# Patient Record
Sex: MALE | Race: WHITE | NOT HISPANIC OR LATINO | Employment: FULL TIME | URBAN - METROPOLITAN AREA
[De-identification: names, ages, dates, MRNs, and addresses within clinical notes are randomized per-mention and may not be internally consistent; named-entity substitution may affect disease eponyms.]

---

## 2017-03-17 ENCOUNTER — LAB VISIT (OUTPATIENT)
Dept: LAB | Facility: OTHER | Age: 47
End: 2017-03-17
Attending: FAMILY MEDICINE
Payer: COMMERCIAL

## 2017-03-17 ENCOUNTER — OFFICE VISIT (OUTPATIENT)
Dept: INTERNAL MEDICINE | Facility: CLINIC | Age: 47
End: 2017-03-17
Attending: FAMILY MEDICINE
Payer: COMMERCIAL

## 2017-03-17 VITALS
HEART RATE: 91 BPM | HEIGHT: 70 IN | BODY MASS INDEX: 27.75 KG/M2 | WEIGHT: 193.81 LBS | DIASTOLIC BLOOD PRESSURE: 80 MMHG | SYSTOLIC BLOOD PRESSURE: 110 MMHG

## 2017-03-17 DIAGNOSIS — Z00.00 PREVENTATIVE HEALTH CARE: Primary | ICD-10-CM

## 2017-03-17 DIAGNOSIS — E78.01 HYPERLIPIDEMIA TYPE II: ICD-10-CM

## 2017-03-17 DIAGNOSIS — E11.9 TYPE 2 DIABETES MELLITUS WITHOUT COMPLICATION, WITHOUT LONG-TERM CURRENT USE OF INSULIN: ICD-10-CM

## 2017-03-17 DIAGNOSIS — Z00.00 PREVENTATIVE HEALTH CARE: ICD-10-CM

## 2017-03-17 LAB
ALBUMIN SERPL BCP-MCNC: 4.3 G/DL
ALP SERPL-CCNC: 70 U/L
ALT SERPL W/O P-5'-P-CCNC: 28 U/L
ANION GAP SERPL CALC-SCNC: 8 MMOL/L
AST SERPL-CCNC: 20 U/L
BASOPHILS # BLD AUTO: 0.03 K/UL
BASOPHILS NFR BLD: 0.5 %
BILIRUB SERPL-MCNC: 0.2 MG/DL
BUN SERPL-MCNC: 12 MG/DL
CALCIUM SERPL-MCNC: 9.7 MG/DL
CHLORIDE SERPL-SCNC: 107 MMOL/L
CHOLEST/HDLC SERPL: 2.9 {RATIO}
CO2 SERPL-SCNC: 27 MMOL/L
CREAT SERPL-MCNC: 0.9 MG/DL
DIFFERENTIAL METHOD: ABNORMAL
EOSINOPHIL # BLD AUTO: 0.2 K/UL
EOSINOPHIL NFR BLD: 3.1 %
ERYTHROCYTE [DISTWIDTH] IN BLOOD BY AUTOMATED COUNT: 14.1 %
EST. GFR  (AFRICAN AMERICAN): >60 ML/MIN/1.73 M^2
EST. GFR  (NON AFRICAN AMERICAN): >60 ML/MIN/1.73 M^2
GLUCOSE SERPL-MCNC: 95 MG/DL
HCT VFR BLD AUTO: 47.4 %
HDL/CHOLESTEROL RATIO: 34 %
HDLC SERPL-MCNC: 159 MG/DL
HDLC SERPL-MCNC: 54 MG/DL
HGB BLD-MCNC: 15.7 G/DL
LDLC SERPL CALC-MCNC: 91 MG/DL
LYMPHOCYTES # BLD AUTO: 1.9 K/UL
LYMPHOCYTES NFR BLD: 31.8 %
MCH RBC QN AUTO: 31.8 PG
MCHC RBC AUTO-ENTMCNC: 33.1 %
MCV RBC AUTO: 96 FL
MONOCYTES # BLD AUTO: 0.5 K/UL
MONOCYTES NFR BLD: 8.3 %
NEUTROPHILS # BLD AUTO: 3.3 K/UL
NEUTROPHILS NFR BLD: 56 %
NONHDLC SERPL-MCNC: 105 MG/DL
PLATELET # BLD AUTO: 323 K/UL
PMV BLD AUTO: 9.3 FL
POTASSIUM SERPL-SCNC: 4.8 MMOL/L
PROT SERPL-MCNC: 7.2 G/DL
RBC # BLD AUTO: 4.93 M/UL
SODIUM SERPL-SCNC: 142 MMOL/L
TRIGL SERPL-MCNC: 70 MG/DL
TSH SERPL DL<=0.005 MIU/L-ACNC: 0.69 UIU/ML
WBC # BLD AUTO: 5.81 K/UL

## 2017-03-17 PROCEDURE — 85025 COMPLETE CBC W/AUTO DIFF WBC: CPT

## 2017-03-17 PROCEDURE — 84443 ASSAY THYROID STIM HORMONE: CPT

## 2017-03-17 PROCEDURE — 36415 COLL VENOUS BLD VENIPUNCTURE: CPT

## 2017-03-17 PROCEDURE — 99999 PR PBB SHADOW E&M-EST. PATIENT-LVL III: CPT | Mod: PBBFAC,,, | Performed by: FAMILY MEDICINE

## 2017-03-17 PROCEDURE — 80061 LIPID PANEL: CPT

## 2017-03-17 PROCEDURE — 83036 HEMOGLOBIN GLYCOSYLATED A1C: CPT

## 2017-03-17 PROCEDURE — 99396 PREV VISIT EST AGE 40-64: CPT | Mod: S$GLB,,, | Performed by: FAMILY MEDICINE

## 2017-03-17 PROCEDURE — 80053 COMPREHEN METABOLIC PANEL: CPT

## 2017-03-17 RX ORDER — ROSUVASTATIN CALCIUM 5 MG/1
TABLET, COATED ORAL
COMMUNITY
Start: 2016-12-31 | End: 2017-03-17 | Stop reason: SDUPTHER

## 2017-03-17 RX ORDER — BLOOD SUGAR DIAGNOSTIC
STRIP MISCELLANEOUS
COMMUNITY
Start: 2017-02-07 | End: 2017-03-17 | Stop reason: SDUPTHER

## 2017-03-17 RX ORDER — BLOOD SUGAR DIAGNOSTIC
STRIP MISCELLANEOUS
Qty: 300 EACH | Refills: 11 | Status: SHIPPED | OUTPATIENT
Start: 2017-03-17 | End: 2018-02-26 | Stop reason: SDUPTHER

## 2017-03-17 RX ORDER — ROSUVASTATIN CALCIUM 5 MG/1
5 TABLET, COATED ORAL DAILY
Qty: 90 TABLET | Refills: 3 | Status: SHIPPED | OUTPATIENT
Start: 2017-03-17 | End: 2017-10-12 | Stop reason: SDUPTHER

## 2017-03-17 RX ORDER — METFORMIN HYDROCHLORIDE 500 MG/1
500 TABLET ORAL 2 TIMES DAILY WITH MEALS
Qty: 90 TABLET | Refills: 3 | Status: SHIPPED | OUTPATIENT
Start: 2017-03-17 | End: 2017-04-18 | Stop reason: SDUPTHER

## 2017-03-17 RX ORDER — METFORMIN HYDROCHLORIDE 500 MG/1
TABLET ORAL
COMMUNITY
Start: 2017-01-25 | End: 2017-03-17 | Stop reason: SDUPTHER

## 2017-03-18 LAB
ESTIMATED AVG GLUCOSE: 126 MG/DL
HBA1C MFR BLD HPLC: 6 %

## 2017-03-20 ENCOUNTER — TELEPHONE (OUTPATIENT)
Dept: INTERNAL MEDICINE | Facility: CLINIC | Age: 47
End: 2017-03-20

## 2017-03-20 PROBLEM — E78.01 HYPERLIPIDEMIA TYPE II: Status: ACTIVE | Noted: 2017-03-20

## 2017-03-20 PROBLEM — E11.9 TYPE 2 DIABETES MELLITUS WITHOUT COMPLICATION, WITHOUT LONG-TERM CURRENT USE OF INSULIN: Status: ACTIVE | Noted: 2017-03-20

## 2017-03-20 NOTE — TELEPHONE ENCOUNTER
----- Message from Kam Phipps MD sent at 3/20/2017  4:08 PM CDT -----  Cholesterol is very good.  A1c is 6.  No changes in medications.  Followup as scheduled.

## 2017-03-20 NOTE — PROGRESS NOTES
"CHIEF COMPLAINT: Annual exam     HISTORY OF PRESENT ILLNESS: The patient is a 46 year-old white male. He has been diagnosed with diabetes and hyperglycemia since the last time I saw him.  He is currently on metformin and Crestor.     The patient has a history of diabetes.  Recent blood sugars have been less than 120.  There have been no episodes of hypoglycemia.    The patient has a history of stable hyperlipidemia on current medications.  The patient denies chest pain or shortness of breath today.  The patient denies muscle aches or myalgias suggestive of myositis.    REVIEW OF SYSTEMS:   GENERAL: No fever, chills, weight loss.   SKIN: No rashes, itching or changes in color or texture of skin.   HEAD: No headaches or recent head trauma.   EYES: Visual acuity fine. No photophobia, ocular pain or diplopia.   EARS: Denies ear pain, discharge or vertigo.   NOSE: No loss of smell, no epistaxis or postnasal drip.   MOUTH & THROAT: No hoarseness or change in voice. No excessive gum bleeding.   NODES: Denies swollen glands.   CHEST: Denies JASMINE, cyanosis, wheezing, cough and sputum production.   CARDIOVASCULAR: Denies chest pain, PND, orthopnea or reduced exercise tolerance.   ABDOMEN: Appetite fine. No weight loss. Denies diarrhea, abdominal pain, hematemesis or blood in stool.   URINARY: No flank pain, dysuria or hematuria.   PERIPHERAL VASCULAR: No claudication or cyanosis.   MUSCULOSKELETAL: No joint stiffness or swelling. Denies back pain.   NEUROLOGIC: No history of seizures, paralysis, alteration of gait or coordination.     SOCIAL HISTORY: The patient does not smoke. The patient consumes alcohol socially. The patient is single. He is an     PHYSICAL EXAMINATION:   Blood pressure 110/80, pulse 91, height 5' 10" (1.778 m), weight 87.9 kg (193 lb 12.6 oz).    APPEARANCE: Well nourished, well developed, in no acute distress.   HEAD: Normocephalic, atraumatic.   EYES: PERRL. EOMI. Conjunctivae without " injection and anicteric   EARS: TM's intact. Light reflex normal. No retraction or perforation.   NOSE: Mucosa pink. Airway clear.   MOUTH & THROAT: No tonsillar enlargement. No pharyngeal erythema or exudate. No stridor.   NECK: Supple.   NODES: No cervical, axillary or inguinal lymph node enlargement.   CHEST: Lungs clear to auscultation. No retractions are noted. No rales or rhonchi are present.   CARDIOVASCULAR: Normal S1, S2. No rubs, murmurs or gallops.   ABDOMEN: Bowel sounds normal. Not distended. Soft. No tenderness or masses. No ascites is noted.   MUSCULOSKELETAL: There is no clubbing, cyanosis, or edema of the extremities x4. There is full range of motion of the lumbar spine. There is full range of motion of the extremities x4. There is no deformity noted.   NEUROLOGIC:   Normal speech development.   Hearing normal.   Normal gait.   Motor and sensory exams grossly normal.   DTR's normal.   PSYCHIATRIC: Patient is alert and oriented x3. Thought processes are all normal. There is no homicidality. There is no suicidality. There is no evidence of psychosis.     LABORATORY/RADIOLOGY: Chart reviewed.      ASSESSMENT:   Normal annual exam   Type 2 diabetes   Hyperlipidemia   Family history DM  Family history of heart disease    PLAN:   he is doing well.  His A1c is 6 today.  Cholesterol panel looks good.  Liver functions are normal.  Return to clinic in 6 months with blood work prior

## 2017-04-18 DIAGNOSIS — E11.9 TYPE 2 DIABETES MELLITUS WITHOUT COMPLICATION, WITHOUT LONG-TERM CURRENT USE OF INSULIN: ICD-10-CM

## 2017-04-18 DIAGNOSIS — Z00.00 PREVENTATIVE HEALTH CARE: ICD-10-CM

## 2017-04-18 DIAGNOSIS — E78.01 HYPERLIPIDEMIA TYPE II: ICD-10-CM

## 2017-04-18 RX ORDER — METFORMIN HYDROCHLORIDE 500 MG/1
500 TABLET ORAL 2 TIMES DAILY WITH MEALS
Qty: 180 TABLET | Refills: 1 | Status: SHIPPED | OUTPATIENT
Start: 2017-04-18 | End: 2017-10-12 | Stop reason: SDUPTHER

## 2017-04-18 NOTE — TELEPHONE ENCOUNTER
----- Message from Geeta Jones sent at 4/18/2017  9:09 AM CDT -----  Contact: pt   X_  1st Request  _  2nd Request  _  3rd Request    Who:AAMIR SNIDER [[id number]]    Why: Patient states he would like to get a refill 90 day supply on his RX (metformin (GLUCOPHAGE) 500 MG tablet) called into [facility] [address] [place] [address] AT [facility] [address]    What Number to Call Back: [phone]    When to Expect a call back: (Before the end of the day)   -- if call after 3:00 call back will be tomorrow.

## 2017-05-22 ENCOUNTER — OFFICE VISIT (OUTPATIENT)
Dept: INTERNAL MEDICINE | Facility: CLINIC | Age: 47
End: 2017-05-22
Attending: FAMILY MEDICINE
Payer: COMMERCIAL

## 2017-05-22 ENCOUNTER — LAB VISIT (OUTPATIENT)
Dept: LAB | Facility: OTHER | Age: 47
End: 2017-05-22
Attending: FAMILY MEDICINE
Payer: COMMERCIAL

## 2017-05-22 VITALS
HEIGHT: 70 IN | HEART RATE: 93 BPM | WEIGHT: 198.44 LBS | DIASTOLIC BLOOD PRESSURE: 78 MMHG | SYSTOLIC BLOOD PRESSURE: 120 MMHG | BODY MASS INDEX: 28.41 KG/M2

## 2017-05-22 DIAGNOSIS — E11.9 TYPE 2 DIABETES MELLITUS WITHOUT COMPLICATION, WITHOUT LONG-TERM CURRENT USE OF INSULIN: Primary | ICD-10-CM

## 2017-05-22 DIAGNOSIS — N52.01 ERECTILE DYSFUNCTION DUE TO ARTERIAL INSUFFICIENCY: ICD-10-CM

## 2017-05-22 DIAGNOSIS — E11.9 TYPE 2 DIABETES MELLITUS WITHOUT COMPLICATION, WITHOUT LONG-TERM CURRENT USE OF INSULIN: ICD-10-CM

## 2017-05-22 DIAGNOSIS — E78.01 HYPERLIPIDEMIA TYPE II: ICD-10-CM

## 2017-05-22 PROCEDURE — 99999 PR PBB SHADOW E&M-EST. PATIENT-LVL III: CPT | Mod: PBBFAC,,, | Performed by: FAMILY MEDICINE

## 2017-05-22 PROCEDURE — 99214 OFFICE O/P EST MOD 30 MIN: CPT | Mod: S$GLB,,, | Performed by: FAMILY MEDICINE

## 2017-05-22 PROCEDURE — 3044F HG A1C LEVEL LT 7.0%: CPT | Mod: S$GLB,,, | Performed by: FAMILY MEDICINE

## 2017-05-22 PROCEDURE — 83036 HEMOGLOBIN GLYCOSYLATED A1C: CPT

## 2017-05-22 PROCEDURE — 3060F POS MICROALBUMINURIA REV: CPT | Mod: 8P,S$GLB,, | Performed by: FAMILY MEDICINE

## 2017-05-22 PROCEDURE — 36415 COLL VENOUS BLD VENIPUNCTURE: CPT

## 2017-05-22 PROCEDURE — 1160F RVW MEDS BY RX/DR IN RCRD: CPT | Mod: S$GLB,,, | Performed by: FAMILY MEDICINE

## 2017-05-22 RX ORDER — TADALAFIL 20 MG/1
TABLET ORAL
Qty: 5 TABLET | Refills: 11 | Status: SHIPPED | OUTPATIENT
Start: 2017-05-22 | End: 2017-09-27 | Stop reason: SDUPTHER

## 2017-05-22 NOTE — PROGRESS NOTES
"CHIEF COMPLAINT: Annual exam     HISTORY OF PRESENT ILLNESS: The patient is a 46 year-old white male.   He is currently on metformin and Crestor.     The patient has a history of diabetes.  Recent blood sugars have been less than 120.  There have been several episodes of hypoglycemia.  They seem to occur mostly during the day.  He is going to adjust his diet.    The patient has a history of stable hyperlipidemia on current medications.  The patient denies chest pain or shortness of breath today.  The patient denies muscle aches or myalgias suggestive of myositis.    REVIEW OF SYSTEMS:   GENERAL: No fever, chills, weight loss.   SKIN: No rashes, itching or changes in color or texture of skin.   HEAD: No headaches or recent head trauma.   EYES: Visual acuity fine. No photophobia, ocular pain or diplopia.   EARS: Denies ear pain, discharge or vertigo.   NOSE: No loss of smell, no epistaxis or postnasal drip.   MOUTH & THROAT: No hoarseness or change in voice. No excessive gum bleeding.   NODES: Denies swollen glands.   CHEST: Denies JASMINE, cyanosis, wheezing, cough and sputum production.   CARDIOVASCULAR: Denies chest pain, PND, orthopnea or reduced exercise tolerance.   ABDOMEN: Appetite fine. No weight loss. Denies diarrhea, abdominal pain, hematemesis or blood in stool.   URINARY: No flank pain, dysuria or hematuria.   PERIPHERAL VASCULAR: No claudication or cyanosis.   MUSCULOSKELETAL: No joint stiffness or swelling. Denies back pain.   NEUROLOGIC: No history of seizures, paralysis, alteration of gait or coordination.     SOCIAL HISTORY: The patient does not smoke. The patient consumes alcohol socially. The patient is single. He is an     PHYSICAL EXAMINATION:   Blood pressure 120/78, pulse 93, height 5' 10" (1.778 m), weight 90 kg (198 lb 6.6 oz).    APPEARANCE: Well nourished, well developed, in no acute distress.   HEAD: Normocephalic, atraumatic.   EYES: PERRL. EOMI. Conjunctivae without injection " and anicteric   EARS: TM's intact. Light reflex normal. No retraction or perforation.   NOSE: Mucosa pink. Airway clear.   MOUTH & THROAT: No tonsillar enlargement. No pharyngeal erythema or exudate. No stridor.   NECK: Supple.   NODES: No cervical, axillary or inguinal lymph node enlargement.   CHEST: Lungs clear to auscultation. No retractions are noted. No rales or rhonchi are present.   CARDIOVASCULAR: Normal S1, S2. No rubs, murmurs or gallops.   ABDOMEN: Bowel sounds normal. Not distended. Soft. No tenderness or masses. No ascites is noted.   MUSCULOSKELETAL: There is no clubbing, cyanosis, or edema of the extremities x4. There is full range of motion of the lumbar spine. There is full range of motion of the extremities x4. There is no deformity noted.   NEUROLOGIC:   Normal speech development.   Hearing normal.   Normal gait.   Motor and sensory exams grossly normal.   DTR's normal.   PSYCHIATRIC: Patient is alert and oriented x3. Thought processes are all normal. There is no homicidality. There is no suicidality. There is no evidence of psychosis.     LABORATORY/RADIOLOGY: Chart reviewed.      ASSESSMENT:   Normal annual exam   Type 2 diabetes, condition is well controlled on current medication regimen  Hyperlipidemia , condition is well controlled on current medication regimen  Family history of heart disease    PLAN:   he is doing well.  We will update his A1c today.  Cholesterol panel looks good.  Liver functions are normal.  If his A1c is 5.5 or lower we will change the metformin to a.m. dose only and check an A1c in 3 months.  If A1c is greater than 5.5 we will have him back in 6 months with blood work prior

## 2017-05-23 ENCOUNTER — TELEPHONE (OUTPATIENT)
Dept: INTERNAL MEDICINE | Facility: CLINIC | Age: 47
End: 2017-05-23

## 2017-05-23 DIAGNOSIS — E11.9 TYPE 2 DIABETES MELLITUS WITHOUT COMPLICATION: ICD-10-CM

## 2017-05-23 LAB
ESTIMATED AVG GLUCOSE: 128 MG/DL
HBA1C MFR BLD HPLC: 6.1 %

## 2017-05-23 NOTE — TELEPHONE ENCOUNTER
----- Message from Kam Phipps MD sent at 5/23/2017  1:05 PM CDT -----  A1c is about the same at 6.1.  Continue current dose of metformin.  No changes in medications.  Followup as scheduled in 6 months with lab work prior.

## 2017-05-24 ENCOUNTER — TELEPHONE (OUTPATIENT)
Dept: INTERNAL MEDICINE | Facility: CLINIC | Age: 47
End: 2017-05-24

## 2017-05-24 NOTE — TELEPHONE ENCOUNTER
----- Message from Eddie Holder sent at 5/24/2017  9:50 AM CDT -----  Contact: pt  x_ 1st Request   _ 2nd Request   _ 3rd Request     Who: AAMIR SNIDER [[id number]]    Why: pt is requesting a call back in reference to paperwork the pharmacy sent to clinic for Rx tadalafil (CIALIS) 20 MG Tab    What Number to Call Back: [phone]     When to Expect a call back: (Before the end of the day)   -- if call after 3:00 call back will be tomorrow.

## 2017-08-11 ENCOUNTER — PATIENT MESSAGE (OUTPATIENT)
Dept: INTERNAL MEDICINE | Facility: CLINIC | Age: 47
End: 2017-08-11

## 2017-09-12 ENCOUNTER — OFFICE VISIT (OUTPATIENT)
Dept: PODIATRY | Facility: CLINIC | Age: 47
End: 2017-09-12
Payer: COMMERCIAL

## 2017-09-12 VITALS
HEIGHT: 70 IN | SYSTOLIC BLOOD PRESSURE: 123 MMHG | HEART RATE: 85 BPM | BODY MASS INDEX: 29.2 KG/M2 | WEIGHT: 204 LBS | DIASTOLIC BLOOD PRESSURE: 83 MMHG

## 2017-09-12 DIAGNOSIS — L84 CORN OR CALLUS: ICD-10-CM

## 2017-09-12 DIAGNOSIS — E11.9 TYPE 2 DIABETES MELLITUS WITHOUT COMPLICATION, WITHOUT LONG-TERM CURRENT USE OF INSULIN: Primary | ICD-10-CM

## 2017-09-12 PROCEDURE — 99203 OFFICE O/P NEW LOW 30 MIN: CPT | Mod: S$GLB,,, | Performed by: PODIATRIST

## 2017-09-12 PROCEDURE — 99999 PR PBB SHADOW E&M-EST. PATIENT-LVL III: CPT | Mod: PBBFAC,,, | Performed by: PODIATRIST

## 2017-09-12 PROCEDURE — 3044F HG A1C LEVEL LT 7.0%: CPT | Mod: S$GLB,,, | Performed by: PODIATRIST

## 2017-09-12 PROCEDURE — 3008F BODY MASS INDEX DOCD: CPT | Mod: S$GLB,,, | Performed by: PODIATRIST

## 2017-09-12 NOTE — PROGRESS NOTES
Subjective:      Patient ID: Osmel Nair is a 47 y.o. male.    Chief Complaint: Diabetes Mellitus (bilateral pcp 5/22/17 Dr PHIPPS) and Diabetic Foot Exam    Osmel is a 47 y.o. male who presents to the clinic upon referral from Dr. Phipps  for evaluation and treatment of diabetic feet. Osmel has no past medical history on file. Patient relates no major problem with feet. Only complaints today consist of yearly DM foot examination  .    PCP: Kam Phipps MD    Date Last Seen by PCP:   Chief Complaint   Patient presents with    Diabetes Mellitus     bilateral pcp 5/22/17 Dr PHIPPS    Diabetic Foot Exam         Current shoe gear: Casual shoes    Hemoglobin A1C   Date Value Ref Range Status   05/22/2017 6.1 4.5 - 6.2 % Final     Comment:     According to ADA guidelines, hemoglobin A1C <7.0% represents  optimal control in non-pregnant diabetic patients.  Different  metrics may apply to specific populations.   Standards of Medical Care in Diabetes - 2016.  For the purpose of screening for the presence of diabetes:  <5.7%     Consistent with the absence of diabetes  5.7-6.4%  Consistent with increasing risk for diabetes   (prediabetes)  >or=6.5%  Consistent with diabetes  Currently no consensus exists for use of hemoglobin A1C  for diagnosis of diabetes for children.     03/17/2017 6.0 4.5 - 6.2 % Final     Comment:     According to ADA guidelines, hemoglobin A1C <7.0% represents  optimal control in non-pregnant diabetic patients.  Different  metrics may apply to specific populations.   Standards of Medical Care in Diabetes - 2016.  For the purpose of screening for the presence of diabetes:  <5.7%     Consistent with the absence of diabetes  5.7-6.4%  Consistent with increasing risk for diabetes   (prediabetes)  >or=6.5%  Consistent with diabetes  Currently no consensus exists for use of hemoglobin A1C  for diagnosis of diabetes for children.     06/11/2014 6.0 4.5 - 6.2 % Final  "                Patient Active Problem List   Diagnosis    Cervical radiculopathy    Myalgia and myositis    Type 2 diabetes mellitus without complication, without long-term current use of insulin    Hyperlipidemia type II       Current Outpatient Prescriptions on File Prior to Visit   Medication Sig Dispense Refill    CONTOUR NEXT STRIPS Strp Test tid 300 each 11    rosuvastatin (CRESTOR) 5 MG tablet Take 1 tablet (5 mg total) by mouth once daily. 90 tablet 3    tadalafil (CIALIS) 20 MG Tab Use one tablet every 36 hours 5 tablet 11    metformin (GLUCOPHAGE) 500 MG tablet Take 1 tablet (500 mg total) by mouth 2 (two) times daily with meals. 180 tablet 1     No current facility-administered medications on file prior to visit.        Review of patient's allergies indicates:  No Known Allergies    No past surgical history on file.    Family History   Problem Relation Age of Onset    Diabetes Mother     Diabetes Father     Diabetes Sister        Social History     Social History    Marital status: Single     Spouse name: N/A    Number of children: N/A    Years of education: N/A     Occupational History    Not on file.     Social History Main Topics    Smoking status: Current Every Day Smoker    Smokeless tobacco: Never Used    Alcohol use Yes    Drug use: Unknown    Sexual activity: Not on file     Other Topics Concern    Not on file     Social History Narrative    No narrative on file           Review of Systems   Constitution: Negative for chills, decreased appetite and fever.   Cardiovascular: Negative for leg swelling.   Musculoskeletal: Negative for arthritis, joint pain, joint swelling and myalgias.   Gastrointestinal: Negative for nausea and vomiting.   Neurological: Negative for loss of balance, numbness and paresthesias.           Objective:       Vitals:    09/12/17 0857   BP: 123/83   Pulse: 85   Weight: 92.5 kg (204 lb)   Height: 5' 10" (1.778 m)   PainSc: 0-No pain        Physical Exam "   Constitutional: He is oriented to person, place, and time. He appears well-developed and well-nourished.   Cardiovascular: Intact distal pulses.    Pulses:       Dorsalis pedis pulses are 2+ on the right side, and 2+ on the left side.        Posterior tibial pulses are 2+ on the right side, and 2+ on the left side.   dorsalis pedis and posterior tibial pulses are palpable bilaterally. Capillary refill time is within normal limits. + pedal hair growth          Musculoskeletal: Normal range of motion. He exhibits no edema or tenderness.   Adequate joint range of motion without pain, limitation, nor crepitation Bilateral feet and ankle joints. Muscle strength is 5/5 in all groups bilaterally.         Feet:   Right Foot:   Protective Sensation: 5 sites tested. 5 sites sensed.   Left Foot:   Protective Sensation: 5 sites tested. 5 sites sensed.   Neurological: He is alert and oriented to person, place, and time. He has normal strength. No sensory deficit.   Crawford-Haley 5.07 monofilament is intact bilateral feet.      Skin: Skin is warm, dry and intact. No lesion and no rash noted. No erythema.   Nails 1-5 bilaterally  are normal in length, thickness, coloration and are non dystrophic.     Hyperkeratotic tissue noted to medial HIPJ b/l      Psychiatric: He has a normal mood and affect. His behavior is normal.   Vitals reviewed.            Assessment:       Encounter Diagnoses   Name Primary?    Type 2 diabetes mellitus without complication, without long-term current use of insulin Yes    Corn or callus          Plan:       Osmel was seen today for diabetes mellitus and diabetic foot exam.    Diagnoses and all orders for this visit:    Type 2 diabetes mellitus without complication, without long-term current use of insulin    Corn or callus      I counseled the patient on his conditions, their implications and medical management.    - Shoe inspection. Diabetic Foot Education. Patient reminded of the importance of  good nutrition and blood sugar control to help prevent podiatric complications of diabetes. Patient instructed on proper foot hygeine. We discussed wearing proper shoe gear, daily foot inspections, never walking without protective shoe gear, caution putting sharp instruments to feet     - Discussed DM foot care:  Wear comfortable, proper fitting shoes. Wash feet daily. Dry well. After drying, apply moisturizer to feet (no lotion to webspaces). Inspect feet daily for skin breaks, blisters, swelling, or redness. Wear cotton socks (preferably white)  Change socks every day. Do NOT walk barefoot. Do NOT use heating pads or warm/hot water soaks     - Discussed importance of daily moisturizer to the feet such as Gold bonds diabetic foot cream    - Patient is low risk for developing lower extremity issues secondary to diabetes    - Use pumice stone to region after bathing 2-3x/week to decrease callus build up     - RTC in  1 year for a diabetic foot exam  or sooner if problems

## 2017-09-12 NOTE — LETTER
September 12, 2017      Kam Phipps MD  [address]  [address]  [facility] [address]           [facility]  [address]  [facility] [address]  Phone: [phone]          Patient: Osmel Nair   MR Number: [id number]   YOB: 1970   Date of Visit: 9/12/2017       Dear Dr. Kam Phipps:    Thank you for referring Osmel Nair to me for evaluation. Attached you will find relevant portions of my assessment and plan of care.    If you have questions, please do not hesitate to call me. I look forward to following Osmel Nair along with you.    Sincerely,    Lucy Alcantara, LORENZA    Enclosure  CC:  No Recipients    If you would like to receive this communication electronically, please contact [email or web address] or [phone] to request more information on [employer] access.    For providers and/or their staff who would like to refer a patient to [facility], please contact us through our one-stop-shop provider referral line, [facility], at [phone].    If you feel you have received this communication in error or would no longer like to receive these types of communications, please e-mail [email or web address]

## 2017-09-27 ENCOUNTER — OFFICE VISIT (OUTPATIENT)
Dept: INTERNAL MEDICINE | Facility: CLINIC | Age: 47
End: 2017-09-27
Attending: FAMILY MEDICINE
Payer: COMMERCIAL

## 2017-09-27 VITALS — HEIGHT: 70 IN | WEIGHT: 202.81 LBS | HEART RATE: 100 BPM | BODY MASS INDEX: 29.03 KG/M2

## 2017-09-27 DIAGNOSIS — E11.9 DIABETES MELLITUS WITHOUT COMPLICATION: Primary | ICD-10-CM

## 2017-09-27 DIAGNOSIS — E11.9 TYPE 2 DIABETES MELLITUS WITHOUT COMPLICATION, WITHOUT LONG-TERM CURRENT USE OF INSULIN: ICD-10-CM

## 2017-09-27 DIAGNOSIS — N52.01 ERECTILE DYSFUNCTION DUE TO ARTERIAL INSUFFICIENCY: ICD-10-CM

## 2017-09-27 DIAGNOSIS — H93.13 TINNITUS AURIUM, BILATERAL: ICD-10-CM

## 2017-09-27 DIAGNOSIS — M54.12 CERVICAL RADICULOPATHY: ICD-10-CM

## 2017-09-27 PROCEDURE — 99214 OFFICE O/P EST MOD 30 MIN: CPT | Mod: S$GLB,,, | Performed by: FAMILY MEDICINE

## 2017-09-27 PROCEDURE — 99999 PR PBB SHADOW E&M-EST. PATIENT-LVL IV: CPT | Mod: PBBFAC,,, | Performed by: FAMILY MEDICINE

## 2017-09-27 PROCEDURE — 3008F BODY MASS INDEX DOCD: CPT | Mod: S$GLB,,, | Performed by: FAMILY MEDICINE

## 2017-09-27 RX ORDER — SILDENAFIL 25 MG/1
25 TABLET, FILM COATED ORAL
Qty: 30 TABLET | Refills: 3 | Status: SHIPPED | OUTPATIENT
Start: 2017-09-27 | End: 2017-11-07 | Stop reason: SDUPTHER

## 2017-09-27 RX ORDER — SILDENAFIL 25 MG/1
25 TABLET, FILM COATED ORAL DAILY PRN
Qty: 30 TABLET | Refills: 3 | Status: SHIPPED | OUTPATIENT
Start: 2017-09-27 | End: 2017-10-06 | Stop reason: SDUPTHER

## 2017-09-27 NOTE — PROGRESS NOTES
CHIEF COMPLAINT: One month of bilateral tinnitus and 2 months of left arm pain     HISTORY OF PRESENT ILLNESS: The patient is a 47 year-old white male.   He is currently on Crestor.     For the past month he's had daily episodes of tinnitus.  They are bilateral.  They are quite frustrating.  He has been traveling [place] about [place] frequently for work.    For the past 2 months he has had classic left sided radicular pain.  It is approximately the C6 distribution.  There are no myelopathic findings.  Over-the-counter medicines are not helping.      The patient has a history of diabetes.  Recent blood sugars have been less than 120.  There have been no episodes of hypoglycemia.      The patient has a history of stable hyperlipidemia on current medications.  The patient denies chest pain or shortness of breath today.  The patient denies muscle aches or myalgias suggestive of myositis.    REVIEW OF SYSTEMS:   GENERAL: No fever, chills, weight loss.   SKIN: No rashes, itching or changes in color or texture of skin.   HEAD: No headaches or recent head trauma.   EYES: Visual acuity fine. No photophobia, ocular pain or diplopia.   EARS: Denies ear pain, discharge or vertigo.   NOSE: No loss of smell, no epistaxis or postnasal drip.   MOUTH & THROAT: No hoarseness or change in voice. No excessive gum bleeding.   NODES: Denies swollen glands.   CHEST: Denies JASMINE, cyanosis, wheezing, cough and sputum production.   CARDIOVASCULAR: Denies chest pain, PND, orthopnea or reduced exercise tolerance.   ABDOMEN: Appetite fine. No weight loss. Denies diarrhea, abdominal pain, hematemesis or blood in stool.   URINARY: No flank pain, dysuria or hematuria.   PERIPHERAL VASCULAR: No claudication or cyanosis.   MUSCULOSKELETAL: No joint stiffness or swelling. Denies back pain.   NEUROLOGIC: No history of seizures, paralysis, alteration of gait or coordination.     SOCIAL HISTORY: The patient does not smoke. The patient  "consumes alcohol socially. The patient is single. He is an     PHYSICAL EXAMINATION:   Pulse 100, height 5' 10" (1.778 m), weight 92 kg (202 lb 13.2 oz).    APPEARANCE: Well nourished, well developed, in no acute distress.   HEAD: Normocephalic, atraumatic.   EYES: PERRL. EOMI. Conjunctivae without injection and anicteric   EARS: TM's intact. Light reflex normal. No retraction or perforation.   NOSE: Mucosa pink. Airway clear.   MOUTH & THROAT: No tonsillar enlargement. No pharyngeal erythema or exudate. No stridor.   NECK: Supple.   NODES: No cervical, axillary or inguinal lymph node enlargement.   CHEST: Lungs clear to auscultation. No retractions are noted. No rales or rhonchi are present.   CARDIOVASCULAR: Normal S1, S2. No rubs, murmurs or gallops.   ABDOMEN: Bowel sounds normal. Not distended. Soft. No tenderness or masses. No ascites is noted.   MUSCULOSKELETAL: There is no clubbing, cyanosis, or edema of the extremities x4. There is full range of motion of the lumbar spine. There is full range of motion of the extremities x4. There is no deformity noted.   NEUROLOGIC:   Normal speech development.   Hearing normal.   Normal gait.   Motor and sensory exams grossly normal.   DTR's normal.   PSYCHIATRIC: Patient is alert and oriented x3. Thought processes are all normal. There is no homicidality. There is no suicidality. There is no evidence of psychosis.     LABORATORY/RADIOLOGY: Chart reviewed.      ASSESSMENT:   Bilateral tinnitus  Left cervical radiculopathy   Type 2 diabetes, condition is well controlled on current medication regimen  Hyperlipidemia , condition is well controlled on current medication regimen  Family history of heart disease    PLAN:   Given the duration of the tinnitus that think he would benefit from seeing ENT    Given the duration of the radiculopathy he would probably benefit from epidural steroids      We will update his A1c and urinary microalbumin in about 2 " months

## 2017-10-06 ENCOUNTER — OFFICE VISIT (OUTPATIENT)
Dept: PAIN MEDICINE | Facility: CLINIC | Age: 47
End: 2017-10-06
Attending: ANESTHESIOLOGY
Payer: COMMERCIAL

## 2017-10-06 VITALS — HEIGHT: 70 IN | BODY MASS INDEX: 29.03 KG/M2 | WEIGHT: 202.81 LBS

## 2017-10-06 DIAGNOSIS — M54.12 LEFT CERVICAL RADICULOPATHY: Primary | ICD-10-CM

## 2017-10-06 PROCEDURE — 99214 OFFICE O/P EST MOD 30 MIN: CPT | Mod: S$GLB,,, | Performed by: ANESTHESIOLOGY

## 2017-10-06 RX ORDER — FLUTICASONE PROPIONATE 50 MCG
SPRAY, SUSPENSION (ML) NASAL
COMMUNITY
Start: 2017-10-02 | End: 2018-08-03

## 2017-10-06 RX ORDER — GABAPENTIN 300 MG/1
300 CAPSULE ORAL 2 TIMES DAILY
Qty: 60 CAPSULE | Refills: 11 | Status: SHIPPED | OUTPATIENT
Start: 2017-10-06 | End: 2017-11-08

## 2017-10-06 NOTE — PROGRESS NOTES
Chronic Pain - New Consult    Referring Physician: Kam Phipps*      Chief Complaint   Patient presents with    Shoulder Pain     Left x 5 weeks, radiates down arm into hand        SUBJECTIVE:    Osmel Nair is a 46 y/o male with hx of T2DM who presents to the clinic for the evaluation of left shoulder pain. The pain started 5 weeks ago and symptoms have been persistent. No trauma or inciting event. The pain is located in the left upper trapezius area and radiates down the arm into the thumb and index finger. The pain initially involved the neck although this has resolved.  The arm and hand pain is described as numb and tingling. The shoulder pain is described as sharp and shooting and is rated as 2/10. The pain is rated with a score of  2/10 on the BEST day and a score of 8/10 on the WORST day.  Symptoms interfere with daily activity, sleeping and work. The pain is exacerbated by sleeping, lots of physical exertion, and manual labor.  The pain is mitigated by rest and medication. He reports spending 0 hours per day reclining. The patient reports 6-8 hours of uninterrupted sleep per night.    Patient denies urinary incontinence, bowel incontinence, significant weight loss, significant motor weakness and loss of sensations.    Physical Therapy/Home Exercise: no      Pain Disability Index Review:  Last 3 PDI Scores 10/6/2017 7/23/2015   Pain Disability Index (PDI) 25 6       Pain Medications:    - Aleve as needed     report:  Not applicable    Pain Procedures: None    Imaging:       Cervical X-ray (7/2015):    Results: AP, lateral, bilateral oblique, flexion and extension views.  The alignment is normal, vertebral body height and disk spaces are well-maintained.  No significant osteophyte formation.  Flexion and extension views demonstrate no translational   abnormalities. Right oblique view demonstrates mild foramina narrowing on the right at C3-C4 and C4-C5 and left oblique view demonstrate  a mild foramina narrowing at C3-C4, C4-C5 and C5-C6 failure No fracture or osseous lesion seen. Prevertebral soft   tissues appear normal.    Past Medical History:   Diagnosis Date    Diabetes mellitus, type 2 07/2016    Lyme disease 2016     Past Surgical History:   Procedure Laterality Date    KNEE ARTHROSCOPY Right 1989     Social History     Social History    Marital status: Single     Spouse name: N/A    Number of children: N/A    Years of education: N/A     Occupational History    Not on file.     Social History Main Topics    Smoking status: Current Every Day Smoker    Smokeless tobacco: Never Used    Alcohol use Yes    Drug use: Unknown    Sexual activity: Not on file     Other Topics Concern    Not on file     Social History Narrative    No narrative on file     Family History   Problem Relation Age of Onset    Diabetes Mother     Diabetes Father     Diabetes Sister        Review of patient's allergies indicates:  No Known Allergies    Current Outpatient Prescriptions   Medication Sig    CONTOUR NEXT STRIPS Strp Test tid    rosuvastatin (CRESTOR) 5 MG tablet Take 1 tablet (5 mg total) by mouth once daily.    sildenafil (VIAGRA) 25 MG tablet Take 1 tablet (25 mg total) by mouth as needed for Erectile Dysfunction.    fluticasone (FLONASE) 50 mcg/actuation nasal spray     gabapentin (NEURONTIN) 300 MG capsule Take 1 capsule (300 mg total) by mouth 2 (two) times daily.    metformin (GLUCOPHAGE) 500 MG tablet Take 1 tablet (500 mg total) by mouth 2 (two) times daily with meals.     No current facility-administered medications for this visit.        REVIEW OF SYSTEMS:    GENERAL:  No weight loss, malaise or fevers.  HEENT:  Negative for frequent or significant headaches.  NECK:  Negative for lumps and significant neck swelling.  RESPIRATORY:  Negative for cough, wheezing or shortness of breath.  CARDIOVASCULAR:  Negative for chest pain, leg swelling or palpitations.  GI:  Negative for  "abdominal discomfort, blood in stools or black stools or change in bowel habits.  MUSCULOSKELETAL:  See HPI.  SKIN:  Negative for lesions, rash, and itching.  PSYCH:  Negative for sleep disturbance, mood disorder and recent psychosocial stressors.  HEMATOLOGY/LYMPHOLOGY:  Negative for prolonged bleeding, bruising easily or swollen nodes.  NEURO:   No history of paralysis, seizures or tremors.  All other reviewed and negative other than HPI.    OBJECTIVE:    Ht 5' 10" (1.778 m)   Wt 92 kg (202 lb 13.2 oz)   BMI 29.10 kg/m²     PHYSICAL EXAMINATION:    General appearance: Well appearing, in no acute distress, alert and oriented x3.  Psych:  Mood and affect appropriate.  Skin: Skin color, texture, turgor normal, no rashes or lesions, in both upper and lower body.  Head/face:  Normocephalic, atraumatic. No palpable lymph nodes.  Neck: No pain to palpation over the cervical paraspinous muscles. No pain with neck flexion, extension, or lateral flexion.  Full ROM.  Cor: Radial pulses 2+.  Pulm: Breathing unlabored.  GI:  Soft and non-tender.  Back: Straight leg raising in the sitting and supine positions is negative to radicular pain. No pain to palpation over the spine or costovertebral angles. Normal range of motion without pain reproduction.  Extremities: Peripheral joint ROM is full and pain free without obvious instability or laxity in all four extremities. No deformities, edema, or skin discoloration.  Musculoskeletal: No atrophy or tone abnormalities are noted.  Neuro: Bilateral upper and lower extremity coordination and muscle stretch reflexes are physiologic and symmetric.  No loss of sensation is noted.      Strength   Deltoids Triceps Biceps Wrist Extension Wrist Flexion Hand    Upper: R 5/5 5/5 5/5 5/5 5/5 5/5     L 5/5 5/5 5/5 5/5 5/5 5/5     Gait: Normal.    ASSESSMENT: 47 y.o. male with left upper extremity pain, consistent with C6 radiculopathy.     1. Left cervical radiculopathy          PLAN:     - " I have stressed the importance of physical activity and a home exercise plan to help with pain and improve health.  - Discussed a trial of PT.  - Order MRI of the Cervical Spine to help evaluate possible source of pain.  - Start Neurontin 300mg gradually to two times a day to help with radicular pain.  - In the future, I will consider cervical MARIA LUZ.  - Will contact patient with cervical MRI results.    The above plan and management options were discussed at length with patient. Patient is in agreement with the above and verbalized understanding. It will be communicated with the referring physician via electronic record, fax, or mail.    Malachi Mata III  10/06/2017

## 2017-10-06 NOTE — LETTER
October 6, 2017      Kam Phipps MD  [address]  [address]  [facility] [address]           [facility] [place] [facility]  [address]  [facility] [address]  Phone: [phone]  Fax: [phone]          Patient: Osmel Nair   MR Number: [id number]   YOB: 1970   Date of Visit: 10/6/2017       Dear Dr. Kam Phipps:    Thank you for referring Osmel Nair to me for evaluation. Attached you will find relevant portions of my assessment and plan of care.    If you have questions, please do not hesitate to call me. I look forward to following Osmel Nair along with you.    Sincerely,    Malachi Mata III, MD    Enclosure  CC:  No Recipients    If you would like to receive this communication electronically, please contact [email or web address] or [phone] to request more information on [employer] access.    For providers and/or their staff who would like to refer a patient to [facility], please contact us through our one-stop-shop provider referral line, [facility], at [phone].    If you feel you have received this communication in error or would no longer like to receive these types of communications, please e-mail [email or web address]

## 2017-10-10 ENCOUNTER — HOSPITAL ENCOUNTER (OUTPATIENT)
Dept: RADIOLOGY | Facility: HOSPITAL | Age: 47
Discharge: HOME | End: 2017-10-10
Attending: ANESTHESIOLOGY
Payer: COMMERCIAL

## 2017-10-10 DIAGNOSIS — M54.12 LEFT CERVICAL RADICULOPATHY: ICD-10-CM

## 2017-10-10 PROCEDURE — 72141 MRI NECK SPINE W/O DYE: CPT | Mod: 26,,, | Performed by: RADIOLOGY

## 2017-10-10 PROCEDURE — 72141 MRI NECK SPINE W/O DYE: CPT | Mod: TC

## 2017-10-11 ENCOUNTER — TELEPHONE (OUTPATIENT)
Dept: PAIN MEDICINE | Facility: CLINIC | Age: 47
End: 2017-10-11

## 2017-10-11 NOTE — TELEPHONE ENCOUNTER
Spoke to pt to discuss Dr. Pyle procedure. Per pt request, procedure was scheduled 10/23. All instructions explained to pt and pt verbalized an understanding. Pt had also requested my number be sent to him via my chart. I attempted to do so, but his account was inactive. I called pt back and informed him of this, to which he stated he would go on line to reactivate.

## 2017-10-12 DIAGNOSIS — E78.01 HYPERLIPIDEMIA TYPE II: ICD-10-CM

## 2017-10-12 DIAGNOSIS — E11.9 TYPE 2 DIABETES MELLITUS WITHOUT COMPLICATION, WITHOUT LONG-TERM CURRENT USE OF INSULIN: Primary | ICD-10-CM

## 2017-10-12 DIAGNOSIS — Z00.00 PREVENTATIVE HEALTH CARE: ICD-10-CM

## 2017-10-12 RX ORDER — METFORMIN HYDROCHLORIDE 500 MG/1
500 TABLET ORAL 2 TIMES DAILY WITH MEALS
Qty: 180 TABLET | Refills: 1 | Status: SHIPPED | OUTPATIENT
Start: 2017-10-12 | End: 2018-02-02 | Stop reason: SDUPTHER

## 2017-10-13 ENCOUNTER — TELEPHONE (OUTPATIENT)
Dept: PAIN MEDICINE | Facility: CLINIC | Age: 47
End: 2017-10-13

## 2017-10-13 ENCOUNTER — TELEPHONE (OUTPATIENT)
Dept: ORTHOPEDICS | Facility: CLINIC | Age: 47
End: 2017-10-13

## 2017-10-13 RX ORDER — ROSUVASTATIN CALCIUM 5 MG/1
5 TABLET, COATED ORAL DAILY
Qty: 90 TABLET | Refills: 3 | Status: SHIPPED | OUTPATIENT
Start: 2017-10-13 | End: 2018-08-06 | Stop reason: SDUPTHER

## 2017-10-13 NOTE — TELEPHONE ENCOUNTER
Spoke to patient and confirmed that we are moving his appt from 10/23 to 10/30. Patient verbalized understanding

## 2017-10-30 ENCOUNTER — HOSPITAL ENCOUNTER (OUTPATIENT)
Facility: HOSPITAL | Age: 47
Discharge: HOME | End: 2017-10-30
Attending: ANESTHESIOLOGY | Admitting: ANESTHESIOLOGY
Payer: COMMERCIAL

## 2017-10-30 VITALS
HEART RATE: 71 BPM | TEMPERATURE: 99 F | OXYGEN SATURATION: 96 % | WEIGHT: 200 LBS | SYSTOLIC BLOOD PRESSURE: 125 MMHG | RESPIRATION RATE: 18 BRPM | BODY MASS INDEX: 28.63 KG/M2 | DIASTOLIC BLOOD PRESSURE: 77 MMHG | HEIGHT: 70 IN

## 2017-10-30 DIAGNOSIS — M54.12 CERVICAL RADICULAR PAIN: ICD-10-CM

## 2017-10-30 DIAGNOSIS — M54.12 CERVICAL RADICULOPATHY: ICD-10-CM

## 2017-10-30 DIAGNOSIS — M47.812 OSTEOARTHRITIS OF CERVICAL SPINE, UNSPECIFIED SPINAL OSTEOARTHRITIS COMPLICATION STATUS: ICD-10-CM

## 2017-10-30 DIAGNOSIS — M54.12 LEFT CERVICAL RADICULOPATHY: Primary | ICD-10-CM

## 2017-10-30 LAB — POCT GLUCOSE: 151 MG/DL (ref 70–110)

## 2017-10-30 PROCEDURE — 99152 MOD SED SAME PHYS/QHP 5/>YRS: CPT

## 2017-10-30 PROCEDURE — 82962 GLUCOSE BLOOD TEST: CPT

## 2017-10-30 PROCEDURE — 99152 MOD SED SAME PHYS/QHP 5/>YRS: CPT | Mod: ,,, | Performed by: ANESTHESIOLOGY

## 2017-10-30 PROCEDURE — 63600175 PHARM REV CODE 636 W HCPCS

## 2017-10-30 PROCEDURE — 25000003 PHARM REV CODE 250

## 2017-10-30 PROCEDURE — 62321 NJX INTERLAMINAR CRV/THRC: CPT | Mod: ,,, | Performed by: ANESTHESIOLOGY

## 2017-10-30 RX ORDER — SODIUM CHLORIDE 9 MG/ML
500 INJECTION, SOLUTION INTRAVENOUS CONTINUOUS
Status: DISCONTINUED | OUTPATIENT
Start: 2017-10-30 | End: 2017-10-30 | Stop reason: HOSPADM

## 2017-10-30 NOTE — PLAN OF CARE
Problem: Patient Care Overview  Goal: Plan of Care Review  Outcome: Ongoing (interventions implemented as appropriate)  Report received from QUIANA Gray. Patient s/p pain injection. bandaid intact, no bleeding noted. Vss. No complaints from patient. Call light in reach. Will monitor.

## 2017-10-30 NOTE — DISCHARGE INSTRUCTIONS

## 2017-10-30 NOTE — NURSING
Iv d/c'd with cath tip intact.  Verbalized understanding of d/c instructions.  trf off unit for d/c home via wheelchair.  Tolerated fluids and food.

## 2017-10-30 NOTE — OP NOTE
"Patient Name: Osmel Nair  MRN: [id number]    INFORMED CONSENT: The procedure, risks, benefits and options were discussed with patient. There are no contraindications to the procedure. The patient expressed understanding and agreed to proceed. The personnel performing the procedure was discussed. I verify that I personally obtained the patient's consent prior to the start of the procedure and the signed consent can be found on the patient's chart.    PROCEDURE: C7-T1 CERVICAL EPIDURAL STEROID INJECTION     Procedure Date: 10/30/2017  Surgeon(s) and Role:     * Malachi Mata III, MD - Primary  Anesthesia: RN IV Sedation  Procedure(s) (LRB):  INJECTION-STEROID-EPIDURAL-CERVICAL (Left)    Pre Procedure diagnosis: Left cervical radiculopathy [M54.12]  Post-Procedure diagnosis: Left cervical radiculopathy [M54.12]    Sedation: Yes - Fentanyl 50 mcg and Midazolam 1 mg IV    DESCRIPTION OF PROCEDURE: The patient was brought to the procedure room. IV access was obtained prior to the procedure. The patient was positioned prone on the fluoroscopy table. Continuous hemodynamic monitoring was initiated including blood pressure, EKG, and pulse oximetry. The area of the cervical spine was prepped with chlorhexidine three times and draped in a sterile fashion. Skin anesthesia was achieved using 3 mL of Lidocaine 1% over the respective injection site. The C7-T1 interspace was visualized under fluoroscopic imaging. A 20 gauge 3 1/2" Tuohy needle was slowly inserted and advanced using loss of resistance to air with AP, oblique and lateral fluoroscopic imaging for needle guidance. Negative aspiration for blood or CSF was confirmed. Epidural contrast spread was confirmed using 2mL of Omnipaque 300 contrast. Spread of the contrast in the cervical epidural space was noted. 5 mL of Lidocaine 0.5% and 80 mg Depo-Medrol was injected. The needle was flushed and removed. Bleeding was nil. A sterile dressing was applied. No " specimens collected. The patient was taken back to the recovery room for further observation.

## 2017-10-30 NOTE — INTERVAL H&P NOTE
The patient has been examined and the H&P has been reviewed:    I concur with the findings and no changes have occurred since H&P was written.    Anesthesia/Surgery risks, benefits and alternative options discussed and understood by patient/family.          Active Hospital Problems    Diagnosis  POA    Cervical radicular pain [M54.12]  Yes      Resolved Hospital Problems    Diagnosis Date Resolved POA   No resolved problems to display.

## 2017-10-30 NOTE — DISCHARGE SUMMARY
Discharge Note  Short Stay      SUMMARY     Admit Date: 10/30/2017    Attending Physician: Malachi Mata III      Discharge Physician: Malachi Mata III      Discharge Date: 10/30/2017     Final Diagnosis:   Left cervical radiculopathy    Disposition: Home or self care    Patient Instructions:   Discharge Medication List as of 10/30/2017  1:52 PM      CONTINUE these medications which have NOT CHANGED    Details   CONTOUR NEXT STRIPS Strp Test tid, Normal      fluticasone (FLONASE) 50 mcg/actuation nasal spray Starting Mon 10/2/2017, Historical Med      gabapentin (NEURONTIN) 300 MG capsule Take 1 capsule (300 mg total) by mouth 2 (two) times daily., Starting Fri 10/6/2017, Until Sat 10/6/2018, Normal      metformin (GLUCOPHAGE) 500 MG tablet Take 1 tablet (500 mg total) by mouth 2 (two) times daily with meals., Starting Thu 10/12/2017, Until Wed 1/10/2018, Normal      rosuvastatin (CRESTOR) 5 MG tablet Take 1 tablet (5 mg total) by mouth once daily., Starting Fri 10/13/2017, Normal      sildenafil (VIAGRA) 25 MG tablet Take 1 tablet (25 mg total) by mouth as needed for Erectile Dysfunction., Starting Wed 9/27/2017, Until Thu 9/27/2018, Normal                 Discharge Diagnosis: Same as Pre and Post Procedure  Condition on Discharge: Stable.  Diet on Discharge: Same as before.  Activity: as per instruction sheet.  Discharge to: Home with a responsible adult.  Follow up: as per Discharge instructions.

## 2017-11-07 ENCOUNTER — PATIENT MESSAGE (OUTPATIENT)
Dept: INTERNAL MEDICINE | Facility: CLINIC | Age: 47
End: 2017-11-07

## 2017-11-07 DIAGNOSIS — N52.01 ERECTILE DYSFUNCTION DUE TO ARTERIAL INSUFFICIENCY: ICD-10-CM

## 2017-11-07 RX ORDER — SILDENAFIL 25 MG/1
25 TABLET, FILM COATED ORAL
Qty: 5 TABLET | Refills: 3 | Status: SHIPPED | OUTPATIENT
Start: 2017-11-07 | End: 2017-12-11 | Stop reason: SDUPTHER

## 2017-11-07 NOTE — TELEPHONE ENCOUNTER
Please advise if the following can be updated per pt request:  gabapentin 300 MG capsule 10/6/2017 Osmel Nair Pain has subsided since steroid injections.

## 2017-11-09 ENCOUNTER — LAB VISIT (OUTPATIENT)
Dept: LAB | Facility: OTHER | Age: 47
End: 2017-11-09
Attending: FAMILY MEDICINE
Payer: COMMERCIAL

## 2017-11-09 DIAGNOSIS — E11.9 TYPE 2 DIABETES MELLITUS WITHOUT COMPLICATION: ICD-10-CM

## 2017-11-09 LAB
CREAT UR-MCNC: 13.8 MG/DL
MICROALBUMIN UR DL<=1MG/L-MCNC: <2.5 UG/ML
MICROALBUMIN/CREATININE RATIO: ABNORMAL UG/MG

## 2017-11-09 PROCEDURE — 82570 ASSAY OF URINE CREATININE: CPT

## 2017-11-16 ENCOUNTER — TELEPHONE (OUTPATIENT)
Dept: INTERNAL MEDICINE | Facility: CLINIC | Age: 47
End: 2017-11-16

## 2017-11-16 NOTE — TELEPHONE ENCOUNTER
Pt has been informed of lab/urine results.  States verbal understanding. Patient has no further questions or concerns.

## 2017-11-16 NOTE — TELEPHONE ENCOUNTER
----- Message from Kam Phipps MD sent at 11/10/2017  9:13 AM CST -----  Urine and A1c are normal.  No changes in medications.  Followup as scheduled.

## 2017-11-22 ENCOUNTER — TELEPHONE (OUTPATIENT)
Dept: PAIN MEDICINE | Facility: CLINIC | Age: 47
End: 2017-11-22

## 2017-11-22 NOTE — TELEPHONE ENCOUNTER
I called to schedule Mr. Mims follow up appt, but was unable to reach pt. I left a message with my direct line.

## 2017-12-11 DIAGNOSIS — N52.01 ERECTILE DYSFUNCTION DUE TO ARTERIAL INSUFFICIENCY: ICD-10-CM

## 2017-12-11 RX ORDER — SILDENAFIL 25 MG/1
25 TABLET, FILM COATED ORAL
Qty: 5 TABLET | Refills: 3 | Status: SHIPPED | OUTPATIENT
Start: 2017-12-11 | End: 2018-02-12 | Stop reason: SDUPTHER

## 2018-02-02 DIAGNOSIS — Z00.00 PREVENTATIVE HEALTH CARE: ICD-10-CM

## 2018-02-02 DIAGNOSIS — E78.01 HYPERLIPIDEMIA TYPE II: ICD-10-CM

## 2018-02-02 DIAGNOSIS — E11.9 TYPE 2 DIABETES MELLITUS WITHOUT COMPLICATION, WITHOUT LONG-TERM CURRENT USE OF INSULIN: ICD-10-CM

## 2018-02-02 RX ORDER — METFORMIN HYDROCHLORIDE 500 MG/1
500 TABLET ORAL 2 TIMES DAILY WITH MEALS
Qty: 180 TABLET | Refills: 1 | Status: SHIPPED | OUTPATIENT
Start: 2018-02-02 | End: 2018-04-03 | Stop reason: SDUPTHER

## 2018-02-12 DIAGNOSIS — N52.01 ERECTILE DYSFUNCTION DUE TO ARTERIAL INSUFFICIENCY: ICD-10-CM

## 2018-02-12 RX ORDER — SILDENAFIL 25 MG/1
25 TABLET, FILM COATED ORAL
Qty: 5 TABLET | Refills: 3 | Status: SHIPPED | OUTPATIENT
Start: 2018-02-12 | End: 2018-08-03

## 2018-02-26 DIAGNOSIS — E78.01 HYPERLIPIDEMIA TYPE II: ICD-10-CM

## 2018-02-26 DIAGNOSIS — Z00.00 PREVENTATIVE HEALTH CARE: ICD-10-CM

## 2018-02-26 DIAGNOSIS — E11.9 TYPE 2 DIABETES MELLITUS WITHOUT COMPLICATION, WITHOUT LONG-TERM CURRENT USE OF INSULIN: ICD-10-CM

## 2018-02-26 RX ORDER — BLOOD SUGAR DIAGNOSTIC
STRIP MISCELLANEOUS
Qty: 300 EACH | Refills: 11 | Status: SHIPPED | OUTPATIENT
Start: 2018-02-26 | End: 2018-04-03 | Stop reason: SDUPTHER

## 2018-03-27 ENCOUNTER — PATIENT MESSAGE (OUTPATIENT)
Dept: INTERNAL MEDICINE | Facility: CLINIC | Age: 48
End: 2018-03-27

## 2018-03-27 DIAGNOSIS — Z00.00 ANNUAL PHYSICAL EXAM: Primary | ICD-10-CM

## 2018-04-03 ENCOUNTER — LAB VISIT (OUTPATIENT)
Dept: LAB | Facility: OTHER | Age: 48
End: 2018-04-03
Attending: FAMILY MEDICINE
Payer: COMMERCIAL

## 2018-04-03 DIAGNOSIS — Z00.00 PREVENTATIVE HEALTH CARE: ICD-10-CM

## 2018-04-03 DIAGNOSIS — E78.01 HYPERLIPIDEMIA TYPE II: ICD-10-CM

## 2018-04-03 DIAGNOSIS — Z00.00 ANNUAL PHYSICAL EXAM: ICD-10-CM

## 2018-04-03 DIAGNOSIS — E11.9 TYPE 2 DIABETES MELLITUS WITHOUT COMPLICATION, WITHOUT LONG-TERM CURRENT USE OF INSULIN: ICD-10-CM

## 2018-04-03 LAB
ALBUMIN SERPL BCP-MCNC: 4.2 G/DL
ALP SERPL-CCNC: 72 U/L
ALT SERPL W/O P-5'-P-CCNC: 41 U/L
ANION GAP SERPL CALC-SCNC: 9 MMOL/L
AST SERPL-CCNC: 22 U/L
BASOPHILS # BLD AUTO: 0.04 K/UL
BASOPHILS NFR BLD: 0.7 %
BILIRUB SERPL-MCNC: 0.5 MG/DL
BUN SERPL-MCNC: 15 MG/DL
CALCIUM SERPL-MCNC: 9.7 MG/DL
CHLORIDE SERPL-SCNC: 105 MMOL/L
CHOLEST SERPL-MCNC: 177 MG/DL
CHOLEST/HDLC SERPL: 4.7 {RATIO}
CO2 SERPL-SCNC: 26 MMOL/L
CREAT SERPL-MCNC: 1 MG/DL
DIFFERENTIAL METHOD: ABNORMAL
EOSINOPHIL # BLD AUTO: 0.1 K/UL
EOSINOPHIL NFR BLD: 2.5 %
ERYTHROCYTE [DISTWIDTH] IN BLOOD BY AUTOMATED COUNT: 12.8 %
EST. GFR  (AFRICAN AMERICAN): >60 ML/MIN/1.73 M^2
EST. GFR  (NON AFRICAN AMERICAN): >60 ML/MIN/1.73 M^2
ESTIMATED AVG GLUCOSE: 123 MG/DL
GLUCOSE SERPL-MCNC: 138 MG/DL
HBA1C MFR BLD HPLC: 5.9 %
HCT VFR BLD AUTO: 46.8 %
HDLC SERPL-MCNC: 38 MG/DL
HDLC SERPL: 21.5 %
HGB BLD-MCNC: 15.6 G/DL
LDLC SERPL CALC-MCNC: 104.4 MG/DL
LYMPHOCYTES # BLD AUTO: 2.4 K/UL
LYMPHOCYTES NFR BLD: 43 %
MCH RBC QN AUTO: 32.4 PG
MCHC RBC AUTO-ENTMCNC: 33.3 G/DL
MCV RBC AUTO: 97 FL
MONOCYTES # BLD AUTO: 0.5 K/UL
MONOCYTES NFR BLD: 8.5 %
NEUTROPHILS # BLD AUTO: 2.6 K/UL
NEUTROPHILS NFR BLD: 45.1 %
NONHDLC SERPL-MCNC: 139 MG/DL
PLATELET # BLD AUTO: 281 K/UL
PMV BLD AUTO: 9.4 FL
POTASSIUM SERPL-SCNC: 4.2 MMOL/L
PROT SERPL-MCNC: 6.8 G/DL
RBC # BLD AUTO: 4.81 M/UL
SODIUM SERPL-SCNC: 140 MMOL/L
TRIGL SERPL-MCNC: 173 MG/DL
TSH SERPL DL<=0.005 MIU/L-ACNC: 2 UIU/ML
WBC # BLD AUTO: 5.65 K/UL

## 2018-04-03 PROCEDURE — 83036 HEMOGLOBIN GLYCOSYLATED A1C: CPT

## 2018-04-03 PROCEDURE — 80061 LIPID PANEL: CPT

## 2018-04-03 PROCEDURE — 80053 COMPREHEN METABOLIC PANEL: CPT

## 2018-04-03 PROCEDURE — 36415 COLL VENOUS BLD VENIPUNCTURE: CPT

## 2018-04-03 PROCEDURE — 84443 ASSAY THYROID STIM HORMONE: CPT

## 2018-04-03 PROCEDURE — 85025 COMPLETE CBC W/AUTO DIFF WBC: CPT

## 2018-04-03 RX ORDER — BLOOD SUGAR DIAGNOSTIC
STRIP MISCELLANEOUS
Qty: 300 EACH | Refills: 11 | Status: SHIPPED | OUTPATIENT
Start: 2018-04-03 | End: 2019-04-05 | Stop reason: SDUPTHER

## 2018-04-03 RX ORDER — METFORMIN HYDROCHLORIDE 500 MG/1
500 TABLET ORAL 2 TIMES DAILY WITH MEALS
Qty: 180 TABLET | Refills: 1 | Status: SHIPPED | OUTPATIENT
Start: 2018-04-03 | End: 2018-04-16 | Stop reason: SDUPTHER

## 2018-04-12 DIAGNOSIS — E11.9 TYPE 2 DIABETES MELLITUS WITHOUT COMPLICATION, WITHOUT LONG-TERM CURRENT USE OF INSULIN: ICD-10-CM

## 2018-04-12 DIAGNOSIS — E78.01 HYPERLIPIDEMIA TYPE II: ICD-10-CM

## 2018-04-12 DIAGNOSIS — Z00.00 PREVENTATIVE HEALTH CARE: ICD-10-CM

## 2018-04-12 RX ORDER — BLOOD SUGAR DIAGNOSTIC
STRIP MISCELLANEOUS
Qty: 300 STRIP | Refills: 0 | Status: SHIPPED | OUTPATIENT
Start: 2018-04-12 | End: 2019-07-26

## 2018-04-15 ENCOUNTER — PATIENT MESSAGE (OUTPATIENT)
Dept: INTERNAL MEDICINE | Facility: CLINIC | Age: 48
End: 2018-04-15

## 2018-04-16 DIAGNOSIS — E78.01 HYPERLIPIDEMIA TYPE II: ICD-10-CM

## 2018-04-16 DIAGNOSIS — E11.9 TYPE 2 DIABETES MELLITUS WITHOUT COMPLICATION, WITHOUT LONG-TERM CURRENT USE OF INSULIN: ICD-10-CM

## 2018-04-16 DIAGNOSIS — Z00.00 PREVENTATIVE HEALTH CARE: ICD-10-CM

## 2018-04-17 ENCOUNTER — PATIENT MESSAGE (OUTPATIENT)
Dept: INTERNAL MEDICINE | Facility: CLINIC | Age: 48
End: 2018-04-17

## 2018-04-17 RX ORDER — METFORMIN HYDROCHLORIDE 500 MG/1
500 TABLET ORAL 2 TIMES DAILY WITH MEALS
Qty: 180 TABLET | Refills: 1 | Status: SHIPPED | OUTPATIENT
Start: 2018-04-17 | End: 2018-08-06 | Stop reason: SDUPTHER

## 2018-04-17 NOTE — TELEPHONE ENCOUNTER
Blood work looked very good.  No changes in medication.  Return to clinic in 6 months with blood work prior.

## 2018-07-31 DIAGNOSIS — E78.01 HYPERLIPIDEMIA TYPE II: ICD-10-CM

## 2018-07-31 DIAGNOSIS — E11.9 TYPE 2 DIABETES MELLITUS WITHOUT COMPLICATION, WITHOUT LONG-TERM CURRENT USE OF INSULIN: ICD-10-CM

## 2018-07-31 DIAGNOSIS — Z00.00 PREVENTATIVE HEALTH CARE: ICD-10-CM

## 2018-07-31 RX ORDER — ROSUVASTATIN CALCIUM 5 MG/1
5 TABLET, COATED ORAL DAILY
Qty: 90 TABLET | Refills: 3 | Status: CANCELLED | OUTPATIENT
Start: 2018-07-31

## 2018-08-06 ENCOUNTER — OFFICE VISIT (OUTPATIENT)
Dept: INTERNAL MEDICINE | Facility: CLINIC | Age: 48
End: 2018-08-06
Attending: FAMILY MEDICINE
Payer: COMMERCIAL

## 2018-08-06 VITALS
DIASTOLIC BLOOD PRESSURE: 82 MMHG | HEIGHT: 70 IN | OXYGEN SATURATION: 96 % | WEIGHT: 212.31 LBS | HEART RATE: 75 BPM | SYSTOLIC BLOOD PRESSURE: 120 MMHG | BODY MASS INDEX: 30.39 KG/M2

## 2018-08-06 DIAGNOSIS — E11.9 TYPE 2 DIABETES MELLITUS WITHOUT COMPLICATION, WITHOUT LONG-TERM CURRENT USE OF INSULIN: ICD-10-CM

## 2018-08-06 DIAGNOSIS — Z00.00 PREVENTATIVE HEALTH CARE: Primary | ICD-10-CM

## 2018-08-06 DIAGNOSIS — E78.01 HYPERLIPIDEMIA TYPE II: ICD-10-CM

## 2018-08-06 PROCEDURE — 99396 PREV VISIT EST AGE 40-64: CPT | Mod: S$GLB,,, | Performed by: FAMILY MEDICINE

## 2018-08-06 PROCEDURE — 99999 PR PBB SHADOW E&M-EST. PATIENT-LVL III: CPT | Mod: PBBFAC,,, | Performed by: FAMILY MEDICINE

## 2018-08-06 PROCEDURE — 3044F HG A1C LEVEL LT 7.0%: CPT | Mod: CPTII,S$GLB,, | Performed by: FAMILY MEDICINE

## 2018-08-06 RX ORDER — METFORMIN HYDROCHLORIDE 500 MG/1
500 TABLET ORAL 2 TIMES DAILY WITH MEALS
Qty: 180 TABLET | Refills: 1 | Status: SHIPPED | OUTPATIENT
Start: 2018-08-06 | End: 2018-11-04

## 2018-08-06 RX ORDER — ROSUVASTATIN CALCIUM 5 MG/1
5 TABLET, COATED ORAL DAILY
Qty: 90 TABLET | Refills: 3 | Status: SHIPPED | OUTPATIENT
Start: 2018-08-06 | End: 2019-07-26 | Stop reason: SDUPTHER

## 2018-08-06 NOTE — PROGRESS NOTES
"CHIEF COMPLAINT: Annual exam     HISTORY OF PRESENT ILLNESS: The patient is a 47 year-old white male.  She  He is currently on metformin and Crestor.     The patient has a history of diabetes.  Recent blood sugars have been less than 120.  There have been no episodes of hypoglycemia.    The patient has a history of stable hyperlipidemia on current medications.  The patient denies chest pain or shortness of breath today.  The patient denies muscle aches or myalgias suggestive of myositis.    REVIEW OF SYSTEMS:   GENERAL: No fever, chills, weight loss.   SKIN: No rashes, itching or changes in color or texture of skin.   HEAD: No headaches or recent head trauma.   EYES: Visual acuity fine. No photophobia, ocular pain or diplopia.   EARS: Denies ear pain, discharge or vertigo.   NOSE: No loss of smell, no epistaxis or postnasal drip.   MOUTH & THROAT: No hoarseness or change in voice. No excessive gum bleeding.   NODES: Denies swollen glands.   CHEST: Denies JASMINE, cyanosis, wheezing, cough and sputum production.   CARDIOVASCULAR: Denies chest pain, PND, orthopnea or reduced exercise tolerance.   ABDOMEN: Appetite fine. No weight loss. Denies diarrhea, abdominal pain, hematemesis or blood in stool.   URINARY: No flank pain, dysuria or hematuria.   PERIPHERAL VASCULAR: No claudication or cyanosis.   MUSCULOSKELETAL: No joint stiffness or swelling. Denies back pain.   NEUROLOGIC: No history of seizures, paralysis, alteration of gait or coordination.     SOCIAL HISTORY: The patient does not smoke. The patient consumes alcohol socially. The patient is single. He is an     PHYSICAL EXAMINATION:   Blood pressure 120/82, pulse 75, height 5' 10" (1.778 m), weight 96.3 kg (212 lb 4.9 oz), SpO2 96 %.    APPEARANCE: Well nourished, well developed, in no acute distress.   HEAD: Normocephalic, atraumatic.   EYES: PERRL. EOMI. Conjunctivae without injection and anicteric   EARS: TM's intact. Light reflex normal. No " retraction or perforation.   NOSE: Mucosa pink. Airway clear.   MOUTH & THROAT: No tonsillar enlargement. No pharyngeal erythema or exudate. No stridor.   NECK: Supple.   NODES: No cervical, axillary or inguinal lymph node enlargement.   CHEST: Lungs clear to auscultation. No retractions are noted. No rales or rhonchi are present.   CARDIOVASCULAR: Normal S1, S2. No rubs, murmurs or gallops.   ABDOMEN: Bowel sounds normal. Not distended. Soft. No tenderness or masses. No ascites is noted.   MUSCULOSKELETAL: There is no clubbing, cyanosis, or edema of the extremities x4. There is full range of motion of the lumbar spine. There is full range of motion of the extremities x4. There is no deformity noted.   NEUROLOGIC:   Normal speech development.   Hearing normal.   Normal gait.   Motor and sensory exams grossly normal.   DTR's normal.   PSYCHIATRIC: Patient is alert and oriented x3. Thought processes are all normal. There is no homicidality. There is no suicidality. There is no evidence of psychosis.     LABORATORY/RADIOLOGY: Chart reviewed.      ASSESSMENT:   Normal annual exam   Type 2 diabetes   Hyperlipidemia   Family history DM  Family history of heart disease    PLAN:   he is doing well.  His A1c is 6 today.  Cholesterol panel looks good.  Liver functions are normal.  Return to clinic in 6 months with blood work prior      Answers for HPI/ROS submitted by the patient on 8/2/2018   Diabetes problem  Diabetes type: type 2  MedicAlert ID: Yes  Disease duration: 2 years  blurred vision: No  chest pain: No  fatigue: No  foot paresthesias: No  foot ulcerations: No  polydipsia: No  polyphagia: No  polyuria: No  visual change: No  weakness: No  weight loss: No  Symptom course: stable  confusion: No  dizziness: No  headaches: No  hunger: No  mood changes: No  nervous/anxious: No  pallor: No  seizures: No  sleepiness: No  speech difficulty: No  sweats: No  tremors: No  blackouts: No  hospitalization: No  nocturnal  hypoglycemia: No  required assistance: No  required glucagon: No  CVA: No  heart disease: No  impotence: Yes  nephropathy: No  peripheral neuropathy: No  PVD: No  retinopathy: No  autonomic neuropathy: No  CAD risks: family history, obesity, stress, tobacco exposure, diabetes mellitus, male sex  Current treatments: oral agent (monotherapy)  Treatment compliance: all of the time  Home blood tests: 3-4 x per day  Monitoring compliance: excellent  Blood glucose trend: decreasing steadily  Weight trend: stable  Current diet: generally healthy  Meal planning: avoidance of concentrated sweets  Exercise: daily  Dietitian visit: No  Eye exam current: Yes  Sees podiatrist: Yes

## 2018-09-13 ENCOUNTER — OFFICE VISIT (OUTPATIENT)
Dept: PODIATRY | Facility: CLINIC | Age: 48
End: 2018-09-13
Payer: COMMERCIAL

## 2018-09-13 VITALS
HEIGHT: 70 IN | RESPIRATION RATE: 18 BRPM | BODY MASS INDEX: 29.92 KG/M2 | HEART RATE: 79 BPM | SYSTOLIC BLOOD PRESSURE: 117 MMHG | DIASTOLIC BLOOD PRESSURE: 79 MMHG | WEIGHT: 209 LBS

## 2018-09-13 DIAGNOSIS — E11.9 TYPE 2 DIABETES MELLITUS WITHOUT COMPLICATION, WITHOUT LONG-TERM CURRENT USE OF INSULIN: Primary | ICD-10-CM

## 2018-09-13 PROCEDURE — 3044F HG A1C LEVEL LT 7.0%: CPT | Mod: CPTII,S$GLB,, | Performed by: PODIATRIST

## 2018-09-13 PROCEDURE — 99213 OFFICE O/P EST LOW 20 MIN: CPT | Mod: S$GLB,,, | Performed by: PODIATRIST

## 2018-09-13 PROCEDURE — 3008F BODY MASS INDEX DOCD: CPT | Mod: CPTII,S$GLB,, | Performed by: PODIATRIST

## 2018-09-13 PROCEDURE — 99999 PR PBB SHADOW E&M-EST. PATIENT-LVL III: CPT | Mod: PBBFAC,,, | Performed by: PODIATRIST

## 2018-09-17 NOTE — PROGRESS NOTES
Subjective:      Patient ID: Osmel Nair is a 48 y.o. male.    Chief Complaint: PCP (Kam Phipps MD 8/06/18) and Diabetic Foot Exam    Osmel is a 48 y.o. male who presents to the clinic upon referral from Dr. Eli parsons. provider found  for evaluation and treatment of diabetic feet. Osmel has a past medical history of Diabetes mellitus, type 2 (07/2016) and Lyme disease (2016). Patient relates no major problem with feet. Only complaints today consist of yearly DM foot examination].    PCP: Kam Phipps MD    Date Last Seen by PCP:   Chief Complaint   Patient presents with    PCP     Kam Phipps MD 8/06/18    Diabetic Foot Exam         Current shoe gear: Casual shoes    Hemoglobin A1C   Date Value Ref Range Status   04/03/2018 5.9 (H) 4.0 - 5.6 % Final     Comment:     According to ADA guidelines, hemoglobin A1c <7.0% represents  optimal control in non-pregnant diabetic patients. Different  metrics may apply to specific patient populations.   Standards of Medical Care in Diabetes-2016.  For the purpose of screening for the presence of diabetes:  <5.7%     Consistent with the absence of diabetes  5.7-6.4%  Consistent with increasing risk for diabetes   (prediabetes)  >or=6.5%  Consistent with diabetes  Currently, no consensus exists for use of hemoglobin A1c  for diagnosis of diabetes for children.  This Hemoglobin A1c assay has significant interference with fetal   hemoglobin   (HbF). The results are invalid for patients with abnormal amounts of   HbF,   including those with known Hereditary Persistence   of Fetal Hemoglobin. Heterozygous hemoglobin variants (HbAS, HbAC,   HbAD, HbAE, HbA2) do not significantly interfere with this assay;   however, presence of multiple variants in a sample may impact the %   interference.     11/09/2017 6.2 (H) 4.0 - 5.6 % Final     Comment:     According to ADA guidelines, hemoglobin A1c <7.0% represents  optimal control in non-pregnant  diabetic patients. Different  metrics may apply to specific patient populations.   Standards of Medical Care in Diabetes-2016.  For the purpose of screening for the presence of diabetes:  <5.7%     Consistent with the absence of diabetes  5.7-6.4%  Consistent with increasing risk for diabetes   (prediabetes)  >or=6.5%  Consistent with diabetes  Currently, no consensus exists for use of hemoglobin A1c  for diagnosis of diabetes for children.  This Hemoglobin A1c assay has significant interference with fetal   hemoglobin   (HbF). The results are invalid for patients with abnormal amounts of   HbF,   including those with known Hereditary Persistence   of Fetal Hemoglobin. Heterozygous hemoglobin variants (HbAS, HbAC,   HbAD, HbAE, HbA2) do not significantly interfere with this assay;   however, presence of multiple variants in a sample may impact the %   interference.     05/22/2017 6.1 4.5 - 6.2 % Final     Comment:     According to ADA guidelines, hemoglobin A1C <7.0% represents  optimal control in non-pregnant diabetic patients.  Different  metrics may apply to specific populations.   Standards of Medical Care in Diabetes - 2016.  For the purpose of screening for the presence of diabetes:  <5.7%     Consistent with the absence of diabetes  5.7-6.4%  Consistent with increasing risk for diabetes   (prediabetes)  >or=6.5%  Consistent with diabetes  Currently no consensus exists for use of hemoglobin A1C  for diagnosis of diabetes for children.             Review of Systems   Constitution: Negative for chills, decreased appetite and fever.   Cardiovascular: Negative for leg swelling.   Skin: Positive for dry skin.   Musculoskeletal: Negative for arthritis, joint pain, joint swelling and myalgias.   Gastrointestinal: Negative for nausea and vomiting.   Neurological: Negative for loss of balance, numbness and paresthesias.           Objective:       Vitals:    09/13/18 1554   BP: 117/79   Pulse: 79   Resp: 18   Weight:  "94.8 kg (209 lb)   Height: 5' 10" (1.778 m)   PainSc: 0-No pain        Physical Exam   Constitutional: He is oriented to person, place, and time. He appears well-developed and well-nourished.   Cardiovascular: Intact distal pulses.   Pulses:       Dorsalis pedis pulses are 2+ on the right side, and 2+ on the left side.        Posterior tibial pulses are 2+ on the right side, and 2+ on the left side.   dorsalis pedis and posterior tibial pulses are palpable bilaterally. Capillary refill time is within normal limits. + pedal hair growth          Musculoskeletal: Normal range of motion. He exhibits no edema or tenderness.   Adequate joint range of motion without pain, limitation, nor crepitation Bilateral feet and ankle joints. Muscle strength is 5/5 in all groups bilaterally.         Feet:   Right Foot:   Protective Sensation: 5 sites tested. 5 sites sensed.   Left Foot:   Protective Sensation: 5 sites tested. 5 sites sensed.   Neurological: He is alert and oriented to person, place, and time. He has normal strength. No sensory deficit.   York-Haley 5.07 monofilament is intact bilateral feet.      Skin: Skin is warm, dry and intact. No lesion and no rash noted. No erythema.   Nails 1-5 bilaterally  are normal in length, thickness, coloration and are non dystrophic.      Psychiatric: He has a normal mood and affect. His behavior is normal.   Vitals reviewed.            Assessment:       Encounter Diagnosis   Name Primary?    Type 2 diabetes mellitus without complication, without long-term current use of insulin Yes         Plan:       sOmel was seen today for pcp and diabetic foot exam.    Diagnoses and all orders for this visit:    Type 2 diabetes mellitus without complication, without long-term current use of insulin      I counseled the patient on his conditions, their implications and medical management.    - Shoe inspection. Diabetic Foot Education. Patient reminded of the importance of good nutrition and " blood sugar control to help prevent podiatric complications of diabetes. Patient instructed on proper foot hygeine. We discussed wearing proper shoe gear, daily foot inspections, never walking without protective shoe gear, caution putting sharp instruments to feet     - Discussed DM foot care:  Wear comfortable, proper fitting shoes. Wash feet daily. Dry well. After drying, apply moisturizer to feet (no lotion to webspaces). Inspect feet daily for skin breaks, blisters, swelling, or redness. Wear cotton socks (preferably white)  Change socks every day. Do NOT walk barefoot. Do NOT use heating pads or warm/hot water soaks     - Discussed importance of daily moisturizer to the feet such as Gold bonds diabetic foot cream    - Patient is low risk for developing lower extremity issues secondary to diabetes    - RTC in  1 year for a diabetic foot exam  or sooner if problems      .

## 2018-10-08 DIAGNOSIS — E78.01 HYPERLIPIDEMIA TYPE II: ICD-10-CM

## 2018-10-08 DIAGNOSIS — E11.9 TYPE 2 DIABETES MELLITUS WITHOUT COMPLICATION, WITHOUT LONG-TERM CURRENT USE OF INSULIN: ICD-10-CM

## 2018-10-08 DIAGNOSIS — Z00.00 PREVENTATIVE HEALTH CARE: ICD-10-CM

## 2018-10-08 RX ORDER — METFORMIN HYDROCHLORIDE 500 MG/1
TABLET ORAL
Qty: 180 TABLET | Refills: 0 | Status: SHIPPED | OUTPATIENT
Start: 2018-10-08 | End: 2019-01-02 | Stop reason: SDUPTHER

## 2018-10-22 ENCOUNTER — PATIENT MESSAGE (OUTPATIENT)
Dept: ADMINISTRATIVE | Facility: OTHER | Age: 48
End: 2018-10-22

## 2018-10-22 ENCOUNTER — OFFICE VISIT (OUTPATIENT)
Dept: PAIN MEDICINE | Facility: CLINIC | Age: 48
End: 2018-10-22
Attending: ANESTHESIOLOGY
Payer: COMMERCIAL

## 2018-10-22 VITALS
TEMPERATURE: 98 F | HEIGHT: 70 IN | BODY MASS INDEX: 29.78 KG/M2 | RESPIRATION RATE: 16 BRPM | SYSTOLIC BLOOD PRESSURE: 130 MMHG | WEIGHT: 208 LBS | DIASTOLIC BLOOD PRESSURE: 82 MMHG | HEART RATE: 86 BPM | OXYGEN SATURATION: 96 %

## 2018-10-22 DIAGNOSIS — E11.9 TYPE 2 DIABETES MELLITUS: ICD-10-CM

## 2018-10-22 DIAGNOSIS — M54.12 CERVICAL RADICULITIS: Primary | ICD-10-CM

## 2018-10-22 PROCEDURE — 99214 OFFICE O/P EST MOD 30 MIN: CPT | Mod: S$GLB,,, | Performed by: ANESTHESIOLOGY

## 2018-10-22 PROCEDURE — 3008F BODY MASS INDEX DOCD: CPT | Mod: CPTII,S$GLB,, | Performed by: ANESTHESIOLOGY

## 2018-10-22 RX ORDER — METHYLPREDNISOLONE 4 MG/1
TABLET ORAL
Qty: 1 PACKAGE | Refills: 0 | Status: SHIPPED | OUTPATIENT
Start: 2018-10-22 | End: 2018-11-12

## 2018-10-22 NOTE — PROGRESS NOTES
Chronic Pain - Established    INTERVAL HISTORY (10/22/2018):    Osmel Nair presents to the clinic today for a follow-up appointment for neck pain. The patient reports 100% pain relief after cervical MARIA LUZ performed on 10/30/2017 which lasted 11 months. He reports the pain in his neck is gradually starting to return. The pain started one week ago. No injury or inciting event.The pain is located in the left cervical paraspinal area and radiates into the left shoulder. Current pain intensity is 2/10.  He describes it as sharp sensation. The pain is exacerbated by cycling and head turning. Aleve provides mild-moderate relief.    Patient denies urinary incontinence, bowel incontinence, significant weight loss, significant motor weakness and loss of sensations.     SUBJECTIVE:    Osmel Nair is a 48 y/o male with hx of T2DM who presents to the clinic for the evaluation of left shoulder pain. The pain started 5 weeks ago and symptoms have been persistent. No trauma or inciting event. The pain is located in the left upper trapezius area and radiates down the arm into the thumb and index finger. The pain initially involved the neck although this has resolved.  The arm and hand pain is described as numb and tingling. The shoulder pain is described as sharp and shooting and is rated as 2/10. The pain is rated with a score of  2/10 on the BEST day and a score of 8/10 on the WORST day.  Symptoms interfere with daily activity, sleeping and work. The pain is exacerbated by sleeping, lots of physical exertion, and manual labor.  The pain is mitigated by rest and medication. He reports spending 0 hours per day reclining. The patient reports 6-8 hours of uninterrupted sleep per night.    Patient denies urinary incontinence, bowel incontinence, significant weight loss, significant motor weakness and loss of sensations.    Physical Therapy/Home Exercise: no      Pain Disability Index Review:  Last 3 PDI Scores 10/22/2018  10/6/2017 7/23/2015   Pain Disability Index (PDI) 17 25 6       Pain Medications:    - Aleve as needed     report:  Not applicable    Pain Procedures:   Cervical MARIA LUZ (10/30/2017)    Imaging:     Cervical MRI (10/2017):    Findings:    There is straightening of the normal cervical lordosis. The vertebral body heights and alignment are within normal limits. Intervertebral disk spaces are well preserved. Bone marrow signal is normal.    Visualized posterior fossa structures and cervical cord are unremarkable.    Limited evaluation of the neck soft tissues is unremarkable.    C2-3: No spinal canal stenosis or neuroforaminal narrowing.    C3-4: Posterior disc osteophyte complex formation and uncovertebral arthrosis resulting in mild bilateral neuroforaminal narrowing and no spinal canal stenosis.    C4-5: Posterior disc osteophyte complex formation and uncovertebral arthrosis resulting in mild bilateral neuroforaminal narrowing and no spinal canal stenosis.    C5-6: Posterior disc osteophyte complex formation with uncovertebral arthrosis resulting in moderate left and mild right neuroforaminal narrowing. No spinal canal stenosis    C6-7: Posterior disc osteophyte complex formation without spinal canal stenosis or neuroforaminal narrowing.      Cervical X-ray (7/2015):    Results: AP, lateral, bilateral oblique, flexion and extension views.  The alignment is normal, vertebral body height and disk spaces are well-maintained.  No significant osteophyte formation.  Flexion and extension views demonstrate no translational   abnormalities. Right oblique view demonstrates mild foramina narrowing on the right at C3-C4 and C4-C5 and left oblique view demonstrate a mild foramina narrowing at C3-C4, C4-C5 and C5-C6 failure No fracture or osseous lesion seen. Prevertebral soft   tissues appear normal.    Past Medical History:   Diagnosis Date    Diabetes mellitus, type 2 07/2016    Lyme disease 2016     Past Surgical History:    Procedure Laterality Date    KNEE ARTHROSCOPY Right 1989     Social History     Socioeconomic History    Marital status: Single     Spouse name: Not on file    Number of children: Not on file    Years of education: Not on file    Highest education level: Not on file   Social Needs    Financial resource strain: Not on file    Food insecurity - worry: Not on file    Food insecurity - inability: Not on file    Transportation needs - medical: Not on file    Transportation needs - non-medical: Not on file   Occupational History    Not on file   Tobacco Use    Smoking status: Current Every Day Smoker    Smokeless tobacco: Never Used   Substance and Sexual Activity    Alcohol use: Yes    Drug use: Not on file    Sexual activity: Not on file   Other Topics Concern    Not on file   Social History Narrative    Not on file     Family History   Problem Relation Age of Onset    Diabetes Mother     Diabetes Father     Diabetes Sister        Review of patient's allergies indicates:  No Known Allergies    Current Outpatient Medications   Medication Sig    CONTOUR NEXT STRIPS Strp Test tid    CONTOUR NEXT STRIPS Strp USE THREE TIMES DAILY    metFORMIN (GLUCOPHAGE) 500 MG tablet Take 1 tablet (500 mg total) by mouth 2 (two) times daily with meals.    metFORMIN (GLUCOPHAGE) 500 MG tablet TAKE 1 TABLET(500 MG) BY MOUTH TWICE DAILY WITH MEALS    rosuvastatin (CRESTOR) 5 MG tablet Take 1 tablet (5 mg total) by mouth once daily.    methylPREDNISolone (MEDROL DOSEPACK) 4 mg tablet use as directed     No current facility-administered medications for this visit.        REVIEW OF SYSTEMS:    GENERAL:  No weight loss, malaise or fevers.  HEENT:  Negative for frequent or significant headaches.  NECK:  Negative for lumps and significant neck swelling.  RESPIRATORY:  Negative for cough, wheezing or shortness of breath.  CARDIOVASCULAR:  Negative for chest pain, leg swelling or palpitations.  GI:  Negative for  "abdominal discomfort, blood in stools or black stools or change in bowel habits.  MUSCULOSKELETAL:  See HPI.  SKIN:  Negative for lesions, rash, and itching.  PSYCH:  Negative for sleep disturbance, mood disorder and recent psychosocial stressors.  HEMATOLOGY/LYMPHOLOGY:  Negative for prolonged bleeding, bruising easily or swollen nodes.  NEURO:   No history of paralysis, seizures or tremors.  All other reviewed and negative other than HPI.    OBJECTIVE:    /82   Pulse 86   Temp 98.4 °F (36.9 °C)   Resp 16   Ht 5' 10" (1.778 m)   Wt 94.3 kg (208 lb)   SpO2 96%   BMI 29.84 kg/m²     PHYSICAL EXAMINATION:    General appearance: Well appearing, in no acute distress, alert and oriented x3.  Psych:  Mood and affect appropriate.  Skin: Skin color, texture, turgor normal, no rashes or lesions, in both upper and lower body.  Head/face:  Normocephalic, atraumatic. No palpable lymph nodes.  Neck: Mild tenderness to palpation over the cervical paraspinous muscles on the LEFT. Mild pain with LEFT lateral rotation.  Full ROM.  Cor: Radial pulses 2+.  Pulm: Breathing unlabored.  GI:  Soft and non-tender.  Back: No pain to palpation over the spine or costovertebral angles. Normal range of motion without pain reproduction.  Extremities: Peripheral joint ROM is full and pain free without obvious instability or laxity in all four extremities. No deformities, edema, or skin discoloration.  Musculoskeletal: No atrophy or tone abnormalities are noted.  Neuro: Bilateral upper  extremity coordination and muscle stretch reflexes are physiologic and symmetric.  No loss of sensation is noted.      Strength   Deltoids Triceps Biceps Wrist Extension Wrist Flexion Hand    Upper: R 5/5 5/5 5/5 5/5 5/5 5/5     L 5/5 5/5 5/5 5/5 5/5 5/5     Gait: Normal.    ASSESSMENT:     1. Cervical radiculitis        PLAN:     - I have stressed the importance of physical activity and a home exercise plan to help with pain and improve health.  - " Start Medrol dose pack.  - In the future, I will consider repeat cervical MARIA LUZ if no improvement with above.  - Return to clinic with any new or worsening symptoms, or if symptoms persist.      The above plan and management options were discussed at length with patient. Patient is in agreement with the above and verbalized understanding. It will be communicated with the referring physician via electronic record, fax, or mail.    Malachi Mata III  10/22/2018

## 2018-10-25 DIAGNOSIS — E11.9 TYPE 2 DIABETES MELLITUS: ICD-10-CM

## 2018-11-13 PROBLEM — E11.9 TYPE 2 DIABETES MELLITUS WITHOUT COMPLICATION: Status: ACTIVE | Noted: 2017-03-20

## 2018-11-13 PROBLEM — E78.01 FAMILIAL HYPERCHOLESTEROLEMIA: Status: ACTIVE | Noted: 2017-03-20

## 2018-11-14 ENCOUNTER — OFFICE VISIT (OUTPATIENT)
Dept: INTERNAL MEDICINE | Facility: CLINIC | Age: 48
End: 2018-11-14
Attending: FAMILY MEDICINE
Payer: COMMERCIAL

## 2018-11-14 ENCOUNTER — LAB VISIT (OUTPATIENT)
Dept: LAB | Facility: HOSPITAL | Age: 48
End: 2018-11-14
Attending: FAMILY MEDICINE
Payer: COMMERCIAL

## 2018-11-14 VITALS
HEIGHT: 70 IN | HEART RATE: 86 BPM | WEIGHT: 208.31 LBS | DIASTOLIC BLOOD PRESSURE: 78 MMHG | OXYGEN SATURATION: 97 % | BODY MASS INDEX: 29.82 KG/M2 | SYSTOLIC BLOOD PRESSURE: 124 MMHG

## 2018-11-14 DIAGNOSIS — E11.9 TYPE 2 DIABETES MELLITUS WITHOUT COMPLICATION, WITHOUT LONG-TERM CURRENT USE OF INSULIN: Primary | ICD-10-CM

## 2018-11-14 DIAGNOSIS — E78.01 HYPERLIPIDEMIA TYPE II: ICD-10-CM

## 2018-11-14 DIAGNOSIS — E11.9 TYPE 2 DIABETES MELLITUS WITHOUT COMPLICATION, WITHOUT LONG-TERM CURRENT USE OF INSULIN: ICD-10-CM

## 2018-11-14 LAB
ALBUMIN/CREAT UR: 3.9 UG/MG
CREAT UR-MCNC: 76 MG/DL
MICROALBUMIN UR DL<=1MG/L-MCNC: 3 UG/ML

## 2018-11-14 PROCEDURE — 3008F BODY MASS INDEX DOCD: CPT | Mod: CPTII,S$GLB,, | Performed by: FAMILY MEDICINE

## 2018-11-14 PROCEDURE — 99999 PR PBB SHADOW E&M-EST. PATIENT-LVL III: CPT | Mod: PBBFAC,,, | Performed by: FAMILY MEDICINE

## 2018-11-14 PROCEDURE — 99214 OFFICE O/P EST MOD 30 MIN: CPT | Mod: 25,S$GLB,, | Performed by: FAMILY MEDICINE

## 2018-11-14 PROCEDURE — 82043 UR ALBUMIN QUANTITATIVE: CPT

## 2018-11-14 PROCEDURE — 90471 IMMUNIZATION ADMIN: CPT | Mod: S$GLB,,, | Performed by: FAMILY MEDICINE

## 2018-11-14 PROCEDURE — 90686 IIV4 VACC NO PRSV 0.5 ML IM: CPT | Mod: S$GLB,,, | Performed by: FAMILY MEDICINE

## 2018-11-14 PROCEDURE — 3044F HG A1C LEVEL LT 7.0%: CPT | Mod: CPTII,S$GLB,, | Performed by: FAMILY MEDICINE

## 2018-11-14 NOTE — PROGRESS NOTES
"Patient was given vaccine information sheet for the Flu Vaccine. The area of injection was palpated using the acromion process as a landmark. This area was cleaned with alcohol. Using a 25g 1" safety needle, 0.5mL of the vaccine was placed into the left muscle. The injection site was dressed with a bandage. Patient experienced no complications and was discharged in stable condition. Fluzone vaccine Lot: [id number] Exp: 06/30/2019.  Suma Bermudez LPN      "

## 2018-11-14 NOTE — PROGRESS NOTES
"CHIEF COMPLAINT: DM check up     HISTORY OF PRESENT ILLNESS: The patient is a 48 year-old white male.  She  He is currently on metformin and Crestor.     The patient has a history of diabetes.  Recent blood sugars have been less than 120.  There have been no episodes of hypoglycemia.    The patient has a history of stable hyperlipidemia on current medications.  The patient denies chest pain or shortness of breath today.  The patient denies muscle aches or myalgias suggestive of myositis.    REVIEW OF SYSTEMS:   GENERAL: No fever, chills, weight loss.   SKIN: No rashes, itching or changes in color or texture of skin.   HEAD: No headaches or recent head trauma.   EYES: Visual acuity fine. No photophobia, ocular pain or diplopia.   EARS: Denies ear pain, discharge or vertigo.   NOSE: No loss of smell, no epistaxis or postnasal drip.   MOUTH & THROAT: No hoarseness or change in voice. No excessive gum bleeding.   NODES: Denies swollen glands.   CHEST: Denies JASMINE, cyanosis, wheezing, cough and sputum production.   CARDIOVASCULAR: Denies chest pain, PND, orthopnea or reduced exercise tolerance.   ABDOMEN: Appetite fine. No weight loss. Denies diarrhea, abdominal pain, hematemesis or blood in stool.   URINARY: No flank pain, dysuria or hematuria.   PERIPHERAL VASCULAR: No claudication or cyanosis.   MUSCULOSKELETAL: No joint stiffness or swelling. Denies back pain.   NEUROLOGIC: No history of seizures, paralysis, alteration of gait or coordination.     SOCIAL HISTORY: The patient does not smoke. The patient consumes alcohol socially. The patient is single. He is an     PHYSICAL EXAMINATION:   Blood pressure 124/78, pulse 86, height 5' 10" (1.778 m), weight 94.5 kg (208 lb 5.4 oz), SpO2 97 %.    APPEARANCE: Well nourished, well developed, in no acute distress.   HEAD: Normocephalic, atraumatic.   EYES: PERRL. EOMI. Conjunctivae without injection and anicteric   EARS: TM's intact. Light reflex normal. No " retraction or perforation.   NOSE: Mucosa pink. Airway clear.   MOUTH & THROAT: No tonsillar enlargement. No pharyngeal erythema or exudate. No stridor.   NECK: Supple.   NODES: No cervical, axillary or inguinal lymph node enlargement.   CHEST: Lungs clear to auscultation. No retractions are noted. No rales or rhonchi are present.   CARDIOVASCULAR: Normal S1, S2. No rubs, murmurs or gallops.   ABDOMEN: Bowel sounds normal. Not distended. Soft. No tenderness or masses. No ascites is noted.   MUSCULOSKELETAL: There is no clubbing, cyanosis, or edema of the extremities x4. There is full range of motion of the lumbar spine. There is full range of motion of the extremities x4. There is no deformity noted.   NEUROLOGIC:   Normal speech development.   Hearing normal.   Normal gait.   Motor and sensory exams grossly normal.   DTR's normal.   PSYCHIATRIC: Patient is alert and oriented x3. Thought processes are all normal. There is no homicidality. There is no suicidality. There is no evidence of psychosis.     LABORATORY/RADIOLOGY: Chart reviewed.      ASSESSMENT:   Type 2 diabetes   Hyperlipidemia   Family history DM  Family history of heart disease    PLAN:   he is doing well.  His A1c is 6 today.  Cholesterol panel looks good.  Liver functions are normal.  Return to clinic in 6 months with blood work prior

## 2018-11-15 ENCOUNTER — TELEPHONE (OUTPATIENT)
Dept: INTERNAL MEDICINE | Facility: CLINIC | Age: 48
End: 2018-11-15

## 2018-11-15 NOTE — TELEPHONE ENCOUNTER
Call placed to pt regarding lab results. The patient verbalized understanding and had no further questions or concerns.  Suma Bermudez LPN

## 2018-11-15 NOTE — TELEPHONE ENCOUNTER
----- Message from Kam Phipps MD sent at 11/14/2018  4:43 PM CST -----  Urine shows no damage to the kidneys from the diabetes.  No changes in medications.  Followup as scheduled.

## 2019-01-02 DIAGNOSIS — Z00.00 PREVENTATIVE HEALTH CARE: ICD-10-CM

## 2019-01-02 DIAGNOSIS — E11.9 TYPE 2 DIABETES MELLITUS WITHOUT COMPLICATION, WITHOUT LONG-TERM CURRENT USE OF INSULIN: ICD-10-CM

## 2019-01-02 DIAGNOSIS — E78.01 HYPERLIPIDEMIA TYPE II: ICD-10-CM

## 2019-01-02 RX ORDER — METFORMIN HYDROCHLORIDE 500 MG/1
TABLET ORAL
Qty: 180 TABLET | Refills: 0 | Status: SHIPPED | OUTPATIENT
Start: 2019-01-02 | End: 2019-04-05 | Stop reason: SDUPTHER

## 2019-01-21 RX ORDER — AZITHROMYCIN 250 MG/1
TABLET, FILM COATED ORAL
Qty: 6 TABLET | Refills: 0 | Status: SHIPPED | OUTPATIENT
Start: 2019-01-21 | End: 2019-01-26

## 2019-01-21 NOTE — TELEPHONE ENCOUNTER
----- Message from Mariya Caceres sent at 1/21/2019 11:43 AM CST -----  Contact: Self  Name of Who is Calling: AAMIR SNIDER [[id number]]  What is the request in detail: [facility] [address] [place] [address] AT [address] Please contact to further discuss and advise      Can the clinic reply by [facility]:     What [facility]: [phone]  [place]

## 2019-03-13 ENCOUNTER — PATIENT MESSAGE (OUTPATIENT)
Dept: INTERNAL MEDICINE | Facility: CLINIC | Age: 49
End: 2019-03-13

## 2019-03-14 ENCOUNTER — OFFICE VISIT (OUTPATIENT)
Dept: INTERNAL MEDICINE | Facility: CLINIC | Age: 49
End: 2019-03-14
Attending: FAMILY MEDICINE
Payer: COMMERCIAL

## 2019-03-14 ENCOUNTER — LAB VISIT (OUTPATIENT)
Dept: LAB | Facility: OTHER | Age: 49
End: 2019-03-14
Attending: FAMILY MEDICINE
Payer: COMMERCIAL

## 2019-03-14 ENCOUNTER — TELEPHONE (OUTPATIENT)
Dept: INTERNAL MEDICINE | Facility: CLINIC | Age: 49
End: 2019-03-14

## 2019-03-14 VITALS
HEART RATE: 78 BPM | DIASTOLIC BLOOD PRESSURE: 80 MMHG | BODY MASS INDEX: 29.61 KG/M2 | WEIGHT: 206.81 LBS | OXYGEN SATURATION: 98 % | SYSTOLIC BLOOD PRESSURE: 108 MMHG | HEIGHT: 70 IN

## 2019-03-14 DIAGNOSIS — Z00.00 PREVENTATIVE HEALTH CARE: ICD-10-CM

## 2019-03-14 DIAGNOSIS — Z00.00 PREVENTATIVE HEALTH CARE: Primary | ICD-10-CM

## 2019-03-14 DIAGNOSIS — E11.9 TYPE 2 DIABETES MELLITUS WITHOUT COMPLICATION, WITHOUT LONG-TERM CURRENT USE OF INSULIN: ICD-10-CM

## 2019-03-14 DIAGNOSIS — E78.01 HYPERLIPIDEMIA TYPE II: ICD-10-CM

## 2019-03-14 LAB
ALBUMIN SERPL BCP-MCNC: 4.3 G/DL
ALP SERPL-CCNC: 71 U/L
ALT SERPL W/O P-5'-P-CCNC: 38 U/L
ANION GAP SERPL CALC-SCNC: 10 MMOL/L
AST SERPL-CCNC: 23 U/L
BASOPHILS # BLD AUTO: 0.03 K/UL
BASOPHILS NFR BLD: 0.5 %
BILIRUB SERPL-MCNC: 0.5 MG/DL
BUN SERPL-MCNC: 19 MG/DL
CALCIUM SERPL-MCNC: 9.6 MG/DL
CHLORIDE SERPL-SCNC: 105 MMOL/L
CHOLEST SERPL-MCNC: 150 MG/DL
CHOLEST/HDLC SERPL: 4.1 {RATIO}
CO2 SERPL-SCNC: 24 MMOL/L
CREAT SERPL-MCNC: 0.9 MG/DL
DIFFERENTIAL METHOD: ABNORMAL
EOSINOPHIL # BLD AUTO: 0.1 K/UL
EOSINOPHIL NFR BLD: 2.5 %
ERYTHROCYTE [DISTWIDTH] IN BLOOD BY AUTOMATED COUNT: 13.3 %
EST. GFR  (AFRICAN AMERICAN): >60 ML/MIN/1.73 M^2
EST. GFR  (NON AFRICAN AMERICAN): >60 ML/MIN/1.73 M^2
ESTIMATED AVG GLUCOSE: 140 MG/DL
GLUCOSE SERPL-MCNC: 116 MG/DL
HBA1C MFR BLD HPLC: 6.5 %
HCT VFR BLD AUTO: 47.6 %
HDLC SERPL-MCNC: 37 MG/DL
HDLC SERPL: 24.7 %
HGB BLD-MCNC: 15.9 G/DL
LDLC SERPL CALC-MCNC: 93.2 MG/DL
LYMPHOCYTES # BLD AUTO: 1.9 K/UL
LYMPHOCYTES NFR BLD: 33.5 %
MCH RBC QN AUTO: 32.1 PG
MCHC RBC AUTO-ENTMCNC: 33.4 G/DL
MCV RBC AUTO: 96 FL
MONOCYTES # BLD AUTO: 0.5 K/UL
MONOCYTES NFR BLD: 9.5 %
NEUTROPHILS # BLD AUTO: 3.1 K/UL
NEUTROPHILS NFR BLD: 53.8 %
NONHDLC SERPL-MCNC: 113 MG/DL
PLATELET # BLD AUTO: 296 K/UL
PMV BLD AUTO: 9.4 FL
POTASSIUM SERPL-SCNC: 4.2 MMOL/L
PROT SERPL-MCNC: 7.2 G/DL
RBC # BLD AUTO: 4.95 M/UL
SODIUM SERPL-SCNC: 139 MMOL/L
TRIGL SERPL-MCNC: 99 MG/DL
TSH SERPL DL<=0.005 MIU/L-ACNC: 1.12 UIU/ML
WBC # BLD AUTO: 5.68 K/UL

## 2019-03-14 PROCEDURE — 36415 COLL VENOUS BLD VENIPUNCTURE: CPT

## 2019-03-14 PROCEDURE — 99396 PR PREVENTIVE VISIT,EST,40-64: ICD-10-PCS | Mod: S$GLB,,, | Performed by: FAMILY MEDICINE

## 2019-03-14 PROCEDURE — 99999 PR PBB SHADOW E&M-EST. PATIENT-LVL III: ICD-10-PCS | Mod: PBBFAC,,, | Performed by: FAMILY MEDICINE

## 2019-03-14 PROCEDURE — 3044F PR MOST RECENT HEMOGLOBIN A1C LEVEL <7.0%: ICD-10-PCS | Mod: CPTII,S$GLB,, | Performed by: FAMILY MEDICINE

## 2019-03-14 PROCEDURE — 99999 PR PBB SHADOW E&M-EST. PATIENT-LVL III: CPT | Mod: PBBFAC,,, | Performed by: FAMILY MEDICINE

## 2019-03-14 PROCEDURE — 3044F HG A1C LEVEL LT 7.0%: CPT | Mod: CPTII,S$GLB,, | Performed by: FAMILY MEDICINE

## 2019-03-14 PROCEDURE — 99396 PREV VISIT EST AGE 40-64: CPT | Mod: S$GLB,,, | Performed by: FAMILY MEDICINE

## 2019-03-14 PROCEDURE — 84443 ASSAY THYROID STIM HORMONE: CPT

## 2019-03-14 PROCEDURE — 80061 LIPID PANEL: CPT

## 2019-03-14 PROCEDURE — 85025 COMPLETE CBC W/AUTO DIFF WBC: CPT

## 2019-03-14 PROCEDURE — 80053 COMPREHEN METABOLIC PANEL: CPT

## 2019-03-14 PROCEDURE — 83036 HEMOGLOBIN GLYCOSYLATED A1C: CPT

## 2019-03-14 NOTE — TELEPHONE ENCOUNTER
----- Message from Kam Phipps MD sent at 3/14/2019  4:39 PM CDT -----  Laboratory is normal.  No changes in medications.  Followup as scheduled.

## 2019-03-14 NOTE — PROGRESS NOTES
"CHIEF COMPLAINT: DM check up     HISTORY OF PRESENT ILLNESS: The patient is a 48 year-old white male.  She  He is currently on metformin and Crestor.     The patient has a history of diabetes.  Recent blood sugars have been less than 120.  There have been no episodes of hypoglycemia.    The patient has a history of stable hyperlipidemia on current medications.  The patient denies chest pain or shortness of breath today.  The patient denies muscle aches or myalgias suggestive of myositis.    REVIEW OF SYSTEMS:   GENERAL: No fever, chills, weight loss.   SKIN: No rashes, itching or changes in color or texture of skin.   HEAD: No headaches or recent head trauma.   EYES: Visual acuity fine. No photophobia, ocular pain or diplopia.   EARS: Denies ear pain, discharge or vertigo.   NOSE: No loss of smell, no epistaxis or postnasal drip.   MOUTH & THROAT: No hoarseness or change in voice. No excessive gum bleeding.   NODES: Denies swollen glands.   CHEST: Denies JASMINE, cyanosis, wheezing, cough and sputum production.   CARDIOVASCULAR: Denies chest pain, PND, orthopnea or reduced exercise tolerance.   ABDOMEN: Appetite fine. No weight loss. Denies diarrhea, abdominal pain, hematemesis or blood in stool.   URINARY: No flank pain, dysuria or hematuria.   PERIPHERAL VASCULAR: No claudication or cyanosis.   MUSCULOSKELETAL: No joint stiffness or swelling. Denies back pain.   NEUROLOGIC: No history of seizures, paralysis, alteration of gait or coordination.     SOCIAL HISTORY: The patient does not smoke. The patient consumes alcohol socially. The patient is single. He is an     PHYSICAL EXAMINATION:   Blood pressure 108/80, pulse 78, height 5' 10" (1.778 m), weight 93.8 kg (206 lb 12.7 oz), SpO2 98 %.    APPEARANCE: Well nourished, well developed, in no acute distress.   HEAD: Normocephalic, atraumatic.   EYES: PERRL. EOMI. Conjunctivae without injection and anicteric   EARS: TM's intact. Light reflex normal. No " retraction or perforation.   NOSE: Mucosa pink. Airway clear.   MOUTH & THROAT: No tonsillar enlargement. No pharyngeal erythema or exudate. No stridor.   NECK: Supple.   NODES: No cervical, axillary or inguinal lymph node enlargement.   CHEST: Lungs clear to auscultation. No retractions are noted. No rales or rhonchi are present.   CARDIOVASCULAR: Normal S1, S2. No rubs, murmurs or gallops.   ABDOMEN: Bowel sounds normal. Not distended. Soft. No tenderness or masses. No ascites is noted.   MUSCULOSKELETAL: There is no clubbing, cyanosis, or edema of the extremities x4. There is full range of motion of the lumbar spine. There is full range of motion of the extremities x4. There is no deformity noted.   NEUROLOGIC:   Normal speech development.   Hearing normal.   Normal gait.   Motor and sensory exams grossly normal.   DTR's normal.   PSYCHIATRIC: Patient is alert and oriented x3. Thought processes are all normal. There is no homicidality. There is no suicidality. There is no evidence of psychosis.     LABORATORY/RADIOLOGY: Chart reviewed.      ASSESSMENT:   Annual  Type 2 diabetes   Hyperlipidemia   Family history DM  Family history of heart disease    PLAN:   he is doing well.  His A1c is 6 today.  Cholesterol panel looks good.  Liver functions are normal.  Return to clinic in 6 months with blood work prior

## 2019-04-01 DIAGNOSIS — E11.9 TYPE 2 DIABETES MELLITUS: ICD-10-CM

## 2019-04-05 DIAGNOSIS — E11.9 TYPE 2 DIABETES MELLITUS WITHOUT COMPLICATION, WITHOUT LONG-TERM CURRENT USE OF INSULIN: ICD-10-CM

## 2019-04-05 DIAGNOSIS — E78.01 HYPERLIPIDEMIA TYPE II: ICD-10-CM

## 2019-04-05 DIAGNOSIS — Z00.00 PREVENTATIVE HEALTH CARE: ICD-10-CM

## 2019-04-05 RX ORDER — METFORMIN HYDROCHLORIDE 500 MG/1
TABLET ORAL
Qty: 180 TABLET | Refills: 0 | Status: SHIPPED | OUTPATIENT
Start: 2019-04-05 | End: 2019-07-01 | Stop reason: SDUPTHER

## 2019-04-05 RX ORDER — BLOOD SUGAR DIAGNOSTIC
STRIP MISCELLANEOUS
Qty: 300 STRIP | Refills: 0 | Status: SHIPPED | OUTPATIENT
Start: 2019-04-05 | End: 2019-07-26 | Stop reason: SDUPTHER

## 2019-06-27 ENCOUNTER — PATIENT MESSAGE (OUTPATIENT)
Dept: INTERNAL MEDICINE | Facility: CLINIC | Age: 49
End: 2019-06-27

## 2019-07-01 DIAGNOSIS — E78.01 HYPERLIPIDEMIA TYPE II: ICD-10-CM

## 2019-07-01 DIAGNOSIS — Z00.00 PREVENTATIVE HEALTH CARE: ICD-10-CM

## 2019-07-01 DIAGNOSIS — E11.9 TYPE 2 DIABETES MELLITUS WITHOUT COMPLICATION, WITHOUT LONG-TERM CURRENT USE OF INSULIN: ICD-10-CM

## 2019-07-01 RX ORDER — METFORMIN HYDROCHLORIDE 500 MG/1
TABLET ORAL
Qty: 180 TABLET | Refills: 3 | Status: SHIPPED | OUTPATIENT
Start: 2019-07-01 | End: 2020-07-27

## 2019-07-26 DIAGNOSIS — E78.01 HYPERLIPIDEMIA TYPE II: ICD-10-CM

## 2019-07-26 DIAGNOSIS — E11.9 TYPE 2 DIABETES MELLITUS WITHOUT COMPLICATION, WITHOUT LONG-TERM CURRENT USE OF INSULIN: ICD-10-CM

## 2019-07-26 DIAGNOSIS — Z00.00 PREVENTATIVE HEALTH CARE: ICD-10-CM

## 2019-07-26 RX ORDER — BLOOD SUGAR DIAGNOSTIC
STRIP MISCELLANEOUS
Qty: 300 STRIP | Refills: 0 | Status: SHIPPED | OUTPATIENT
Start: 2019-07-26 | End: 2019-10-24 | Stop reason: SDUPTHER

## 2019-07-26 RX ORDER — ROSUVASTATIN CALCIUM 5 MG/1
TABLET, COATED ORAL
Qty: 90 TABLET | Refills: 0 | Status: SHIPPED | OUTPATIENT
Start: 2019-07-26 | End: 2019-10-24 | Stop reason: SDUPTHER

## 2019-08-04 ENCOUNTER — PATIENT MESSAGE (OUTPATIENT)
Dept: PAIN MEDICINE | Facility: CLINIC | Age: 49
End: 2019-08-04

## 2019-08-05 ENCOUNTER — PATIENT MESSAGE (OUTPATIENT)
Dept: PAIN MEDICINE | Facility: CLINIC | Age: 49
End: 2019-08-05

## 2019-08-06 ENCOUNTER — PATIENT MESSAGE (OUTPATIENT)
Dept: PAIN MEDICINE | Facility: CLINIC | Age: 49
End: 2019-08-06

## 2019-08-08 ENCOUNTER — OFFICE VISIT (OUTPATIENT)
Dept: PAIN MEDICINE | Facility: CLINIC | Age: 49
End: 2019-08-08
Attending: ANESTHESIOLOGY
Payer: COMMERCIAL

## 2019-08-08 VITALS
SYSTOLIC BLOOD PRESSURE: 141 MMHG | HEART RATE: 90 BPM | DIASTOLIC BLOOD PRESSURE: 98 MMHG | HEIGHT: 70 IN | BODY MASS INDEX: 30.21 KG/M2 | TEMPERATURE: 99 F | WEIGHT: 211 LBS | RESPIRATION RATE: 18 BRPM

## 2019-08-08 DIAGNOSIS — M54.12 LEFT CERVICAL RADICULOPATHY: ICD-10-CM

## 2019-08-08 DIAGNOSIS — G89.4 CHRONIC PAIN DISORDER: Primary | ICD-10-CM

## 2019-08-08 DIAGNOSIS — M54.12 CERVICAL RADICULITIS: ICD-10-CM

## 2019-08-08 PROCEDURE — 99999 PR PBB SHADOW E&M-EST. PATIENT-LVL IV: CPT | Mod: PBBFAC,,, | Performed by: ANESTHESIOLOGY

## 2019-08-08 PROCEDURE — 99999 PR PBB SHADOW E&M-EST. PATIENT-LVL IV: ICD-10-PCS | Mod: PBBFAC,,, | Performed by: ANESTHESIOLOGY

## 2019-08-08 PROCEDURE — 3008F PR BODY MASS INDEX (BMI) DOCUMENTED: ICD-10-PCS | Mod: CPTII,S$GLB,, | Performed by: ANESTHESIOLOGY

## 2019-08-08 PROCEDURE — 3008F BODY MASS INDEX DOCD: CPT | Mod: CPTII,S$GLB,, | Performed by: ANESTHESIOLOGY

## 2019-08-08 PROCEDURE — 99214 PR OFFICE/OUTPT VISIT, EST, LEVL IV, 30-39 MIN: ICD-10-PCS | Mod: S$GLB,,, | Performed by: ANESTHESIOLOGY

## 2019-08-08 PROCEDURE — 99214 OFFICE O/P EST MOD 30 MIN: CPT | Mod: S$GLB,,, | Performed by: ANESTHESIOLOGY

## 2019-08-08 RX ORDER — METHYLPREDNISOLONE 4 MG/1
TABLET ORAL
Qty: 1 PACKAGE | Refills: 0 | Status: SHIPPED | OUTPATIENT
Start: 2019-08-08 | End: 2019-08-14

## 2019-08-08 RX ORDER — ETODOLAC 400 MG/1
400 TABLET, FILM COATED ORAL 2 TIMES DAILY PRN
Qty: 30 TABLET | Refills: 3 | Status: SHIPPED | OUTPATIENT
Start: 2019-08-08 | End: 2019-09-27

## 2019-08-08 NOTE — PATIENT INSTRUCTIONS
Recommend starting etodolac 400 mg up to twice daily as needed for pain.  Stay at most comfortable dose.  Take medication with meals.  If you experience any upset stomach, nausea, or vomiting, stop taking this medication. Do not take this with any other medications in the NSAID family, such as ibuprofen, aspirin, and Naproxen.  Start this after the dose pack.

## 2019-08-08 NOTE — PROGRESS NOTES
Subjective:     Patient ID: Osmel Nair is a 48 y.o. male.    Chief Complaint: Pain    Consulted by: Self    Disclaimer: This note was generated using voice recognition software.  There may be typographical errors that were missed during proofreading.       Previous HPI interval history from visit with Dr. Lee, who is no longer with [facility]           SUBJECTIVE:     Osmel Nair is a 48 y.o. male with hx of T2DM who presents to the clinic for the evaluation of left shoulder pain. The pain started 5 weeks ago and symptoms have been persistent. No trauma or inciting event. The pain is located in the left upper trapezius area and radiates down the arm into the thumb and index finger. The pain initially involved the neck although this has resolved.  The arm and hand pain is described as numb and tingling. The shoulder pain is described as sharp and shooting and is rated as 2/10. The pain is rated with a score of  2/10 on the BEST day and a score of 8/10 on the WORST day.  Symptoms interfere with daily activity, sleeping and work. The pain is exacerbated by sleeping, lots of physical exertion, and manual labor.  The pain is mitigated by rest and medication. He reports spending 0 hours per day reclining. The patient reports 6-8 hours of uninterrupted sleep per night.     Patient denies urinary incontinence, bowel incontinence, significant weight loss, significant motor weakness and loss of sensations.    INTERVAL HISTORY (10/22/2018):     Osmel Nair presents to the clinic today for a follow-up appointment for neck pain. The patient reports 100% pain relief after cervical MARIA LUZ performed on 10/30/2017 which lasted 11 months. He reports the pain in his neck is gradually starting to return. The pain started one week ago. No injury or inciting event.The pain is located in the left cervical paraspinal area and radiates into the left shoulder. Current pain intensity is 2/10.  He describes it as sharp  sensation. The pain is exacerbated by cycling and head turning. Aleve provides mild-moderate relief.     Patient denies urinary incontinence, bowel incontinence, significant weight loss, significant motor weakness and loss of sensations.    Interval History (8/8/2019):  He returns today for follow up.  He reports that the Medrol Dosepak given at his most recent visit in 10/2018 provided very good relief for ~7.5 months, then similar pain as before returned. Pain starts in his left neck with consistent radiation to the left trap and shoulder region, occasionally down to the elbow, and also with occasional paresthesias in the left index and thumb. Denies weakness. Denies bowel or bladder incontinence. Cervical MARIA LUZ x1, oral steroids and NSAIDs, and yoga has been helpful for the pain.    Aggravating factors: standing for long periods, sitting at a desk for long periods    Mitigating factors: medications, raising arm above head improves pain    Previously seeing: Dr Mata    Physical Therapy: attended 3-4 visits in 2017 with partial relief, equivalent to yoga    Non-pharmacologic Treatment:     · Ice/Heat: Heat partially effective  · TENS: hasn't tried  · Massage: hasnt tried  · Chiropractic care: hasnt tried  · Acupuncture: hasnt tried  · Other: Yoga is helpful, attends at least weekly         Pain Medications:         · Currently taking: Naproxen 220mg occasionally     · Has tried in the past:    · Opioids: hasnt tried  · NSAIDS: Naproxen minimal relief  · Tylenol: minimal relief  · Muscle relaxants: hasnt tried  · TCAs: hasnt tried  · SNRIs: hasnt tried  · Anticonvulsants: Gabapentin 300 BID, not helpful, experienced paresthesias in both hands stopped  · topical creams: icy hot provided temporary relief  · Other: none    Blood thinners: no    Interventional Therapies:   · 10/30/2017:  C7/T1 MARIA LUZ:  100% pain relief for 11 months    Relevant Surgeries: none    Affecting sleep? yes    Affecting daily activities?  yes    Depressive symptoms? denies          · SI/HI? No    Work status: works as  here in [place]    Prescription Monitoring Program database:  Reviewed and consistent with medication use as prescribed. (No record or any controlled substance RXs)    Last 3 PDI Scores 8/8/2019 10/22/2018 10/6/2017   Pain Disability Index (PDI) 43 17 25       Opioid Risk Score     None          GENERAL:  No weight loss, malaise or fevers.  HEENT:   No recent changes in vision or hearing  NECK:  Negative for lumps, no difficulty with swallowing.  RESPIRATORY:  Negative for cough, wheezing or shortness of breath, patient denies any recent URI.  CARDIOVASCULAR:  Negative for chest pain, leg swelling or palpitations.  GI:  Negative for abdominal discomfort, blood in stools or black stools or change in bowel habits.  MUSCULOSKELETAL:  See HPI.  SKIN:  Negative for lesions, rash, and itching.  PSYCH:  Negative for mood disorder or recent psychosocial stressors.    HEMATOLOGY/LYMPHOLOGY:  Negative for prolonged bleeding, bruising easily or swollen nodes.    ENDO: +diabetes, denies thyroid dysfunction  NEURO:   + history of stage I lyme disease treated with antibiotics. No history of headaches, syncope, paralysis, seizures or tremors.  All other reviewed and negative other than HPI.          Past Medical History:   Diagnosis Date    Diabetes mellitus, type 2 07/2016    Lyme disease 2016       Past Surgical History:   Procedure Laterality Date    KNEE ARTHROSCOPY Right 1989       Review of patient's allergies indicates:  No Known Allergies    Current Outpatient Medications   Medication Sig Dispense Refill    CONTOUR NEXT TEST STRIPS Strp TEST THREE TIMES DAILY 300 strip 0    metFORMIN (GLUCOPHAGE) 500 MG tablet TAKE 1 TABLET(500 MG) BY MOUTH TWICE DAILY WITH MEALS 180 tablet 3    rosuvastatin (CRESTOR) 5 MG tablet TAKE 1 TABLET(5 MG) BY MOUTH EVERY DAY 90 tablet 0    etodolac (LODINE) 400 MG tablet Take 1 tablet (400 mg  "total) by mouth 2 (two) times daily as needed (pain). 30 tablet 3    methylPREDNISolone (MEDROL DOSEPACK) 4 mg tablet use as directed 1 Package 0     No current facility-administered medications for this visit.        Family History   Problem Relation Age of Onset    Diabetes Mother     Diabetes Father     Diabetes Sister        Social History     Socioeconomic History    Marital status: Single     Spouse name: Not on file    Number of children: Not on file    Years of education: Not on file    Highest education level: Not on file   Occupational History    Not on file   Social Needs    Financial resource strain: Not on file    Food insecurity:     Worry: Not on file     Inability: Not on file    Transportation needs:     Medical: Not on file     Non-medical: Not on file   Tobacco Use    Smoking status: Current Every Day Smoker    Smokeless tobacco: Never Used   Substance and Sexual Activity    Alcohol use: Yes    Drug use: Not on file    Sexual activity: Not on file   Lifestyle    Physical activity:     Days per week: Not on file     Minutes per session: Not on file    Stress: Not on file   Relationships    Social connections:     Talks on phone: Not on file     Gets together: Not on file     Attends Nondenominational service: Not on file     Active member of club or organization: Not on file     Attends meetings of clubs or organizations: Not on file     Relationship status: Not on file   Other Topics Concern    Not on file   Social History Narrative    Not on file       Objective:     Vitals:    08/08/19 0716   BP: (!) 141/98   Pulse: 90   Resp: 18   Temp: 98.8 °F (37.1 °C)   Weight: 95.7 kg (210 lb 15.7 oz)   Height: 5' 10" (1.778 m)   PainSc:   5   PainLoc: Neck       GEN:  Well developed, well nourished.  No acute distress.  No pain behavior.  HEENT:  No trauma.  Mucous membranes moist.  Nares patent bilaterally.  PSYCH: Normal affect. Thought content appropriate.  CHEST:  Breathing symmetric.  No " audible wheezing.  ABD: Soft, non-distended.  SKIN:  Warm, pink, dry.  No rash on exposed areas.    EXT:  No cyanosis, clubbing, or edema.  No color change or changes in nail or hair growth.  neg Tinel's over right carpal tunnel. neg Tinel's over right cubital tunnel.  neg Tinel's over left carpal tunnel. neg Tinel's over left cubital tunnel.  5/5 motor strength throughout upper extremities.   Sensory: no sensory deficit in the upper extremities.  NEURO/MUSCULOSKELETAL:  Fully alert, oriented, and appropriate. Speech normal alejandro. No cranial nerve deficits.   Gait: normal gait, no AD.     Reflexes: 2+ and symmetric throughout.  Absent Holly's bilaterally.  C-Spine:  good ROM all planes but with pain on flexion, extension, and rotation to both sides. Left sided only neck pain with axial/facet loading bilaterally. Spurling's on left produces radiation of pain into shoulder and lateral proximal right arm.  No TTP over cervical facet joints, cervical paraspinal muscles    Left shoulder with no tenderness about the shoulder joint. No pain with abduction to 170 degrees. Negative Neer's/Hawkin's/empty can test.        Imaging:        The imaging studies listed below were independently reviewed by me, and I agree with the findings as documented below.     MRI C-spine 10/6/2017:  Findings:    There is straightening of the normal cervical lordosis. The vertebral body heights and alignment are within normal limits. Intervertebral disk spaces are well preserved. Bone marrow signal is normal.    Visualized posterior fossa structures and cervical cord are unremarkable.    Limited evaluation of the neck soft tissues is unremarkable.    C2-3: No spinal canal stenosis or neuroforaminal narrowing.    C3-4: Posterior disc osteophyte complex formation and uncovertebral arthrosis resulting in mild bilateral neuroforaminal narrowing and no spinal canal stenosis.    C4-5: Posterior disc osteophyte complex formation and uncovertebral  arthrosis resulting in mild bilateral neuroforaminal narrowing and no spinal canal stenosis.    C5-6: Posterior disc osteophyte complex formation with uncovertebral arthrosis resulting in moderate left and mild right neuroforaminal narrowing. No spinal canal stenosis    C6-7: Posterior disc osteophyte complex formation without spinal canal stenosis or neuroforaminal narrowing.      Impression       Mild cervical spondylosis with varying degrees of mild to moderate neuroforaminal narrowing, as described above.       Assessment:     Encounter Diagnoses   Name Primary?    Chronic pain disorder Yes    Cervical radiculitis     Left cervical radiculopathy        Plan:     Osmel was seen today for neck pain.    Diagnoses and all orders for this visit:    Chronic pain disorder  -     methylPREDNISolone (MEDROL DOSEPACK) 4 mg tablet; use as directed  -     etodolac (LODINE) 400 MG tablet; Take 1 tablet (400 mg total) by mouth 2 (two) times daily as needed (pain).    Cervical radiculitis  -     methylPREDNISolone (MEDROL DOSEPACK) 4 mg tablet; use as directed  -     etodolac (LODINE) 400 MG tablet; Take 1 tablet (400 mg total) by mouth 2 (two) times daily as needed (pain).    Left cervical radiculopathy  -     methylPREDNISolone (MEDROL DOSEPACK) 4 mg tablet; use as directed  -     etodolac (LODINE) 400 MG tablet; Take 1 tablet (400 mg total) by mouth 2 (two) times daily as needed (pain).         His neck pain is consistent with the above.    We discussed the assessment and recommendations.  All available images were reviewed. We discussed the disease process, prognosis, treatment plan, and risks and benefits. The patient is aware of the risks and benefits of the medications being prescribed, common side effects, and proper usage. The following is the plan we agreed on:     1. Patient has gotten excellent relief of pain with medrol dose pack, and he would like to try this again. RX provided.  2. We will also trial  prescription strength antiinflammatory once he completes medrol dose pack, Lodine 400mg BID PRN. RX for 2 weeks provided, can be continued if effective. Discontinue aleve.  3. If suboptimal improvement with above, we will schedule him for repeat C7/T1 MARIA LUZ, as this has also provided good relief of pain in the past. Patient will call us if he requires this procedure.   4. Continue yoga, which is excellent for muscle strengthening, ROM, and pain control.    5. RTC PRN or for cervical MARIA LUZ as above.     JOCY Metcalf MD  [facility]    Thank you for allowing me to participate in the care of this patient.   Please do not hesitate to call me at [phone] with any questions or concerns.    Uzma Ulloa MD  08/08/2019     The above plan and management options were discussed at length with patient. Patient is in agreement with the above and verbalized understanding. It will be communicated with the referring physician via electronic record, fax, or mail.

## 2019-08-08 NOTE — H&P (VIEW-ONLY)
Subjective:     Patient ID: Osmel Niar is a 48 y.o. male.    Chief Complaint: Pain    Consulted by: Self    Disclaimer: This note was generated using voice recognition software.  There may be typographical errors that were missed during proofreading.       Previous HPI interval history from visit with Dr. Lee, who is no longer with [facility]           SUBJECTIVE:     Osmel Nair is a 48 y.o. male with hx of T2DM who presents to the clinic for the evaluation of left shoulder pain. The pain started 5 weeks ago and symptoms have been persistent. No trauma or inciting event. The pain is located in the left upper trapezius area and radiates down the arm into the thumb and index finger. The pain initially involved the neck although this has resolved.  The arm and hand pain is described as numb and tingling. The shoulder pain is described as sharp and shooting and is rated as 2/10. The pain is rated with a score of  2/10 on the BEST day and a score of 8/10 on the WORST day.  Symptoms interfere with daily activity, sleeping and work. The pain is exacerbated by sleeping, lots of physical exertion, and manual labor.  The pain is mitigated by rest and medication. He reports spending 0 hours per day reclining. The patient reports 6-8 hours of uninterrupted sleep per night.     Patient denies urinary incontinence, bowel incontinence, significant weight loss, significant motor weakness and loss of sensations.    INTERVAL HISTORY (10/22/2018):     Osmel Nair presents to the clinic today for a follow-up appointment for neck pain. The patient reports 100% pain relief after cervical MARIA LUZ performed on 10/30/2017 which lasted 11 months. He reports the pain in his neck is gradually starting to return. The pain started one week ago. No injury or inciting event.The pain is located in the left cervical paraspinal area and radiates into the left shoulder. Current pain intensity is 2/10.  He describes it as sharp  sensation. The pain is exacerbated by cycling and head turning. Aleve provides mild-moderate relief.     Patient denies urinary incontinence, bowel incontinence, significant weight loss, significant motor weakness and loss of sensations.    Interval History (8/8/2019):  He returns today for follow up.  He reports that the Medrol Dosepak given at his most recent visit in 10/2018 provided very good relief for ~7.5 months, then similar pain as before returned. Pain starts in his left neck with consistent radiation to the left trap and shoulder region, occasionally down to the elbow, and also with occasional paresthesias in the left index and thumb. Denies weakness. Denies bowel or bladder incontinence. Cervical MARIA LUZ x1, oral steroids and NSAIDs, and yoga has been helpful for the pain.    Aggravating factors: standing for long periods, sitting at a desk for long periods    Mitigating factors: medications, raising arm above head improves pain    Previously seeing: Dr Mata    Physical Therapy: attended 3-4 visits in 2017 with partial relief, equivalent to yoga    Non-pharmacologic Treatment:     · Ice/Heat: Heat partially effective  · TENS: hasn't tried  · Massage: hasnt tried  · Chiropractic care: hasnt tried  · Acupuncture: hasnt tried  · Other: Yoga is helpful, attends at least weekly         Pain Medications:         · Currently taking: Naproxen 220mg occasionally     · Has tried in the past:    · Opioids: hasnt tried  · NSAIDS: Naproxen minimal relief  · Tylenol: minimal relief  · Muscle relaxants: hasnt tried  · TCAs: hasnt tried  · SNRIs: hasnt tried  · Anticonvulsants: Gabapentin 300 BID, not helpful, experienced paresthesias in both hands stopped  · topical creams: icy hot provided temporary relief  · Other: none    Blood thinners: no    Interventional Therapies:   · 10/30/2017:  C7/T1 MARIA LUZ:  100% pain relief for 11 months    Relevant Surgeries: none    Affecting sleep? yes    Affecting daily activities?  yes    Depressive symptoms? denies          · SI/HI? No    Work status: works as  here in [place]    Prescription Monitoring Program database:  Reviewed and consistent with medication use as prescribed. (No record or any controlled substance RXs)    Last 3 PDI Scores 8/8/2019 10/22/2018 10/6/2017   Pain Disability Index (PDI) 43 17 25       Opioid Risk Score     None          GENERAL:  No weight loss, malaise or fevers.  HEENT:   No recent changes in vision or hearing  NECK:  Negative for lumps, no difficulty with swallowing.  RESPIRATORY:  Negative for cough, wheezing or shortness of breath, patient denies any recent URI.  CARDIOVASCULAR:  Negative for chest pain, leg swelling or palpitations.  GI:  Negative for abdominal discomfort, blood in stools or black stools or change in bowel habits.  MUSCULOSKELETAL:  See HPI.  SKIN:  Negative for lesions, rash, and itching.  PSYCH:  Negative for mood disorder or recent psychosocial stressors.    HEMATOLOGY/LYMPHOLOGY:  Negative for prolonged bleeding, bruising easily or swollen nodes.    ENDO: +diabetes, denies thyroid dysfunction  NEURO:   + history of stage I lyme disease treated with antibiotics. No history of headaches, syncope, paralysis, seizures or tremors.  All other reviewed and negative other than HPI.          Past Medical History:   Diagnosis Date    Diabetes mellitus, type 2 07/2016    Lyme disease 2016       Past Surgical History:   Procedure Laterality Date    KNEE ARTHROSCOPY Right 1989       Review of patient's allergies indicates:  No Known Allergies    Current Outpatient Medications   Medication Sig Dispense Refill    CONTOUR NEXT TEST STRIPS Strp TEST THREE TIMES DAILY 300 strip 0    metFORMIN (GLUCOPHAGE) 500 MG tablet TAKE 1 TABLET(500 MG) BY MOUTH TWICE DAILY WITH MEALS 180 tablet 3    rosuvastatin (CRESTOR) 5 MG tablet TAKE 1 TABLET(5 MG) BY MOUTH EVERY DAY 90 tablet 0    etodolac (LODINE) 400 MG tablet Take 1 tablet (400 mg  "total) by mouth 2 (two) times daily as needed (pain). 30 tablet 3    methylPREDNISolone (MEDROL DOSEPACK) 4 mg tablet use as directed 1 Package 0     No current facility-administered medications for this visit.        Family History   Problem Relation Age of Onset    Diabetes Mother     Diabetes Father     Diabetes Sister        Social History     Socioeconomic History    Marital status: Single     Spouse name: Not on file    Number of children: Not on file    Years of education: Not on file    Highest education level: Not on file   Occupational History    Not on file   Social Needs    Financial resource strain: Not on file    Food insecurity:     Worry: Not on file     Inability: Not on file    Transportation needs:     Medical: Not on file     Non-medical: Not on file   Tobacco Use    Smoking status: Current Every Day Smoker    Smokeless tobacco: Never Used   Substance and Sexual Activity    Alcohol use: Yes    Drug use: Not on file    Sexual activity: Not on file   Lifestyle    Physical activity:     Days per week: Not on file     Minutes per session: Not on file    Stress: Not on file   Relationships    Social connections:     Talks on phone: Not on file     Gets together: Not on file     Attends Sikhism service: Not on file     Active member of club or organization: Not on file     Attends meetings of clubs or organizations: Not on file     Relationship status: Not on file   Other Topics Concern    Not on file   Social History Narrative    Not on file       Objective:     Vitals:    08/08/19 0716   BP: (!) 141/98   Pulse: 90   Resp: 18   Temp: 98.8 °F (37.1 °C)   Weight: 95.7 kg (210 lb 15.7 oz)   Height: 5' 10" (1.778 m)   PainSc:   5   PainLoc: Neck       GEN:  Well developed, well nourished.  No acute distress.  No pain behavior.  HEENT:  No trauma.  Mucous membranes moist.  Nares patent bilaterally.  PSYCH: Normal affect. Thought content appropriate.  CHEST:  Breathing symmetric.  No " audible wheezing.  ABD: Soft, non-distended.  SKIN:  Warm, pink, dry.  No rash on exposed areas.    EXT:  No cyanosis, clubbing, or edema.  No color change or changes in nail or hair growth.  neg Tinel's over right carpal tunnel. neg Tinel's over right cubital tunnel.  neg Tinel's over left carpal tunnel. neg Tinel's over left cubital tunnel.  5/5 motor strength throughout upper extremities.   Sensory: no sensory deficit in the upper extremities.  NEURO/MUSCULOSKELETAL:  Fully alert, oriented, and appropriate. Speech normal alejandro. No cranial nerve deficits.   Gait: normal gait, no AD.     Reflexes: 2+ and symmetric throughout.  Absent Holly's bilaterally.  C-Spine:  good ROM all planes but with pain on flexion, extension, and rotation to both sides. Left sided only neck pain with axial/facet loading bilaterally. Spurling's on left produces radiation of pain into shoulder and lateral proximal right arm.  No TTP over cervical facet joints, cervical paraspinal muscles    Left shoulder with no tenderness about the shoulder joint. No pain with abduction to 170 degrees. Negative Neer's/Hawkin's/empty can test.        Imaging:        The imaging studies listed below were independently reviewed by me, and I agree with the findings as documented below.     MRI C-spine 10/6/2017:  Findings:    There is straightening of the normal cervical lordosis. The vertebral body heights and alignment are within normal limits. Intervertebral disk spaces are well preserved. Bone marrow signal is normal.    Visualized posterior fossa structures and cervical cord are unremarkable.    Limited evaluation of the neck soft tissues is unremarkable.    C2-3: No spinal canal stenosis or neuroforaminal narrowing.    C3-4: Posterior disc osteophyte complex formation and uncovertebral arthrosis resulting in mild bilateral neuroforaminal narrowing and no spinal canal stenosis.    C4-5: Posterior disc osteophyte complex formation and uncovertebral  arthrosis resulting in mild bilateral neuroforaminal narrowing and no spinal canal stenosis.    C5-6: Posterior disc osteophyte complex formation with uncovertebral arthrosis resulting in moderate left and mild right neuroforaminal narrowing. No spinal canal stenosis    C6-7: Posterior disc osteophyte complex formation without spinal canal stenosis or neuroforaminal narrowing.      Impression       Mild cervical spondylosis with varying degrees of mild to moderate neuroforaminal narrowing, as described above.       Assessment:     Encounter Diagnoses   Name Primary?    Chronic pain disorder Yes    Cervical radiculitis     Left cervical radiculopathy        Plan:     Osmel was seen today for neck pain.    Diagnoses and all orders for this visit:    Chronic pain disorder  -     methylPREDNISolone (MEDROL DOSEPACK) 4 mg tablet; use as directed  -     etodolac (LODINE) 400 MG tablet; Take 1 tablet (400 mg total) by mouth 2 (two) times daily as needed (pain).    Cervical radiculitis  -     methylPREDNISolone (MEDROL DOSEPACK) 4 mg tablet; use as directed  -     etodolac (LODINE) 400 MG tablet; Take 1 tablet (400 mg total) by mouth 2 (two) times daily as needed (pain).    Left cervical radiculopathy  -     methylPREDNISolone (MEDROL DOSEPACK) 4 mg tablet; use as directed  -     etodolac (LODINE) 400 MG tablet; Take 1 tablet (400 mg total) by mouth 2 (two) times daily as needed (pain).         His neck pain is consistent with the above.    We discussed the assessment and recommendations.  All available images were reviewed. We discussed the disease process, prognosis, treatment plan, and risks and benefits. The patient is aware of the risks and benefits of the medications being prescribed, common side effects, and proper usage. The following is the plan we agreed on:     1. Patient has gotten excellent relief of pain with medrol dose pack, and he would like to try this again. RX provided.  2. We will also trial  prescription strength antiinflammatory once he completes medrol dose pack, Lodine 400mg BID PRN. RX for 2 weeks provided, can be continued if effective. Discontinue aleve.  3. If suboptimal improvement with above, we will schedule him for repeat C7/T1 MARIA LUZ, as this has also provided good relief of pain in the past. Patient will call us if he requires this procedure.   4. Continue yoga, which is excellent for muscle strengthening, ROM, and pain control.    5. RTC PRN or for cervical MARIA LUZ as above.     JOCY Metcalf MD  [facility]    Thank you for allowing me to participate in the care of this patient.   Please do not hesitate to call me at [phone] with any questions or concerns.    Uzma Ulloa MD  08/08/2019     The above plan and management options were discussed at length with patient. Patient is in agreement with the above and verbalized understanding. It will be communicated with the referring physician via electronic record, fax, or mail.

## 2019-08-09 ENCOUNTER — PATIENT MESSAGE (OUTPATIENT)
Dept: INTERNAL MEDICINE | Facility: CLINIC | Age: 49
End: 2019-08-09

## 2019-08-13 ENCOUNTER — PATIENT OUTREACH (OUTPATIENT)
Dept: ADMINISTRATIVE | Facility: HOSPITAL | Age: 49
End: 2019-08-13

## 2019-08-13 ENCOUNTER — PATIENT MESSAGE (OUTPATIENT)
Dept: PAIN MEDICINE | Facility: CLINIC | Age: 49
End: 2019-08-13

## 2019-08-13 ENCOUNTER — TELEPHONE (OUTPATIENT)
Dept: PAIN MEDICINE | Facility: CLINIC | Age: 49
End: 2019-08-13

## 2019-08-13 NOTE — TELEPHONE ENCOUNTER
Uzma Ulloa MD routed conversation to You 23 minutes ago (11:03 AM)      Uzma Ulloa MD 23 minutes ago (11:02 AM)         Please schedule for C7/T1 interlaminar injection at his convenience.  Three-week follow-up with myself, Eboni, or Leola         Documentation       You  Uzma Ulloa MD 1 hour ago (9:34 AM)       Does he need an appointment to schedule procedure?    Routing comment       Osmel Nair, Uzma BYRD MD 1 hour ago (9:28 AM)         Good morning,     I've finished the steroid prescription you'd written for me to alleviate the pain associated with the arthritis in my neck. It appears that I may need to schedule an appointment to get the steroid shots, like I did in October 2017, as the pain has not decreased. Any chance that could be scheduled sometime after August 25th?     Thanks, Osmel Nair

## 2019-08-13 NOTE — TELEPHONE ENCOUNTER
Please schedule for C7/T1 interlaminar injection at his convenience.  Three-week follow-up with myself, Eboni, or Leola

## 2019-08-14 ENCOUNTER — OFFICE VISIT (OUTPATIENT)
Dept: INTERNAL MEDICINE | Facility: CLINIC | Age: 49
End: 2019-08-14
Attending: FAMILY MEDICINE
Payer: COMMERCIAL

## 2019-08-14 ENCOUNTER — IMMUNIZATION (OUTPATIENT)
Dept: PHARMACY | Facility: CLINIC | Age: 49
End: 2019-08-14
Payer: COMMERCIAL

## 2019-08-14 VITALS
OXYGEN SATURATION: 97 % | HEIGHT: 70 IN | WEIGHT: 205.25 LBS | DIASTOLIC BLOOD PRESSURE: 80 MMHG | BODY MASS INDEX: 29.38 KG/M2 | SYSTOLIC BLOOD PRESSURE: 124 MMHG | HEART RATE: 101 BPM

## 2019-08-14 DIAGNOSIS — E78.01 HYPERLIPIDEMIA TYPE II: ICD-10-CM

## 2019-08-14 DIAGNOSIS — M54.12 CERVICAL RADICULOPATHY: ICD-10-CM

## 2019-08-14 DIAGNOSIS — E11.9 TYPE 2 DIABETES MELLITUS WITHOUT COMPLICATION, WITHOUT LONG-TERM CURRENT USE OF INSULIN: Primary | ICD-10-CM

## 2019-08-14 PROCEDURE — 3008F PR BODY MASS INDEX (BMI) DOCUMENTED: ICD-10-PCS | Mod: CPTII,S$GLB,, | Performed by: FAMILY MEDICINE

## 2019-08-14 PROCEDURE — 3008F BODY MASS INDEX DOCD: CPT | Mod: CPTII,S$GLB,, | Performed by: FAMILY MEDICINE

## 2019-08-14 PROCEDURE — 99999 PR PBB SHADOW E&M-EST. PATIENT-LVL III: ICD-10-PCS | Mod: PBBFAC,,, | Performed by: FAMILY MEDICINE

## 2019-08-14 PROCEDURE — 3044F PR MOST RECENT HEMOGLOBIN A1C LEVEL <7.0%: ICD-10-PCS | Mod: CPTII,S$GLB,, | Performed by: FAMILY MEDICINE

## 2019-08-14 PROCEDURE — 99999 PR PBB SHADOW E&M-EST. PATIENT-LVL III: CPT | Mod: PBBFAC,,, | Performed by: FAMILY MEDICINE

## 2019-08-14 PROCEDURE — 99214 PR OFFICE/OUTPT VISIT, EST, LEVL IV, 30-39 MIN: ICD-10-PCS | Mod: S$GLB,,, | Performed by: FAMILY MEDICINE

## 2019-08-14 PROCEDURE — 99214 OFFICE O/P EST MOD 30 MIN: CPT | Mod: S$GLB,,, | Performed by: FAMILY MEDICINE

## 2019-08-14 PROCEDURE — 3044F HG A1C LEVEL LT 7.0%: CPT | Mod: CPTII,S$GLB,, | Performed by: FAMILY MEDICINE

## 2019-08-14 NOTE — PROGRESS NOTES
"CHIEF COMPLAINT: DM check up     HISTORY OF PRESENT ILLNESS: The patient is a 48 year-old white male.  She  He is currently on metformin and Crestor only.     The patient has a history of diabetes.  Recent blood sugars have been less than 120.  There have been no episodes of hypoglycemia.    The patient has a history of stable hyperlipidemia on current medications.  The patient denies chest pain or shortness of breath today.  The patient denies muscle aches or myalgias suggestive of myositis.    He is following with the Pain Clinic for a cervical radiculopathy that responds well to MARIA LUZ.    REVIEW OF SYSTEMS:   GENERAL: No fever, chills, weight loss.   SKIN: No rashes, itching or changes in color or texture of skin.   HEAD: No headaches or recent head trauma.   EYES: Visual acuity fine. No photophobia, ocular pain or diplopia.   EARS: Denies ear pain, discharge or vertigo.   NOSE: No loss of smell, no epistaxis or postnasal drip.   MOUTH & THROAT: No hoarseness or change in voice. No excessive gum bleeding.   NODES: Denies swollen glands.   CHEST: Denies JASMINE, cyanosis, wheezing, cough and sputum production.   CARDIOVASCULAR: Denies chest pain, PND, orthopnea or reduced exercise tolerance.   ABDOMEN: Appetite fine. No weight loss. Denies diarrhea, abdominal pain, hematemesis or blood in stool.   URINARY: No flank pain, dysuria or hematuria.   PERIPHERAL VASCULAR: No claudication or cyanosis.   MUSCULOSKELETAL: No joint stiffness or swelling. Denies back pain.   NEUROLOGIC: No history of seizures, paralysis, alteration of gait or coordination.     SOCIAL HISTORY: The patient does not smoke. The patient consumes alcohol socially. The patient is single. He is an     PHYSICAL EXAMINATION:   Blood pressure 124/80, pulse 101, height 5' 10" (1.778 m), weight 93.1 kg (205 lb 4 oz), SpO2 97 %.    APPEARANCE: Well nourished, well developed, in no acute distress.   HEAD: Normocephalic, atraumatic.   EYES: PERRL. " EOMI. Conjunctivae without injection and anicteric   EARS: TM's intact. Light reflex normal. No retraction or perforation.   NOSE: Mucosa pink. Airway clear.   MOUTH & THROAT: No tonsillar enlargement. No pharyngeal erythema or exudate. No stridor.   NECK: Supple.   NODES: No cervical, axillary or inguinal lymph node enlargement.   CHEST: Lungs clear to auscultation. No retractions are noted. No rales or rhonchi are present.   CARDIOVASCULAR: Normal S1, S2. No rubs, murmurs or gallops.   ABDOMEN: Bowel sounds normal. Not distended. Soft. No tenderness or masses. No ascites is noted.   MUSCULOSKELETAL: There is no clubbing, cyanosis, or edema of the extremities x4. There is full range of motion of the lumbar spine. There is full range of motion of the extremities x4. There is no deformity noted.   NEUROLOGIC:   Normal speech development.   Hearing normal.   Normal gait.   Motor and sensory exams grossly normal.   PSYCHIATRIC: Patient is alert and oriented x3. Thought processes are all normal. There is no homicidality. There is no suicidality. There is no evidence of psychosis.     LABORATORY/RADIOLOGY: Chart reviewed.      ASSESSMENT:   Type 2 diabetes   Hyperlipidemia   Family history DM  Family history of heart disease  Cervical radiculopathy    PLAN:   He is due for an A1c in a couple of months.  Will do phone review.  Follow up with Pain Clinic for epidural steroid injection     Return to clinic in 6 months with complete blood work prior       yes

## 2019-08-15 ENCOUNTER — PATIENT OUTREACH (OUTPATIENT)
Dept: ADMINISTRATIVE | Facility: HOSPITAL | Age: 49
End: 2019-08-15

## 2019-08-16 ENCOUNTER — IMMUNIZATION (OUTPATIENT)
Dept: PHARMACY | Facility: CLINIC | Age: 49
End: 2019-08-16
Payer: COMMERCIAL

## 2019-08-23 ENCOUNTER — TELEPHONE (OUTPATIENT)
Dept: PAIN MEDICINE | Facility: CLINIC | Age: 49
End: 2019-08-23

## 2019-08-23 NOTE — TELEPHONE ENCOUNTER
Message below sent to Dr Artemio Gusman S Staff   Caller: Unspecified (Today,  8:39 AM)             Denied       This injections is done when certain criteria has been met.  The pt needs to have imaging that matches the symptoms and physical exam.  The information does not match the pt symptoms.   For this reason this procedure is not Medically Necessary.     P2P can be done by calling [phone]  Ref#[id number]  Within 10 days.     Thanks

## 2019-08-23 NOTE — TELEPHONE ENCOUNTER
----- Message from Crispin Issa, Patient Care Assistant sent at 8/23/2019  3:33 PM CDT -----  Contact: AAMIR SNIDER [[id number]]  Name of Who is Calling:AAMIR SNIDER [[id number]]    What is the request in detail:Patient is requesting a call back in regards to status of surgery. Please contact to further discuss and advise      Can the clinic reply by [facility]: Yes    What Number to Call Back if not in [facility]:   [phone]

## 2019-08-27 ENCOUNTER — TELEPHONE (OUTPATIENT)
Dept: PAIN MEDICINE | Facility: CLINIC | Age: 49
End: 2019-08-27

## 2019-08-27 NOTE — TELEPHONE ENCOUNTER
----- Message from Shelly Underwood sent at 8/27/2019  8:09 AM CDT -----  Good Morning,      Following Up on P2P.  Please advise.    Thanks

## 2019-08-27 NOTE — TELEPHONE ENCOUNTER
"----- Message from Elisa Barney sent at 8/27/2019  8:21 AM CDT -----  Contact: AAMIR SNIDER [[id number]]  Name of Who is Calling:  AAMIR SNIDER [[id number]]    What is the request in detail:   Patient called requesting a call back as this pertains to the status of the [id number] with his insurance provider. Patient states, "the upcoming injections were denied by his insurance."   Please give a call back at your earliest convenience and further advise    Can the clinic reply by [facility]: no      Number to Call Back:  AAMIR SNIDER / # [phone]                                      "

## 2019-08-28 ENCOUNTER — TELEPHONE (OUTPATIENT)
Dept: PAIN MEDICINE | Facility: CLINIC | Age: 49
End: 2019-08-28

## 2019-08-28 NOTE — TELEPHONE ENCOUNTER
Spoke with patient to inform of peer to peer done by Dr Ulloa and procedure approval for 08/30/2019

## 2019-08-28 NOTE — TELEPHONE ENCOUNTER
----- Message from Uzma Ulloa MD sent at 8/28/2019  1:04 PM CDT -----  Authorization number: [id number]    ----- Message -----  From: Ewa Luna LPN  Sent: 8/23/2019  11:51 AM  To: Uzma Ulloa MD        ----- Message -----  From: Shelly Underwood  Sent: 8/23/2019   8:39 AM  To: Artemio MONTALVO Staff    Denied      This injections is done when certain criteria has been met.  The pt needs to have imaging that matches the symptoms and physical exam.  The information does not match the pt symptoms.   For this reason this procedure is not Medically Necessary.    P2P can be done by calling [phone]  Ref#[id number]  Within 10 days.    Thanks

## 2019-08-30 ENCOUNTER — HOSPITAL ENCOUNTER (OUTPATIENT)
Facility: OTHER | Age: 49
Discharge: HOME | End: 2019-08-30
Attending: ANESTHESIOLOGY | Admitting: ANESTHESIOLOGY
Payer: COMMERCIAL

## 2019-08-30 VITALS
WEIGHT: 208 LBS | TEMPERATURE: 98 F | OXYGEN SATURATION: 95 % | HEIGHT: 70 IN | SYSTOLIC BLOOD PRESSURE: 127 MMHG | RESPIRATION RATE: 18 BRPM | DIASTOLIC BLOOD PRESSURE: 67 MMHG | BODY MASS INDEX: 29.78 KG/M2 | HEART RATE: 80 BPM

## 2019-08-30 DIAGNOSIS — M54.12 CERVICAL RADICULOPATHY: Primary | ICD-10-CM

## 2019-08-30 DIAGNOSIS — M50.30 DDD (DEGENERATIVE DISC DISEASE), CERVICAL: ICD-10-CM

## 2019-08-30 LAB — POCT GLUCOSE: 168 MG/DL (ref 70–110)

## 2019-08-30 PROCEDURE — 62321 NJX INTERLAMINAR CRV/THRC: CPT | Performed by: ANESTHESIOLOGY

## 2019-08-30 PROCEDURE — 62321 PR INJ CERV/THORAC, W/GUIDANCE: ICD-10-PCS | Mod: ,,, | Performed by: ANESTHESIOLOGY

## 2019-08-30 PROCEDURE — 25000003 PHARM REV CODE 250: Performed by: ANESTHESIOLOGY

## 2019-08-30 PROCEDURE — 62321 NJX INTERLAMINAR CRV/THRC: CPT | Mod: ,,, | Performed by: ANESTHESIOLOGY

## 2019-08-30 PROCEDURE — 25500020 PHARM REV CODE 255: Performed by: ANESTHESIOLOGY

## 2019-08-30 PROCEDURE — 63600175 PHARM REV CODE 636 W HCPCS: Performed by: ANESTHESIOLOGY

## 2019-08-30 PROCEDURE — 82947 ASSAY GLUCOSE BLOOD QUANT: CPT | Performed by: ANESTHESIOLOGY

## 2019-08-30 RX ORDER — LIDOCAINE HYDROCHLORIDE 10 MG/ML
INJECTION INFILTRATION; PERINEURAL
Status: DISCONTINUED | OUTPATIENT
Start: 2019-08-30 | End: 2019-08-30 | Stop reason: HOSPADM

## 2019-08-30 RX ORDER — METHYLPREDNISOLONE ACETATE 80 MG/ML
INJECTION, SUSPENSION INTRA-ARTICULAR; INTRALESIONAL; INTRAMUSCULAR; SOFT TISSUE
Status: DISCONTINUED | OUTPATIENT
Start: 2019-08-30 | End: 2019-08-30 | Stop reason: HOSPADM

## 2019-08-30 RX ORDER — SODIUM CHLORIDE 9 MG/ML
500 INJECTION, SOLUTION INTRAVENOUS CONTINUOUS
Status: DISCONTINUED | OUTPATIENT
Start: 2019-08-30 | End: 2019-08-30 | Stop reason: HOSPADM

## 2019-08-30 NOTE — INTERVAL H&P NOTE
The patient has been examined and the H&P has been reviewed:    I concur with the findings and no changes have occurred since H&P was written.    No change in the location or quality of the pain since the most recent clinic visit.  No new symptoms.  He wishes to proceed with the procedure today.    PE, unchanged from previous:  CV:  RRR  Resp: unlabored, no wheezing.    NPO since MN.    Anesthesia/Surgery risks, benefits and alternative options discussed and understood by patient/family.          There are no hospital problems to display for this patient.    I have seen the patient with the resident physician.  We have come up with the above plan.  The patient is in agreement with our plan. I agree with the above note which I have edited where appropriate.

## 2019-08-30 NOTE — OP NOTE
Date: 08/30/2019    Procedure: C7/T1 Epidural Steroid Injection    Referring Provider: None     Pre-op diagnosis: Cervical radiculopathy [M54.12]    Post-op diagnosis: Cervical radiculopathy [M54.12]    Physician: Dr. Uzma Ulloa     Assistant: Dr. Strong    Anesthestia: local    All medications, allergies, and relevant histories were reviewed. No recent antibiotics or infections.   A time-out was taken to verify the correct patient, procedure, laterality, and appropriate medications/allergies.    Fluoroscopically-Guided, Contrast-Controlled Cervical Interlaminar Epidural Steroid Injection:     Following denial of allergy and review of potential side effects and complications including but not necessarily limited to infection, allergic reaction, local tissue breakdown, nerve injury, paresis, paralysis, spinal cord injury, and seizure, the patient indicated they understood and agreed to proceed.     The patient was positioned prone and prepped and draped in the usual sterile fashion. The C7-T1 interspace was identified fluoroscopically. The skin was anesthetized via 25-gauge 1.5 needle with approximately 4cc of bicarbonated 1% lidocaine. A 20-gauge Tuohy needle was atraumatically introduced and advanced under fluoroscopic guidance. Using RON to saline technique with 0.9 % saline the epidural space was entered without difficulties. Following negative aspiration for blood and CSF and confirming the absence of paresthesias, injection of approximately 1.5 cc of Omnipaque 300 demonstrated excellent epidural spread without vascular or intrathecal uptake. At this point, 2 cc of 0.9% normal saline with 80 mg of Depo-Medrol was injected without complication. The needle was flushed with 1% lidocaine withdrawn.     The patient was followedpost procedure and discharged under their own power in excellent condition.    Future Management:   If helpful, can repeat as needed.    Follow up with my clinic in 3 weeks or sooner if  needed    I certify that I provided the above services.  I was present for the entire procedure, which was performed by myself with the assistance of the resident physician.  There were no parts of the procedure that were performed not by myself or without my direct supervision.

## 2019-08-30 NOTE — DISCHARGE INSTRUCTIONS

## 2019-09-25 ENCOUNTER — PATIENT OUTREACH (OUTPATIENT)
Dept: ADMINISTRATIVE | Facility: OTHER | Age: 49
End: 2019-09-25

## 2019-09-26 ENCOUNTER — TELEPHONE (OUTPATIENT)
Dept: PAIN MEDICINE | Facility: CLINIC | Age: 49
End: 2019-09-26

## 2019-09-26 NOTE — TELEPHONE ENCOUNTER
My name is Staff, I am contacting you from [facility] pain [facility] regarding your appointment scheduled for 09.27.19, with KIKA, just confirming you will be able to make it.    If you feel you need to reschedule or canceled please give our office a call so we can better assist you.      Staff requesting patient to arrive 15 mins ahead of schedule appointment time.    Staff left a voicemail reminding pt of their appt

## 2019-09-27 ENCOUNTER — OFFICE VISIT (OUTPATIENT)
Dept: PAIN MEDICINE | Facility: CLINIC | Age: 49
End: 2019-09-27
Payer: COMMERCIAL

## 2019-09-27 ENCOUNTER — LAB VISIT (OUTPATIENT)
Dept: LAB | Facility: OTHER | Age: 49
End: 2019-09-27
Attending: FAMILY MEDICINE
Payer: COMMERCIAL

## 2019-09-27 VITALS
TEMPERATURE: 99 F | DIASTOLIC BLOOD PRESSURE: 96 MMHG | SYSTOLIC BLOOD PRESSURE: 145 MMHG | WEIGHT: 206.81 LBS | BODY MASS INDEX: 29.61 KG/M2 | HEIGHT: 70 IN | RESPIRATION RATE: 18 BRPM | HEART RATE: 97 BPM

## 2019-09-27 DIAGNOSIS — M54.12 CERVICAL RADICULOPATHY: Primary | ICD-10-CM

## 2019-09-27 DIAGNOSIS — E11.9 TYPE 2 DIABETES MELLITUS WITHOUT COMPLICATION, WITHOUT LONG-TERM CURRENT USE OF INSULIN: ICD-10-CM

## 2019-09-27 DIAGNOSIS — M54.12 LEFT CERVICAL RADICULOPATHY: ICD-10-CM

## 2019-09-27 LAB
ESTIMATED AVG GLUCOSE: 143 MG/DL (ref 68–131)
HBA1C MFR BLD HPLC: 6.6 % (ref 4–5.6)

## 2019-09-27 PROCEDURE — 3008F BODY MASS INDEX DOCD: CPT | Mod: CPTII,S$GLB,, | Performed by: NURSE PRACTITIONER

## 2019-09-27 PROCEDURE — 99999 PR PBB SHADOW E&M-EST. PATIENT-LVL III: CPT | Mod: PBBFAC,,, | Performed by: NURSE PRACTITIONER

## 2019-09-27 PROCEDURE — 99213 OFFICE O/P EST LOW 20 MIN: CPT | Mod: S$GLB,,, | Performed by: NURSE PRACTITIONER

## 2019-09-27 PROCEDURE — 99999 PR PBB SHADOW E&M-EST. PATIENT-LVL III: ICD-10-PCS | Mod: PBBFAC,,, | Performed by: NURSE PRACTITIONER

## 2019-09-27 PROCEDURE — 99213 PR OFFICE/OUTPT VISIT, EST, LEVL III, 20-29 MIN: ICD-10-PCS | Mod: S$GLB,,, | Performed by: NURSE PRACTITIONER

## 2019-09-27 PROCEDURE — 3008F PR BODY MASS INDEX (BMI) DOCUMENTED: ICD-10-PCS | Mod: CPTII,S$GLB,, | Performed by: NURSE PRACTITIONER

## 2019-09-27 PROCEDURE — 36415 COLL VENOUS BLD VENIPUNCTURE: CPT

## 2019-09-27 PROCEDURE — 83036 HEMOGLOBIN GLYCOSYLATED A1C: CPT

## 2019-09-27 NOTE — PROGRESS NOTES
Subjective:     Patient ID: Osmel Nair is a 49 y.o. male.    Chief Complaint: Pain    Consulted by: Self    Disclaimer: This note was generated using voice recognition software.  There may be typographical errors that were missed during proofreading.       Previous HPI interval history from visit with Dr. Lee, who is no longer with [facility]           SUBJECTIVE:     Osmel Nair is a 49 y.o. male with hx of T2DM who presents to the clinic for the evaluation of left shoulder pain. The pain started 5 weeks ago and symptoms have been persistent. No trauma or inciting event. The pain is located in the left upper trapezius area and radiates down the arm into the thumb and index finger. The pain initially involved the neck although this has resolved.  The arm and hand pain is described as numb and tingling. The shoulder pain is described as sharp and shooting and is rated as 2/10. The pain is rated with a score of  2/10 on the BEST day and a score of 8/10 on the WORST day.  Symptoms interfere with daily activity, sleeping and work. The pain is exacerbated by sleeping, lots of physical exertion, and manual labor.  The pain is mitigated by rest and medication. He reports spending 0 hours per day reclining. The patient reports 6-8 hours of uninterrupted sleep per night.     Patient denies urinary incontinence, bowel incontinence, significant weight loss, significant motor weakness and loss of sensations.    INTERVAL HISTORY (10/22/2018):     Osmel Nair presents to the clinic today for a follow-up appointment for neck pain. The patient reports 100% pain relief after cervical MARIA LUZ performed on 10/30/2017 which lasted 11 months. He reports the pain in his neck is gradually starting to return. The pain started one week ago. No injury or inciting event.The pain is located in the left cervical paraspinal area and radiates into the left shoulder. Current pain intensity is 2/10.  He describes it as sharp  sensation. The pain is exacerbated by cycling and head turning. Aleve provides mild-moderate relief.     Patient denies urinary incontinence, bowel incontinence, significant weight loss, significant motor weakness and loss of sensations.    Interval History (8/8/2019):  He returns today for follow up.  He reports that the Medrol Dosepak given at his most recent visit in 10/2018 provided very good relief for ~7.5 months, then similar pain as before returned. Pain starts in his left neck with consistent radiation to the left trap and shoulder region, occasionally down to the elbow, and also with occasional paresthesias in the left index and thumb. Denies weakness. Denies bowel or bladder incontinence. Cervical MARIA LUZ x1, oral steroids and NSAIDs, and yoga has been helpful for the pain.    Interval History (9/27/2019):  The patient is here for follow up.  He is s/p cervical MARIA LUZ on 8/30/19 with 80% relief.  His pain is now mild.  He has no neck pain.  He has some numbness and pain to left hand intermittently, mainly with turning his head.  He has been working on stretching.  He stopped Lodine because he found limited benefit.  He takes Tylenol as needed which is helpful.  His pain today is 2/10.    Aggravating factors: standing for long periods, sitting at a desk for long periods    Mitigating factors: medications, raising arm above head improves pain    Previously seeing: Dr Mata    Physical Therapy: attended 3-4 visits in 2017 with partial relief, equivalent to yoga    Non-pharmacologic Treatment:     · Ice/Heat: Heat partially effective  · TENS: hasn't tried  · Massage: hasnt tried  · Chiropractic care: hasnt tried  · Acupuncture: hasnt tried  · Other: Yoga is helpful, attends at least weekly         Pain Medications:         · Currently taking: OTC Tylenol PRN    · Has tried in the past:    · Opioids: hasnt tried  · NSAIDS: Naproxen and Lodine with minimal relief  · Tylenol: minimal relief  · Muscle relaxants:  hasnt tried  · TCAs: hasnt tried  · SNRIs: hasnt tried  · Anticonvulsants: Gabapentin 300 BID, not helpful, experienced paresthesias in both hands stopped  · topical creams: icy hot provided temporary relief  · Other: none    Blood thinners: no    Interventional Therapies:   · 10/30/2017:  C7/T1 MARIA LUZ:  100% pain relief for 11 months  · 8/30/2017 C7/T1 MARIA LUZ: 80% relief    Relevant Surgeries: none    Affecting sleep? yes    Affecting daily activities? yes    Depressive symptoms? denies          · SI/HI? No    Work status: works as  here in [place]    Prescription Monitoring Program database:  Reviewed and consistent with medication use as prescribed. (No record or any controlled substance RXs)    Last 3 PDI Scores 9/27/2019 8/8/2019 10/22/2018   Pain Disability Index (PDI) 24 [phone]       Opioid Risk Score     None          GENERAL:  No weight loss, malaise or fevers.  HEENT:   No recent changes in vision or hearing  NECK:  Negative for lumps, no difficulty with swallowing.  RESPIRATORY:  Negative for cough, wheezing or shortness of breath, patient denies any recent URI.  CARDIOVASCULAR:  Negative for chest pain, leg swelling or palpitations.  GI:  Negative for abdominal discomfort, blood in stools or black stools or change in bowel habits.  MUSCULOSKELETAL:  See HPI.  SKIN:  Negative for lesions, rash, and itching.  PSYCH:  Negative for mood disorder or recent psychosocial stressors.    HEMATOLOGY/LYMPHOLOGY:  Negative for prolonged bleeding, bruising easily or swollen nodes.    ENDO: +diabetes, denies thyroid dysfunction  NEURO:   + history of stage I lyme disease treated with antibiotics. No history of headaches, syncope, paralysis, seizures or tremors.  All other reviewed and negative other than HPI.          Past Medical History:   Diagnosis Date    Diabetes mellitus, type 2 07/2016    Lyme disease 2016       Past Surgical History:   Procedure Laterality Date    EPIDURAL STEROID INJECTION N/A  8/30/2019    Procedure: INJECTION, STEROID, EPIDURAL, CERVICAL C7-T1 INTERLAMINAR need consent;  Surgeon: Uzma Ulloa MD;  Location: [facility];  Service: Pain Management;  Laterality: N/A;    KNEE ARTHROSCOPY Right 1989       Review of patient's allergies indicates:  No Known Allergies    Current Outpatient Medications   Medication Sig Dispense Refill    CONTOUR NEXT TEST STRIPS Strp TEST THREE TIMES DAILY 300 strip 0    etodolac (LODINE) 400 MG tablet Take 1 tablet (400 mg total) by mouth 2 (two) times daily as needed (pain). 30 tablet 3    metFORMIN (GLUCOPHAGE) 500 MG tablet TAKE 1 TABLET(500 MG) BY MOUTH TWICE DAILY WITH MEALS 180 tablet 3    rosuvastatin (CRESTOR) 5 MG tablet TAKE 1 TABLET(5 MG) BY MOUTH EVERY DAY 90 tablet 0     No current facility-administered medications for this visit.        Family History   Problem Relation Age of Onset    Diabetes Mother     Diabetes Father     Diabetes Sister        Social History     Socioeconomic History    Marital status: Single     Spouse name: Not on file    Number of children: Not on file    Years of education: Not on file    Highest education level: Not on file   Occupational History    Not on file   Social Needs    Financial resource strain: Not hard at all    Food insecurity:     Worry: Never true     Inability: Never true    Transportation needs:     Medical: No     Non-medical: No   Tobacco Use    Smoking status: Current Every Day Smoker    Smokeless tobacco: Never Used   Substance and Sexual Activity    Alcohol use: Yes     Frequency: 2-4 times a month     Drinks per session: 3 or 4     Binge frequency: Never    Drug use: Not on file    Sexual activity: Not on file   Lifestyle    Physical activity:     Days per week: 4 days     Minutes per session: 60 min    Stress: Not at all   Relationships    Social connections:     Talks on phone: Once a week     Gets together: Never     Attends Pentec[employer]al service: Not on file     Active  "member of club or organization: No     Attends meetings of clubs or organizations: Never     Relationship status: Never    Other Topics Concern    Not on file   Social History Narrative    Not on file       Objective:     Vitals:    09/27/19 0921   BP: (!) 145/96   Pulse: 97   Resp: 18   Temp: 98.5 °F (36.9 °C)   Weight: 93.8 kg (206 lb 12.7 oz)   Height: 5' 10" (1.778 m)   PainSc:   2       GEN:  Well developed, well nourished.  No acute distress.  No pain behavior.  HEENT:  No trauma.  Mucous membranes moist.  Nares patent bilaterally.  PSYCH: Normal affect. Thought content appropriate.  CHEST:  Breathing symmetric.  No audible wheezing.  ABD: Soft, non-distended.  SKIN:  Warm, pink, dry.  No rash on exposed areas.    EXT:  No cyanosis, clubbing, or edema.  No color change or changes in nail or hair growth.  neg Tinel's over right carpal tunnel. neg Tinel's over right cubital tunnel.  neg Tinel's over left carpal tunnel. neg Tinel's over left cubital tunnel.  5/5 motor strength throughout upper extremities.   Sensory: no sensory deficit in the upper extremities.  NEURO/MUSCULOSKELETAL:  Fully alert, oriented, and appropriate. Speech normal alejandro. No cranial nerve deficits.   Gait: normal gait, no AD.     Reflexes: 2+ and symmetric throughout.  Absent Holly's bilaterally.  C-Spine: Full ROM with pain on extension, and rotation to both sides.  Negative facet loading.  Negative Spurling.  No TTP over cervical facet joints, cervical paraspinal muscles        Imaging:        The imaging studies listed below were independently reviewed by me, and I agree with the findings as documented below.     MRI C-spine 10/6/2017:  Findings:    There is straightening of the normal cervical lordosis. The vertebral body heights and alignment are within normal limits. Intervertebral disk spaces are well preserved. Bone marrow signal is normal.    Visualized posterior fossa structures and cervical cord are " unremarkable.    Limited evaluation of the neck soft tissues is unremarkable.    C2-3: No spinal canal stenosis or neuroforaminal narrowing.    C3-4: Posterior disc osteophyte complex formation and uncovertebral arthrosis resulting in mild bilateral neuroforaminal narrowing and no spinal canal stenosis.    C4-5: Posterior disc osteophyte complex formation and uncovertebral arthrosis resulting in mild bilateral neuroforaminal narrowing and no spinal canal stenosis.    C5-6: Posterior disc osteophyte complex formation with uncovertebral arthrosis resulting in moderate left and mild right neuroforaminal narrowing. No spinal canal stenosis    C6-7: Posterior disc osteophyte complex formation without spinal canal stenosis or neuroforaminal narrowing.      Impression       Mild cervical spondylosis with varying degrees of mild to moderate neuroforaminal narrowing, as described above.       Assessment:     Encounter Diagnoses   Name Primary?    Cervical radiculopathy Yes    Left cervical radiculopathy        Plan:     Diagnoses and all orders for this visit:    Cervical radiculopathy    Left cervical radiculopathy         His neck pain is consistent with the above.    We discussed the assessment and recommendations.  All available images were reviewed. We discussed the disease process, prognosis, treatment plan, and risks and benefits. The patient is aware of the risks and benefits of the medications being prescribed, common side effects, and proper usage. The following is the plan we agreed on:     1. He is s/p cervical MARIA LUZ with benefit.  Will monitor progress.  Can repeat as needed.  2. Can continue OTC Tylenol not to exceed 3000 mg daily.  Previous CMP reviewed.  3. Continue yoga, which is excellent for muscle strengthening, ROM, and pain control.    4. RTC PRN.        Leola Schneider, KIMBERLY  09/27/2019     The above plan and management options were discussed at length with patient. Patient is in agreement with the  above and verbalized understanding.

## 2019-09-30 ENCOUNTER — IMMUNIZATION (OUTPATIENT)
Dept: PHARMACY | Facility: CLINIC | Age: 49
End: 2019-09-30
Payer: COMMERCIAL

## 2019-10-02 ENCOUNTER — TELEPHONE (OUTPATIENT)
Dept: INTERNAL MEDICINE | Facility: CLINIC | Age: 49
End: 2019-10-02

## 2019-10-02 NOTE — TELEPHONE ENCOUNTER
----- Message from Kam Phipps MD sent at 9/30/2019 10:55 AM CDT -----  A1c is good as expected.  No changes in medications.  Followup as scheduled.

## 2019-10-09 ENCOUNTER — OFFICE VISIT (OUTPATIENT)
Dept: PODIATRY | Facility: CLINIC | Age: 49
End: 2019-10-09
Payer: COMMERCIAL

## 2019-10-09 VITALS
HEART RATE: 76 BPM | DIASTOLIC BLOOD PRESSURE: 82 MMHG | RESPIRATION RATE: 18 BRPM | BODY MASS INDEX: 29.98 KG/M2 | WEIGHT: 209.44 LBS | HEIGHT: 70 IN | SYSTOLIC BLOOD PRESSURE: 133 MMHG

## 2019-10-09 DIAGNOSIS — E11.9 TYPE 2 DIABETES MELLITUS WITHOUT COMPLICATION, WITHOUT LONG-TERM CURRENT USE OF INSULIN: Primary | ICD-10-CM

## 2019-10-09 PROCEDURE — 99999 PR PBB SHADOW E&M-EST. PATIENT-LVL III: CPT | Mod: PBBFAC,,, | Performed by: PODIATRIST

## 2019-10-09 PROCEDURE — 3008F PR BODY MASS INDEX (BMI) DOCUMENTED: ICD-10-PCS | Mod: CPTII,S$GLB,, | Performed by: PODIATRIST

## 2019-10-09 PROCEDURE — 3044F HG A1C LEVEL LT 7.0%: CPT | Mod: CPTII,S$GLB,, | Performed by: PODIATRIST

## 2019-10-09 PROCEDURE — 99213 PR OFFICE/OUTPT VISIT, EST, LEVL III, 20-29 MIN: ICD-10-PCS | Mod: S$GLB,,, | Performed by: PODIATRIST

## 2019-10-09 PROCEDURE — 3044F PR MOST RECENT HEMOGLOBIN A1C LEVEL <7.0%: ICD-10-PCS | Mod: CPTII,S$GLB,, | Performed by: PODIATRIST

## 2019-10-09 PROCEDURE — 99999 PR PBB SHADOW E&M-EST. PATIENT-LVL III: ICD-10-PCS | Mod: PBBFAC,,, | Performed by: PODIATRIST

## 2019-10-09 PROCEDURE — 3008F BODY MASS INDEX DOCD: CPT | Mod: CPTII,S$GLB,, | Performed by: PODIATRIST

## 2019-10-09 PROCEDURE — 99213 OFFICE O/P EST LOW 20 MIN: CPT | Mod: S$GLB,,, | Performed by: PODIATRIST

## 2019-10-09 NOTE — PROGRESS NOTES
Subjective:      Patient ID: Osmel Nair is a 49 y.o. male.    Chief Complaint: PCP (Kam Phipps MD 8/14/19) and Diabetic Foot Exam    Osmel is a 49 y.o. male who presents to the clinic upon referral from Dr. Eli parsons. provider found  for evaluation and treatment of diabetic feet. Osmel has a past medical history of Diabetes mellitus, type 2 (07/2016) and Lyme disease (2016). Patient relates no major problem with feet. Only complaints today consist of yearly DM foot examination.     PCP: Kam Phipps MD    Date Last Seen by PCP:   Chief Complaint   Patient presents with    PCP     Kam Phipps MD 8/14/19    Diabetic Foot Exam         Current shoe gear: Casual shoes    Hemoglobin A1C   Date Value Ref Range Status   09/27/2019 6.6 (H) 4.0 - 5.6 % Final     Comment:     ADA Screening Guidelines:  5.7-6.4%  Consistent with prediabetes  >or=6.5%  Consistent with diabetes  High levels of fetal hemoglobin interfere with the HbA1C  assay. Heterozygous hemoglobin variants (HbS, HgC, etc)do  not significantly interfere with this assay.   However, presence of multiple variants may affect accuracy.     03/14/2019 6.5 (H) 4.0 - 5.6 % Final     Comment:     ADA Screening Guidelines:  5.7-6.4%  Consistent with prediabetes  >or=6.5%  Consistent with diabetes  High levels of fetal hemoglobin interfere with the HbA1C  assay. Heterozygous hemoglobin variants (HbS, HgC, etc)do  not significantly interfere with this assay.   However, presence of multiple variants may affect accuracy.     04/03/2018 5.9 (H) 4.0 - 5.6 % Final     Comment:     According to ADA guidelines, hemoglobin A1c <7.0% represents  optimal control in non-pregnant diabetic patients. Different  metrics may apply to specific patient populations.   Standards of Medical Care in Diabetes-2016.  For the purpose of screening for the presence of diabetes:  <5.7%     Consistent with the absence of diabetes  5.7-6.4%  Consistent with  increasing risk for diabetes   (prediabetes)  >or=6.5%  Consistent with diabetes  Currently, no consensus exists for use of hemoglobin A1c  for diagnosis of diabetes for children.  This Hemoglobin A1c assay has significant interference with fetal   hemoglobin   (HbF). The results are invalid for patients with abnormal amounts of   HbF,   including those with known Hereditary Persistence   of Fetal Hemoglobin. Heterozygous hemoglobin variants (HbAS, HbAC,   HbAD, HbAE, HbA2) do not significantly interfere with this assay;   however, presence of multiple variants in a sample may impact the %   interference.             Review of Systems   Constitution: Negative for chills, decreased appetite and fever.   Cardiovascular: Negative for leg swelling.   Skin: Positive for dry skin. Negative for flushing and itching.   Musculoskeletal: Negative for arthritis, joint pain, joint swelling and myalgias.   Gastrointestinal: Negative for nausea and vomiting.   Neurological: Negative for loss of balance, numbness and paresthesias.         Patient Active Problem List   Diagnosis    Left cervical radiculopathy    Myalgia and myositis    Type 2 diabetes mellitus without complication, without long-term current use of insulin    Hyperlipidemia type II    Cervical radicular pain    Cervical spondylosis    Cervical radiculopathy    Familial hypercholesterolemia    Muscle pain    Type 2 diabetes mellitus without complication    DDD (degenerative disc disease), cervical       Current Outpatient Medications on File Prior to Visit   Medication Sig Dispense Refill    CONTOUR NEXT TEST STRIPS Strp TEST THREE TIMES DAILY 300 strip 0    metFORMIN (GLUCOPHAGE) 500 MG tablet TAKE 1 TABLET(500 MG) BY MOUTH TWICE DAILY WITH MEALS 180 tablet 3    rosuvastatin (CRESTOR) 5 MG tablet TAKE 1 TABLET(5 MG) BY MOUTH EVERY DAY 90 tablet 0     No current facility-administered medications on file prior to visit.        Review of patient's  "allergies indicates:  No Known Allergies    Past Surgical History:   Procedure Laterality Date    EPIDURAL STEROID INJECTION N/A 8/30/2019    Procedure: INJECTION, STEROID, EPIDURAL, CERVICAL C7-T1 INTERLAMINAR need consent;  Surgeon: Uzma Ulloa MD;  Location: [facility];  Service: Pain Management;  Laterality: N/A;    KNEE ARTHROSCOPY Right 1989       Family History   Problem Relation Age of Onset    Diabetes Mother     Diabetes Father     Diabetes Sister        Social History     Socioeconomic History    Marital status: Single     Spouse name: Not on file    Number of children: Not on file    Years of education: Not on file    Highest education level: Not on file   Occupational History    Not on file   Social Needs    Financial resource strain: Not hard at all    Food insecurity:     Worry: Never true     Inability: Never true    Transportation needs:     Medical: No     Non-medical: No   Tobacco Use    Smoking status: Current Every Day Smoker    Smokeless tobacco: Never Used   Substance and Sexual Activity    Alcohol use: Yes     Frequency: 2-4 times a month     Drinks per session: 3 or 4     Binge frequency: Never    Drug use: Not on file    Sexual activity: Not on file   Lifestyle    Physical activity:     Days per week: 4 days     Minutes per session: 60 min    Stress: Not at all   Relationships    Social connections:     Talks on phone: Once a week     Gets together: Never     Attends Nondenominational service: Not on file     Active member of club or organization: No     Attends meetings of clubs or organizations: Never     Relationship status: Never    Other Topics Concern    Not on file   Social History Narrative    Not on file               Objective:       Vitals:    10/09/19 1119   BP: 133/82   Pulse: 76   Resp: 18   Weight: 95 kg (209 lb 7 oz)   Height: 5' 10" (1.778 m)   PainSc: 0-No pain        Physical Exam   Constitutional: He is oriented to person, place, and time. " He appears well-developed and well-nourished.   Cardiovascular: Intact distal pulses.   Pulses:       Dorsalis pedis pulses are 2+ on the right side, and 2+ on the left side.        Posterior tibial pulses are 2+ on the right side, and 2+ on the left side.   dorsalis pedis and posterior tibial pulses are palpable bilaterally. Capillary refill time is within normal limits. + pedal hair growth          Musculoskeletal: Normal range of motion. He exhibits no edema or tenderness.   Adequate joint range of motion without pain, limitation, nor crepitation Bilateral feet and ankle joints. Muscle strength is 5/5 in all groups bilaterally.         Feet:   Right Foot:   Protective Sensation: 5 sites tested. 5 sites sensed.   Left Foot:   Protective Sensation: 5 sites tested. 5 sites sensed.   Neurological: He is alert and oriented to person, place, and time. He has normal strength. No sensory deficit.   [place] 5.07 monofilament is intact bilateral feet.      Skin: Skin is warm, dry and intact. No abrasion, no bruising, no ecchymosis, no lesion and no rash noted. No erythema.   Nails 1-5 bilaterally  are normal in length, thickness, coloration and are non dystrophic.      Psychiatric: He has a normal mood and affect. His behavior is normal.   Vitals reviewed.            Assessment:       Encounter Diagnosis   Name Primary?    Type 2 diabetes mellitus without complication, without long-term current use of insulin Yes         Plan:       Osmel was seen today for pcp and diabetic foot exam.    Diagnoses and all orders for this visit:    Type 2 diabetes mellitus without complication, without long-term current use of insulin      I counseled the patient on his conditions, their implications and medical management.    - Shoe inspection. Diabetic Foot Education. Patient reminded of the importance of good nutrition and blood sugar control to help prevent podiatric complications of diabetes. Patient instructed on proper foot  hygeine. We discussed wearing proper shoe gear, daily foot inspections, never walking without protective shoe gear, caution putting sharp instruments to feet     - Discussed DM foot care:  Wear comfortable, proper fitting shoes. Wash feet daily. Dry well. After drying, apply moisturizer to feet (no lotion to webspaces). Inspect feet daily for skin breaks, blisters, swelling, or redness. Wear cotton socks (preferably white)  Change socks every day. Do NOT walk barefoot. Do NOT use heating pads or warm/hot water soaks     - Discussed importance of daily moisturizer to the feet such as Gold bonds diabetic foot cream    - Patient is low risk for developing lower extremity issues secondary to diabetes    - RTC in  1 year for a diabetic foot exam  or sooner if problems      .

## 2019-10-24 DIAGNOSIS — Z00.00 PREVENTATIVE HEALTH CARE: ICD-10-CM

## 2019-10-24 DIAGNOSIS — E11.9 TYPE 2 DIABETES MELLITUS WITHOUT COMPLICATION, WITHOUT LONG-TERM CURRENT USE OF INSULIN: ICD-10-CM

## 2019-10-24 DIAGNOSIS — E78.01 HYPERLIPIDEMIA TYPE II: ICD-10-CM

## 2019-10-24 RX ORDER — BLOOD SUGAR DIAGNOSTIC
STRIP MISCELLANEOUS
Qty: 300 STRIP | Refills: 0 | Status: SHIPPED | OUTPATIENT
Start: 2019-10-24 | End: 2020-10-14 | Stop reason: SDUPTHER

## 2019-10-24 RX ORDER — ROSUVASTATIN CALCIUM 5 MG/1
TABLET, COATED ORAL
Qty: 90 TABLET | Refills: 0 | Status: SHIPPED | OUTPATIENT
Start: 2019-10-24 | End: 2019-12-20 | Stop reason: SDUPTHER

## 2019-12-04 ENCOUNTER — TELEPHONE (OUTPATIENT)
Dept: PRIMARY CARE CLINIC | Facility: CLINIC | Age: 49
End: 2019-12-04

## 2019-12-04 ENCOUNTER — PATIENT MESSAGE (OUTPATIENT)
Dept: ADMINISTRATIVE | Facility: OTHER | Age: 49
End: 2019-12-04

## 2019-12-04 DIAGNOSIS — E11.9 TYPE 2 DIABETES MELLITUS WITHOUT COMPLICATION, WITHOUT LONG-TERM CURRENT USE OF INSULIN: Primary | ICD-10-CM

## 2019-12-04 NOTE — TELEPHONE ENCOUNTER
----- Message from Sandra Bowling sent at 12/4/2019  1:10 PM CST -----  Contact: pt  Name of Who is Calling: Osmel Nair      What is the request in detail: pt would like orders for a protein urine check. Please contact to further discuss and advise.      Can the clinic reply by [facility]: n      [facility] [phone]

## 2019-12-05 DIAGNOSIS — E11.9 TYPE 2 DIABETES MELLITUS: ICD-10-CM

## 2019-12-05 NOTE — TELEPHONE ENCOUNTER
Spoke to Mr. Nair, and confirmed date and time of lab.  Patient states understanding with no further questions or concerns.

## 2019-12-10 ENCOUNTER — LAB VISIT (OUTPATIENT)
Dept: LAB | Facility: OTHER | Age: 49
End: 2019-12-10
Attending: FAMILY MEDICINE
Payer: COMMERCIAL

## 2019-12-10 DIAGNOSIS — E11.9 TYPE 2 DIABETES MELLITUS WITHOUT COMPLICATION, WITHOUT LONG-TERM CURRENT USE OF INSULIN: ICD-10-CM

## 2019-12-10 LAB
ALBUMIN/CREAT UR: ABNORMAL UG/MG (ref 0–30)
CREAT UR-MCNC: >450 MG/DL (ref 23–375)
MICROALBUMIN UR DL<=1MG/L-MCNC: 20 UG/ML

## 2019-12-10 PROCEDURE — 82043 UR ALBUMIN QUANTITATIVE: CPT

## 2019-12-12 ENCOUNTER — PATIENT MESSAGE (OUTPATIENT)
Dept: PODIATRY | Facility: CLINIC | Age: 49
End: 2019-12-12

## 2019-12-12 ENCOUNTER — PATIENT MESSAGE (OUTPATIENT)
Dept: INTERNAL MEDICINE | Facility: CLINIC | Age: 49
End: 2019-12-12

## 2019-12-12 NOTE — TELEPHONE ENCOUNTER
It does not appear that there is any damage to the kidneys from the diabetes.  However if he would like we can repeat the test

## 2019-12-20 DIAGNOSIS — E78.01 HYPERLIPIDEMIA TYPE II: ICD-10-CM

## 2019-12-20 DIAGNOSIS — Z00.00 PREVENTATIVE HEALTH CARE: ICD-10-CM

## 2019-12-20 DIAGNOSIS — E11.9 TYPE 2 DIABETES MELLITUS WITHOUT COMPLICATION, WITHOUT LONG-TERM CURRENT USE OF INSULIN: ICD-10-CM

## 2019-12-20 RX ORDER — ROSUVASTATIN CALCIUM 5 MG/1
5 TABLET, COATED ORAL DAILY
Qty: 90 TABLET | Refills: 1 | Status: SHIPPED | OUTPATIENT
Start: 2019-12-20 | End: 2020-03-30 | Stop reason: SDUPTHER

## 2020-01-08 ENCOUNTER — PATIENT MESSAGE (OUTPATIENT)
Dept: INTERNAL MEDICINE | Facility: CLINIC | Age: 50
End: 2020-01-08

## 2020-01-08 DIAGNOSIS — R09.89 CHEST CONGESTION: ICD-10-CM

## 2020-01-08 DIAGNOSIS — R05.9 COUGH: Primary | ICD-10-CM

## 2020-01-08 RX ORDER — AZITHROMYCIN 250 MG/1
TABLET, FILM COATED ORAL
Qty: 6 TABLET | Refills: 0 | Status: SHIPPED | OUTPATIENT
Start: 2020-01-08 | End: 2020-01-13

## 2020-03-13 ENCOUNTER — LAB VISIT (OUTPATIENT)
Dept: LAB | Facility: OTHER | Age: 50
End: 2020-03-13
Attending: FAMILY MEDICINE
Payer: COMMERCIAL

## 2020-03-13 ENCOUNTER — OFFICE VISIT (OUTPATIENT)
Dept: INTERNAL MEDICINE | Facility: CLINIC | Age: 50
End: 2020-03-13
Attending: FAMILY MEDICINE
Payer: COMMERCIAL

## 2020-03-13 VITALS
DIASTOLIC BLOOD PRESSURE: 78 MMHG | HEART RATE: 101 BPM | WEIGHT: 209.88 LBS | OXYGEN SATURATION: 95 % | HEIGHT: 70 IN | BODY MASS INDEX: 30.05 KG/M2 | SYSTOLIC BLOOD PRESSURE: 108 MMHG

## 2020-03-13 DIAGNOSIS — E78.01 HYPERLIPIDEMIA TYPE II: ICD-10-CM

## 2020-03-13 DIAGNOSIS — Z00.00 PREVENTATIVE HEALTH CARE: Primary | ICD-10-CM

## 2020-03-13 DIAGNOSIS — E11.9 TYPE 2 DIABETES MELLITUS WITHOUT COMPLICATION, WITHOUT LONG-TERM CURRENT USE OF INSULIN: ICD-10-CM

## 2020-03-13 DIAGNOSIS — Z00.00 PREVENTATIVE HEALTH CARE: ICD-10-CM

## 2020-03-13 LAB
ALBUMIN SERPL BCP-MCNC: 4.6 G/DL (ref 3.5–5.2)
ALP SERPL-CCNC: 78 U/L (ref 55–135)
ALT SERPL W/O P-5'-P-CCNC: 38 U/L (ref 10–44)
ANION GAP SERPL CALC-SCNC: 10 MMOL/L (ref 8–16)
AST SERPL-CCNC: 21 U/L (ref 10–40)
BILIRUB SERPL-MCNC: 0.5 MG/DL (ref 0.1–1)
BUN SERPL-MCNC: 15 MG/DL (ref 6–20)
CALCIUM SERPL-MCNC: 10.2 MG/DL (ref 8.7–10.5)
CHLORIDE SERPL-SCNC: 102 MMOL/L (ref 95–110)
CO2 SERPL-SCNC: 28 MMOL/L (ref 23–29)
CREAT SERPL-MCNC: 0.9 MG/DL (ref 0.5–1.4)
EST. GFR  (AFRICAN AMERICAN): >60 ML/MIN/1.73 M^2
EST. GFR  (NON AFRICAN AMERICAN): >60 ML/MIN/1.73 M^2
GLUCOSE SERPL-MCNC: 89 MG/DL (ref 70–110)
POTASSIUM SERPL-SCNC: 4.6 MMOL/L (ref 3.5–5.1)
PROT SERPL-MCNC: 7.4 G/DL (ref 6–8.4)
SODIUM SERPL-SCNC: 140 MMOL/L (ref 136–145)
TSH SERPL DL<=0.005 MIU/L-ACNC: 0.79 UIU/ML (ref 0.4–4)

## 2020-03-13 PROCEDURE — 99999 PR PBB SHADOW E&M-EST. PATIENT-LVL III: ICD-10-PCS | Mod: PBBFAC,,, | Performed by: FAMILY MEDICINE

## 2020-03-13 PROCEDURE — 84443 ASSAY THYROID STIM HORMONE: CPT

## 2020-03-13 PROCEDURE — 80061 LIPID PANEL: CPT

## 2020-03-13 PROCEDURE — 99396 PR PREVENTIVE VISIT,EST,40-64: ICD-10-PCS | Mod: S$GLB,,, | Performed by: FAMILY MEDICINE

## 2020-03-13 PROCEDURE — 36415 COLL VENOUS BLD VENIPUNCTURE: CPT

## 2020-03-13 PROCEDURE — 99999 PR PBB SHADOW E&M-EST. PATIENT-LVL III: CPT | Mod: PBBFAC,,, | Performed by: FAMILY MEDICINE

## 2020-03-13 PROCEDURE — 3044F HG A1C LEVEL LT 7.0%: CPT | Mod: CPTII,S$GLB,, | Performed by: FAMILY MEDICINE

## 2020-03-13 PROCEDURE — 80053 COMPREHEN METABOLIC PANEL: CPT

## 2020-03-13 PROCEDURE — 3044F PR MOST RECENT HEMOGLOBIN A1C LEVEL <7.0%: ICD-10-PCS | Mod: CPTII,S$GLB,, | Performed by: FAMILY MEDICINE

## 2020-03-13 PROCEDURE — 83036 HEMOGLOBIN GLYCOSYLATED A1C: CPT

## 2020-03-13 PROCEDURE — 99396 PREV VISIT EST AGE 40-64: CPT | Mod: S$GLB,,, | Performed by: FAMILY MEDICINE

## 2020-03-13 NOTE — PROGRESS NOTES
"CHIEF COMPLAINT:  Annual    HISTORY OF PRESENT ILLNESS: The patient is a 49 year-old white male.  She  He is currently on metformin and Crestor.     The patient has a history of diabetes.  Recent blood sugars have been less than 120.  There have been no episodes of hypoglycemia.    The patient has a history of stable hyperlipidemia on current medications.  The patient denies chest pain or shortness of breath today.  The patient denies muscle aches or myalgias suggestive of myositis.    REVIEW OF SYSTEMS:   GENERAL: No fever, chills, weight loss.   SKIN: No rashes, itching or changes in color or texture of skin.   HEAD: No headaches or recent head trauma.   EYES: Visual acuity fine. No photophobia, ocular pain or diplopia.   EARS: Denies ear pain, discharge or vertigo.   NOSE: No loss of smell, no epistaxis or postnasal drip.   MOUTH & THROAT: No hoarseness or change in voice. No excessive gum bleeding.   NODES: Denies swollen glands.   CHEST: Denies JASMINE, cyanosis, wheezing, cough and sputum production.   CARDIOVASCULAR: Denies chest pain, PND, orthopnea or reduced exercise tolerance.   ABDOMEN: Appetite fine. No weight loss. Denies diarrhea, abdominal pain, hematemesis or blood in stool.   URINARY: No flank pain, dysuria or hematuria.   PERIPHERAL VASCULAR: No claudication or cyanosis.   MUSCULOSKELETAL: No joint stiffness or swelling. Denies back pain.   NEUROLOGIC: No history of seizures, paralysis, alteration of gait or coordination.     SOCIAL HISTORY: The patient does not smoke. The patient consumes alcohol socially. The patient is single. He is an     PHYSICAL EXAMINATION:   Blood pressure 108/78, pulse 101, height 5' 10" (1.778 m), weight 95.2 kg (209 lb 14.1 oz), SpO2 95 %.    APPEARANCE: Well nourished, well developed, in no acute distress.   HEAD: Normocephalic, atraumatic.   EYES: PERRL. EOMI. Conjunctivae without injection and anicteric   EARS: TM's intact. Light reflex normal. No " retraction or perforation.   NOSE: Mucosa pink. Airway clear.   MOUTH & THROAT: No tonsillar enlargement. No pharyngeal erythema or exudate. No stridor.   NECK: Supple.   NODES: No cervical, axillary or inguinal lymph node enlargement.   CHEST: Lungs clear to auscultation. No retractions are noted. No rales or rhonchi are present.   CARDIOVASCULAR: Normal S1, S2. No rubs, murmurs or gallops.   ABDOMEN: Bowel sounds normal. Not distended. Soft. No tenderness or masses. No ascites is noted.   MUSCULOSKELETAL: There is no clubbing, cyanosis, or edema of the extremities x4. There is full range of motion of the lumbar spine. There is full range of motion of the extremities x4. There is no deformity noted.   NEUROLOGIC:   Normal speech development.   Hearing normal.   Normal gait.   Motor and sensory exams grossly normal.   PSYCHIATRIC: Patient is alert and oriented x3. Thought processes are all normal. There is no homicidality. There is no suicidality. There is no evidence of psychosis.     LABORATORY/RADIOLOGY: Chart reviewed.      ASSESSMENT:   Annual  Type 2 diabetes   Hyperlipidemia   Family history DM  Family history of heart disease    PLAN:   We will follow-up blood work which we expect to be normal.  Return to clinic in 6 months with blood work prior

## 2020-03-14 LAB
CHOLEST SERPL-MCNC: 184 MG/DL (ref 120–199)
CHOLEST/HDLC SERPL: 3.5 {RATIO} (ref 2–5)
ESTIMATED AVG GLUCOSE: 157 MG/DL (ref 68–131)
HBA1C MFR BLD HPLC: 7.1 % (ref 4–5.6)
HDLC SERPL-MCNC: 52 MG/DL (ref 40–75)
HDLC SERPL: 28.3 % (ref 20–50)
LDLC SERPL CALC-MCNC: 111.4 MG/DL (ref 63–159)
NONHDLC SERPL-MCNC: 132 MG/DL
TRIGL SERPL-MCNC: 103 MG/DL (ref 30–150)

## 2020-03-30 DIAGNOSIS — E78.01 HYPERLIPIDEMIA TYPE II: ICD-10-CM

## 2020-03-30 DIAGNOSIS — E11.9 TYPE 2 DIABETES MELLITUS WITHOUT COMPLICATION, WITHOUT LONG-TERM CURRENT USE OF INSULIN: ICD-10-CM

## 2020-03-30 DIAGNOSIS — Z00.00 PREVENTATIVE HEALTH CARE: ICD-10-CM

## 2020-03-30 RX ORDER — ROSUVASTATIN CALCIUM 5 MG/1
5 TABLET, COATED ORAL DAILY
Qty: 90 TABLET | Refills: 1 | Status: SHIPPED | OUTPATIENT
Start: 2020-03-30 | End: 2020-09-30 | Stop reason: SDUPTHER

## 2020-03-31 ENCOUNTER — PATIENT MESSAGE (OUTPATIENT)
Dept: ADMINISTRATIVE | Facility: OTHER | Age: 50
End: 2020-03-31

## 2020-03-31 DIAGNOSIS — E11.9 TYPE 2 DIABETES MELLITUS: ICD-10-CM

## 2020-04-15 ENCOUNTER — PATIENT OUTREACH (OUTPATIENT)
Dept: ADMINISTRATIVE | Facility: HOSPITAL | Age: 50
End: 2020-04-15

## 2020-09-04 DIAGNOSIS — E11.9 TYPE 2 DIABETES MELLITUS WITHOUT COMPLICATION, UNSPECIFIED WHETHER LONG TERM INSULIN USE: ICD-10-CM

## 2020-09-21 ENCOUNTER — OFFICE VISIT (OUTPATIENT)
Dept: INTERNAL MEDICINE | Facility: CLINIC | Age: 50
End: 2020-09-21
Attending: FAMILY MEDICINE
Payer: COMMERCIAL

## 2020-09-21 ENCOUNTER — LAB VISIT (OUTPATIENT)
Dept: LAB | Facility: OTHER | Age: 50
End: 2020-09-21
Attending: FAMILY MEDICINE
Payer: COMMERCIAL

## 2020-09-21 VITALS
HEART RATE: 83 BPM | SYSTOLIC BLOOD PRESSURE: 114 MMHG | DIASTOLIC BLOOD PRESSURE: 76 MMHG | HEIGHT: 70 IN | BODY MASS INDEX: 29.32 KG/M2 | WEIGHT: 204.81 LBS | OXYGEN SATURATION: 97 %

## 2020-09-21 DIAGNOSIS — E11.9 TYPE 2 DIABETES MELLITUS WITHOUT COMPLICATION, WITHOUT LONG-TERM CURRENT USE OF INSULIN: ICD-10-CM

## 2020-09-21 DIAGNOSIS — E78.01 HYPERLIPIDEMIA TYPE II: Primary | ICD-10-CM

## 2020-09-21 LAB
ESTIMATED AVG GLUCOSE: 140 MG/DL (ref 68–131)
HBA1C MFR BLD HPLC: 6.5 % (ref 4–5.6)

## 2020-09-21 PROCEDURE — 3008F BODY MASS INDEX DOCD: CPT | Mod: CPTII,S$GLB,, | Performed by: FAMILY MEDICINE

## 2020-09-21 PROCEDURE — 99999 PR PBB SHADOW E&M-EST. PATIENT-LVL III: ICD-10-PCS | Mod: PBBFAC,,, | Performed by: FAMILY MEDICINE

## 2020-09-21 PROCEDURE — 83036 HEMOGLOBIN GLYCOSYLATED A1C: CPT

## 2020-09-21 PROCEDURE — 3044F HG A1C LEVEL LT 7.0%: CPT | Mod: CPTII,S$GLB,, | Performed by: FAMILY MEDICINE

## 2020-09-21 PROCEDURE — 36415 COLL VENOUS BLD VENIPUNCTURE: CPT

## 2020-09-21 PROCEDURE — 99999 PR PBB SHADOW E&M-EST. PATIENT-LVL III: CPT | Mod: PBBFAC,,, | Performed by: FAMILY MEDICINE

## 2020-09-21 PROCEDURE — 3008F PR BODY MASS INDEX (BMI) DOCUMENTED: ICD-10-PCS | Mod: CPTII,S$GLB,, | Performed by: FAMILY MEDICINE

## 2020-09-21 PROCEDURE — 99214 OFFICE O/P EST MOD 30 MIN: CPT | Mod: S$GLB,,, | Performed by: FAMILY MEDICINE

## 2020-09-21 PROCEDURE — 99214 PR OFFICE/OUTPT VISIT, EST, LEVL IV, 30-39 MIN: ICD-10-PCS | Mod: S$GLB,,, | Performed by: FAMILY MEDICINE

## 2020-09-21 PROCEDURE — 3044F PR MOST RECENT HEMOGLOBIN A1C LEVEL <7.0%: ICD-10-PCS | Mod: CPTII,S$GLB,, | Performed by: FAMILY MEDICINE

## 2020-09-21 NOTE — PROGRESS NOTES
"CHIEF COMPLAINT:  Diabetes follow-up    HISTORY OF PRESENT ILLNESS: The patient is a 50 year-old white male.  She  He is currently on metformin and Crestor.     The patient has a history of diabetes.  Recent blood sugars have been less than 200.  There have been no episodes of hypoglycemia.    The patient has a history of stable hyperlipidemia on current medications.  The patient denies chest pain or shortness of breath today.  The patient denies muscle aches or myalgias suggestive of myositis.    REVIEW OF SYSTEMS:   GENERAL: No fever, chills, weight loss.   SKIN: No rashes, itching or changes in color or texture of skin.   HEAD: No headaches or recent head trauma.   EYES: Visual acuity fine. No photophobia, ocular pain or diplopia.   EARS: Denies ear pain, discharge or vertigo.   NOSE: No loss of smell, no epistaxis or postnasal drip.   MOUTH & THROAT: No hoarseness or change in voice. No excessive gum bleeding.   NODES: Denies swollen glands.   CHEST: Denies JASMINE, cyanosis, wheezing, cough and sputum production.   CARDIOVASCULAR: Denies chest pain, PND, orthopnea or reduced exercise tolerance.   ABDOMEN: Appetite fine. No weight loss. Denies diarrhea, abdominal pain, hematemesis or blood in stool.   URINARY: No flank pain, dysuria or hematuria.   PERIPHERAL VASCULAR: No claudication or cyanosis.   MUSCULOSKELETAL: No joint stiffness or swelling. Denies back pain.   NEUROLOGIC: No history of seizures, paralysis, alteration of gait or coordination.     SOCIAL HISTORY: The patient does not smoke. The patient consumes alcohol socially. The patient is single. He is an     PHYSICAL EXAMINATION:   Blood pressure 114/76, pulse 83, height 5' 10" (1.778 m), weight 92.9 kg (204 lb 12.9 oz), SpO2 97 %.    APPEARANCE: Well nourished, well developed, in no acute distress.   HEAD: Normocephalic, atraumatic.   EYES: PERRL. EOMI. Conjunctivae without injection and anicteric   EARS: TM's intact. Light reflex normal. " No retraction or perforation.   NOSE: Mucosa pink. Airway clear.   MOUTH & THROAT: No tonsillar enlargement. No pharyngeal erythema or exudate. No stridor.   NECK: Supple.   NODES: No cervical, axillary or inguinal lymph node enlargement.   CHEST: Lungs clear to auscultation. No retractions are noted. No rales or rhonchi are present.   CARDIOVASCULAR: Normal S1, S2. No rubs, murmurs or gallops.   ABDOMEN: Bowel sounds normal. Not distended. Soft. No tenderness or masses. No ascites is noted.   MUSCULOSKELETAL: There is no clubbing, cyanosis, or edema of the extremities x4. There is full range of motion of the lumbar spine. There is full range of motion of the extremities x4. There is no deformity noted.   NEUROLOGIC:   Normal speech development.   Hearing normal.   Normal gait.   Motor and sensory exams grossly normal.   PSYCHIATRIC: Patient is alert and oriented x3. Thought processes are all normal. There is no homicidality. There is no suicidality. There is no evidence of psychosis.     LABORATORY/RADIOLOGY: Chart reviewed.      ASSESSMENT:   Type 2 diabetes, condition is well controlled on current medication regimen  Hyperlipidemia   Family history DM  Family history of heart disease    PLAN:   We will follow-up blood work which we expect to be normal.  Return to clinic in 6 months with blood work prior        Answers for HPI/ROS submitted by the patient on 9/14/2020   activity change: No  unexpected weight change: No  neck pain: No  hearing loss: No  rhinorrhea: No  trouble swallowing: No  eye discharge: No  visual disturbance: No  chest tightness: No  wheezing: No  chest pain: No  palpitations: No  blood in stool: No  constipation: No  vomiting: No  diarrhea: No  polydipsia: No  polyuria: No  difficulty urinating: No  urgency: No  hematuria: No  joint swelling: No  headaches: No  weakness: No  confusion: No  dysphoric mood: No

## 2020-09-22 ENCOUNTER — IMMUNIZATION (OUTPATIENT)
Dept: PHARMACY | Facility: CLINIC | Age: 50
End: 2020-09-22
Payer: COMMERCIAL

## 2020-09-30 DIAGNOSIS — Z00.00 PREVENTATIVE HEALTH CARE: ICD-10-CM

## 2020-09-30 DIAGNOSIS — E78.01 HYPERLIPIDEMIA TYPE II: ICD-10-CM

## 2020-09-30 DIAGNOSIS — E11.9 TYPE 2 DIABETES MELLITUS WITHOUT COMPLICATION, WITHOUT LONG-TERM CURRENT USE OF INSULIN: ICD-10-CM

## 2020-10-01 ENCOUNTER — PATIENT OUTREACH (OUTPATIENT)
Dept: ADMINISTRATIVE | Facility: OTHER | Age: 50
End: 2020-10-01

## 2020-10-01 RX ORDER — ROSUVASTATIN CALCIUM 5 MG/1
5 TABLET, COATED ORAL DAILY
Qty: 90 TABLET | Refills: 1 | Status: SHIPPED | OUTPATIENT
Start: 2020-10-01 | End: 2021-01-10 | Stop reason: SDUPTHER

## 2020-10-01 NOTE — PROGRESS NOTES
LINKS immunization registry not responding  Care Everywhere updated  Health Maintenance updated  Chart reviewed for overdue Proactive [facility] ([facility]) health maintenance testing (CRS, Breast Ca, Diabetic Eye Exam)   Orders entered:N/A

## 2020-10-02 ENCOUNTER — PATIENT MESSAGE (OUTPATIENT)
Dept: INTERNAL MEDICINE | Facility: CLINIC | Age: 50
End: 2020-10-02

## 2020-10-02 ENCOUNTER — OFFICE VISIT (OUTPATIENT)
Dept: PODIATRY | Facility: CLINIC | Age: 50
End: 2020-10-02
Payer: COMMERCIAL

## 2020-10-02 VITALS
WEIGHT: 204 LBS | RESPIRATION RATE: 18 BRPM | HEART RATE: 75 BPM | SYSTOLIC BLOOD PRESSURE: 126 MMHG | HEIGHT: 70 IN | BODY MASS INDEX: 29.2 KG/M2 | DIASTOLIC BLOOD PRESSURE: 79 MMHG

## 2020-10-02 DIAGNOSIS — E11.9 TYPE 2 DIABETES MELLITUS WITHOUT COMPLICATION, WITHOUT LONG-TERM CURRENT USE OF INSULIN: Primary | ICD-10-CM

## 2020-10-02 DIAGNOSIS — L84 CORN OR CALLUS: ICD-10-CM

## 2020-10-02 PROCEDURE — 99213 PR OFFICE/OUTPT VISIT, EST, LEVL III, 20-29 MIN: ICD-10-PCS | Mod: S$GLB,,, | Performed by: PODIATRIST

## 2020-10-02 PROCEDURE — 3044F HG A1C LEVEL LT 7.0%: CPT | Mod: CPTII,S$GLB,, | Performed by: PODIATRIST

## 2020-10-02 PROCEDURE — 99213 OFFICE O/P EST LOW 20 MIN: CPT | Mod: S$GLB,,, | Performed by: PODIATRIST

## 2020-10-02 PROCEDURE — 3008F BODY MASS INDEX DOCD: CPT | Mod: CPTII,S$GLB,, | Performed by: PODIATRIST

## 2020-10-02 PROCEDURE — 3044F PR MOST RECENT HEMOGLOBIN A1C LEVEL <7.0%: ICD-10-PCS | Mod: CPTII,S$GLB,, | Performed by: PODIATRIST

## 2020-10-02 PROCEDURE — 99999 PR PBB SHADOW E&M-EST. PATIENT-LVL III: ICD-10-PCS | Mod: PBBFAC,,, | Performed by: PODIATRIST

## 2020-10-02 PROCEDURE — 3008F PR BODY MASS INDEX (BMI) DOCUMENTED: ICD-10-PCS | Mod: CPTII,S$GLB,, | Performed by: PODIATRIST

## 2020-10-02 PROCEDURE — 99999 PR PBB SHADOW E&M-EST. PATIENT-LVL III: CPT | Mod: PBBFAC,,, | Performed by: PODIATRIST

## 2020-10-02 NOTE — PROGRESS NOTES
Subjective:      Patient ID: Osmel Nair is a 50 y.o. male.    Chief Complaint: PCP (Kam Phipps MD 9/21/20) and Diabetic Foot Exam    Osmel is a 50 y.o. male who presents to the clinic upon referral from Dr. Eli parsons. provider found  for evaluation and treatment of diabetic feet. Osmel has a past medical history of Diabetes mellitus, type 2 (07/2016) and Lyme disease (2016). Patient relates no major problem with feet. Only complaints today consist of yearly comprehensive diabetic  foot examination  .    PCP: Kam Phipps MD    Date Last Seen by PCP:   Chief Complaint   Patient presents with    PCP     Kam Phipps MD 9/21/20    Diabetic Foot Exam         Current shoe gear: Casual shoes    Hemoglobin A1C   Date Value Ref Range Status   09/21/2020 6.5 (H) 4.0 - 5.6 % Final     Comment:     ADA Screening Guidelines:  5.7-6.4%  Consistent with prediabetes  >or=6.5%  Consistent with diabetes  High levels of fetal hemoglobin interfere with the HbA1C  assay. Heterozygous hemoglobin variants (HbS, HgC, etc)do  not significantly interfere with this assay.   However, presence of multiple variants may affect accuracy.     03/13/2020 7.1 (H) 4.0 - 5.6 % Final     Comment:     ADA Screening Guidelines:  5.7-6.4%  Consistent with prediabetes  >or=6.5%  Consistent with diabetes  High levels of fetal hemoglobin interfere with the HbA1C  assay. Heterozygous hemoglobin variants (HbS, HgC, etc)do  not significantly interfere with this assay.   However, presence of multiple variants may affect accuracy.     09/27/2019 6.6 (H) 4.0 - 5.6 % Final     Comment:     ADA Screening Guidelines:  5.7-6.4%  Consistent with prediabetes  >or=6.5%  Consistent with diabetes  High levels of fetal hemoglobin interfere with the HbA1C  assay. Heterozygous hemoglobin variants (HbS, HgC, etc)do  not significantly interfere with this assay.   However, presence of multiple variants may affect accuracy.    "          Review of Systems   Constitution: Negative for chills, decreased appetite and fever.   Cardiovascular: Negative for leg swelling.   Skin: Positive for dry skin.   Musculoskeletal: Negative for arthritis, joint pain, joint swelling and myalgias.   Gastrointestinal: Negative for nausea and vomiting.   Neurological: Negative for loss of balance, numbness and paresthesias.           Objective:       Vitals:    10/02/20 0913   BP: 126/79   Pulse: 75   Resp: 18   Weight: 92.5 kg (204 lb)   Height: 5' 10" (1.778 m)   PainSc: 0-No pain        Physical Exam  Vitals signs and nursing note reviewed.   Constitutional:       General: He is not in acute distress.     Appearance: He is well-developed. He is not toxic-appearing or diaphoretic.      Comments: alert and oriented x 3.    Cardiovascular:      Pulses:           Dorsalis pedis pulses are 2+ on the right side and 2+ on the left side.        Posterior tibial pulses are 2+ on the right side and 2+ on the left side.      Comments:  Capillary refill time is within normal limits. Digital hair present.   Pulmonary:      Effort: No respiratory distress.   Musculoskeletal:         General: No deformity.      Right ankle: No tenderness. No lateral malleolus, no medial malleolus, no AITFL, no CF ligament and no posterior TFL tenderness found. Achilles tendon exhibits no pain, no defect and normal Buckley's test results.      Left ankle: No tenderness. No lateral malleolus, no medial malleolus, no AITFL, no CF ligament and no posterior TFL tenderness found. Achilles tendon exhibits no pain, no defect and normal Buckley's test results.      Right foot: No tenderness or bony tenderness.      Left foot: No tenderness or bony tenderness.      Comments: Adequate joint range of motion without pain, limitation, nor crepitation Bilateral feet and ankle joints. Muscle strength is 5/5 in all groups bilaterally.           Feet:      Right foot:      Protective Sensation: 5 sites " tested. 5 sites sensed.      Left foot:      Protective Sensation: 5 sites tested. 5 sites sensed.   Lymphadenopathy:      Comments: No lymphatic streaking     Skin:     General: Skin is warm and dry.      Coloration: Skin is not pale.      Findings: No rash.      Nails: There is no clubbing.        Comments: Skin is of normal turgor.   Normal temperature gradient.  Examination of the skin reveals no evidence of significant rashes, open lesions, suspicious appearing nevi or other concerning lesions.        Toenails 1-5 bilaterally are neatly trimmed; of normal color and thickness    Posterior calcaneal rim Focal hyperkeratotic lesion consisting entirely of hyperkeratotic tissue without open skin, drainage, pus, fluctuance, malodor, or signs of infection:         Neurological:      Sensory: No sensory deficit.      Motor: No atrophy.      Comments: Light touch present     Psychiatric:         Attention and Perception: He is attentive.         Mood and Affect: Mood is not anxious. Affect is not inappropriate.         Speech: He is communicative. Speech is not slurred.         Behavior: Behavior is not combative.               Assessment:       Encounter Diagnoses   Name Primary?    Type 2 diabetes mellitus without complication, without long-term current use of insulin Yes    Corn or callus          Plan:       Osmel was seen today for pcp and diabetic foot exam.    Diagnoses and all orders for this visit:    Type 2 diabetes mellitus without complication, without long-term current use of insulin    Corn or callus      I counseled the patient on his conditions, their implications and medical management.      - Shoe inspection. Diabetic Foot Education. Patient reminded of the importance of good nutrition and blood sugar control to help prevent podiatric complications of diabetes. Patient instructed on proper foot hygeine. We discussed wearing proper shoe gear, daily foot inspections, never walking without protective  shoe gear, caution putting sharp instruments to feet     - Discussed DM foot care:  Wear comfortable, proper fitting shoes. Wash feet daily. Dry well. After drying, apply moisturizer to feet (no lotion to webspaces). Inspect feet daily for skin breaks, blisters, swelling, or redness. Wear cotton socks (preferably white)  Change socks every day. Do NOT walk barefoot. Do NOT use heating pads or warm/hot water soaks     - Discussed importance of daily moisturizer to the feet such as Gold bonds diabetic foot cream    - Patient is low risk for developing lower extremity issues secondary to diabetes. I recommend continued yearly diabetic foot examinations.     - Patients PCP can perform yearly foot checks . Currently  patient has no pedal manifestations of DM    - Patients with out pedal manifestations of DM, do not qualify for nail/callus trimming     - RTC in  PRN     .

## 2020-12-07 ENCOUNTER — OFFICE VISIT (OUTPATIENT)
Dept: INTERNAL MEDICINE | Facility: CLINIC | Age: 50
End: 2020-12-07
Attending: FAMILY MEDICINE
Payer: COMMERCIAL

## 2020-12-07 VITALS
DIASTOLIC BLOOD PRESSURE: 76 MMHG | HEART RATE: 71 BPM | SYSTOLIC BLOOD PRESSURE: 116 MMHG | BODY MASS INDEX: 30.08 KG/M2 | WEIGHT: 210.13 LBS | OXYGEN SATURATION: 98 % | HEIGHT: 70 IN

## 2020-12-07 DIAGNOSIS — E11.9 TYPE 2 DIABETES MELLITUS WITHOUT COMPLICATION, WITHOUT LONG-TERM CURRENT USE OF INSULIN: Primary | ICD-10-CM

## 2020-12-07 LAB
ALBUMIN/CREAT UR: 3.9 UG/MG (ref 0–30)
CREAT UR-MCNC: 76 MG/DL (ref 23–375)
MICROALBUMIN UR DL<=1MG/L-MCNC: 3 UG/ML

## 2020-12-07 PROCEDURE — 99999 PR PBB SHADOW E&M-EST. PATIENT-LVL III: ICD-10-PCS | Mod: PBBFAC,,, | Performed by: FAMILY MEDICINE

## 2020-12-07 PROCEDURE — 99214 OFFICE O/P EST MOD 30 MIN: CPT | Mod: S$GLB,,, | Performed by: FAMILY MEDICINE

## 2020-12-07 PROCEDURE — 99214 PR OFFICE/OUTPT VISIT, EST, LEVL IV, 30-39 MIN: ICD-10-PCS | Mod: S$GLB,,, | Performed by: FAMILY MEDICINE

## 2020-12-07 PROCEDURE — 99999 PR PBB SHADOW E&M-EST. PATIENT-LVL III: CPT | Mod: PBBFAC,,, | Performed by: FAMILY MEDICINE

## 2020-12-07 PROCEDURE — 3044F PR MOST RECENT HEMOGLOBIN A1C LEVEL <7.0%: ICD-10-PCS | Mod: CPTII,S$GLB,, | Performed by: FAMILY MEDICINE

## 2020-12-07 PROCEDURE — 3044F HG A1C LEVEL LT 7.0%: CPT | Mod: CPTII,S$GLB,, | Performed by: FAMILY MEDICINE

## 2020-12-07 PROCEDURE — 3008F BODY MASS INDEX DOCD: CPT | Mod: CPTII,S$GLB,, | Performed by: FAMILY MEDICINE

## 2020-12-07 PROCEDURE — 82043 UR ALBUMIN QUANTITATIVE: CPT

## 2020-12-07 PROCEDURE — 1126F PR PAIN SEVERITY QUANTIFIED, NO PAIN PRESENT: ICD-10-PCS | Mod: S$GLB,,, | Performed by: FAMILY MEDICINE

## 2020-12-07 PROCEDURE — 3008F PR BODY MASS INDEX (BMI) DOCUMENTED: ICD-10-PCS | Mod: CPTII,S$GLB,, | Performed by: FAMILY MEDICINE

## 2020-12-07 PROCEDURE — 1126F AMNT PAIN NOTED NONE PRSNT: CPT | Mod: S$GLB,,, | Performed by: FAMILY MEDICINE

## 2020-12-07 NOTE — PROGRESS NOTES
"CHIEF COMPLAINT:  Diabetes follow-up    HISTORY OF PRESENT ILLNESS: The patient is a 50 year-old white male.  She  He is currently on metformin and Crestor.     The patient has a history of diabetes.  Recent blood sugars have been less than 200.  There have been no episodes of hypoglycemia.    The patient has a history of stable hyperlipidemia on current medications.  The patient denies chest pain or shortness of breath today.  The patient denies muscle aches or myalgias suggestive of myositis.    REVIEW OF SYSTEMS:   GENERAL: No fever, chills, weight loss.   SKIN: No rashes, itching or changes in color or texture of skin.   HEAD: No headaches or recent head trauma.   EYES: Visual acuity fine. No photophobia, ocular pain or diplopia.   EARS: Denies ear pain, discharge or vertigo.   NOSE: No loss of smell, no epistaxis or postnasal drip.   MOUTH & THROAT: No hoarseness or change in voice. No excessive gum bleeding.   NODES: Denies swollen glands.   CHEST: Denies JASMINE, cyanosis, wheezing, cough and sputum production.   CARDIOVASCULAR: Denies chest pain, PND, orthopnea or reduced exercise tolerance.   ABDOMEN: Appetite fine. No weight loss. Denies diarrhea, abdominal pain, hematemesis or blood in stool.   URINARY: No flank pain, dysuria or hematuria.   PERIPHERAL VASCULAR: No claudication or cyanosis.   MUSCULOSKELETAL: No joint stiffness or swelling. Denies back pain.   NEUROLOGIC: No history of seizures, paralysis, alteration of gait or coordination.     SOCIAL HISTORY: The patient does not smoke. The patient consumes alcohol socially. The patient is single. He is an     PHYSICAL EXAMINATION:   Blood pressure 116/76, pulse 71, height 5' 10" (1.778 m), weight 95.3 kg (210 lb 1.6 oz), SpO2 98 %.    APPEARANCE: Well nourished, well developed, in no acute distress.   HEAD: Normocephalic, atraumatic.   EYES: PERRL. EOMI. Conjunctivae without injection and anicteric   EARS: TM's intact. Light reflex normal. No " retraction or perforation.   NOSE: Mucosa pink. Airway clear.   MOUTH & THROAT: No tonsillar enlargement. No pharyngeal erythema or exudate. No stridor.   NECK: Supple.   NODES: No cervical, axillary or inguinal lymph node enlargement.   CHEST: Lungs clear to auscultation. No retractions are noted. No rales or rhonchi are present.   CARDIOVASCULAR: Normal S1, S2. No rubs, murmurs or gallops.   ABDOMEN: Bowel sounds normal. Not distended. Soft. No tenderness or masses. No ascites is noted.   MUSCULOSKELETAL: There is no clubbing, cyanosis, or edema of the extremities x4. There is full range of motion of the lumbar spine. There is full range of motion of the extremities x4. There is no deformity noted.   NEUROLOGIC:   Normal speech development.   Hearing normal.   Normal gait.   Motor and sensory exams grossly normal.   PSYCHIATRIC: Patient is alert and oriented x3. Thought processes are all normal. There is no homicidality. There is no suicidality. There is no evidence of psychosis.     LABORATORY/RADIOLOGY: Chart reviewed.      ASSESSMENT:   Type 2 diabetes, condition is well controlled on current medication regimen  Hyperlipidemia   Family history DM  Family history of heart disease    PLAN:   We will follow-up micro alb which we expect to be normal.  Return to clinic in 6 months with blood work prior

## 2021-01-10 DIAGNOSIS — E78.01 HYPERLIPIDEMIA TYPE II: ICD-10-CM

## 2021-01-10 DIAGNOSIS — Z00.00 PREVENTATIVE HEALTH CARE: ICD-10-CM

## 2021-01-10 DIAGNOSIS — E11.9 TYPE 2 DIABETES MELLITUS WITHOUT COMPLICATION, WITHOUT LONG-TERM CURRENT USE OF INSULIN: ICD-10-CM

## 2021-01-11 RX ORDER — METFORMIN HYDROCHLORIDE 500 MG/1
500 TABLET ORAL 2 TIMES DAILY WITH MEALS
Qty: 180 TABLET | Refills: 3 | Status: SHIPPED | OUTPATIENT
Start: 2021-01-11 | End: 2021-09-27 | Stop reason: SDUPTHER

## 2021-01-11 RX ORDER — ROSUVASTATIN CALCIUM 5 MG/1
5 TABLET, COATED ORAL DAILY
Qty: 90 TABLET | Refills: 1 | Status: SHIPPED | OUTPATIENT
Start: 2021-01-11 | End: 2021-07-05 | Stop reason: SDUPTHER

## 2021-03-13 ENCOUNTER — IMMUNIZATION (OUTPATIENT)
Dept: PRIMARY CARE CLINIC | Facility: CLINIC | Age: 51
End: 2021-03-13
Payer: COMMERCIAL

## 2021-03-13 DIAGNOSIS — Z23 NEED FOR VACCINATION: Primary | ICD-10-CM

## 2021-03-13 PROCEDURE — 91300 PR SARS-COV- 2 COVID-19 VACCINE, NO PRSV, 30MCG/0.3ML, IM: CPT | Mod: S$GLB,,, | Performed by: INTERNAL MEDICINE

## 2021-03-13 PROCEDURE — 91300 PR SARS-COV- 2 COVID-19 VACCINE, NO PRSV, 30MCG/0.3ML, IM: ICD-10-PCS | Mod: S$GLB,,, | Performed by: INTERNAL MEDICINE

## 2021-03-13 PROCEDURE — 0001A PR IMMUNIZ ADMIN, SARS-COV-2 COVID-19 VACC, 30MCG/0.3ML, 1ST DOSE: ICD-10-PCS | Mod: CV19,S$GLB,, | Performed by: INTERNAL MEDICINE

## 2021-03-13 PROCEDURE — 0001A PR IMMUNIZ ADMIN, SARS-COV-2 COVID-19 VACC, 30MCG/0.3ML, 1ST DOSE: CPT | Mod: CV19,S$GLB,, | Performed by: INTERNAL MEDICINE

## 2021-03-13 RX ADMIN — Medication 0.3 ML: at 09:03

## 2021-03-19 DIAGNOSIS — E11.9 TYPE 2 DIABETES MELLITUS WITHOUT COMPLICATION: ICD-10-CM

## 2021-03-31 DIAGNOSIS — E11.9 TYPE 2 DIABETES MELLITUS WITHOUT COMPLICATION: ICD-10-CM

## 2021-04-03 ENCOUNTER — IMMUNIZATION (OUTPATIENT)
Dept: PRIMARY CARE CLINIC | Facility: CLINIC | Age: 51
End: 2021-04-03
Payer: COMMERCIAL

## 2021-04-03 DIAGNOSIS — Z23 NEED FOR VACCINATION: Primary | ICD-10-CM

## 2021-04-03 PROCEDURE — 0002A PR IMMUNIZ ADMIN, SARS-COV-2 COVID-19 VACC, 30MCG/0.3ML, 2ND DOSE: ICD-10-PCS | Mod: CV19,S$GLB,, | Performed by: INTERNAL MEDICINE

## 2021-04-03 PROCEDURE — 91300 PR SARS-COV- 2 COVID-19 VACCINE, NO PRSV, 30MCG/0.3ML, IM: CPT | Mod: S$GLB,,, | Performed by: INTERNAL MEDICINE

## 2021-04-03 PROCEDURE — 91300 PR SARS-COV- 2 COVID-19 VACCINE, NO PRSV, 30MCG/0.3ML, IM: ICD-10-PCS | Mod: S$GLB,,, | Performed by: INTERNAL MEDICINE

## 2021-04-03 PROCEDURE — 0002A PR IMMUNIZ ADMIN, SARS-COV-2 COVID-19 VACC, 30MCG/0.3ML, 2ND DOSE: CPT | Mod: CV19,S$GLB,, | Performed by: INTERNAL MEDICINE

## 2021-04-03 RX ADMIN — Medication 0.3 ML: at 09:04

## 2021-04-05 ENCOUNTER — PATIENT MESSAGE (OUTPATIENT)
Dept: ADMINISTRATIVE | Facility: HOSPITAL | Age: 51
End: 2021-04-05

## 2021-04-08 ENCOUNTER — LAB VISIT (OUTPATIENT)
Dept: LAB | Facility: HOSPITAL | Age: 51
End: 2021-04-08
Attending: FAMILY MEDICINE
Payer: COMMERCIAL

## 2021-04-08 DIAGNOSIS — E11.9 TYPE 2 DIABETES MELLITUS WITHOUT COMPLICATION: ICD-10-CM

## 2021-04-08 LAB
CHOLEST SERPL-MCNC: 164 MG/DL (ref 120–199)
CHOLEST/HDLC SERPL: 4 {RATIO} (ref 2–5)
ESTIMATED AVG GLUCOSE: 146 MG/DL (ref 68–131)
HBA1C MFR BLD: 6.7 % (ref 4–5.6)
HDLC SERPL-MCNC: 41 MG/DL (ref 40–75)
HDLC SERPL: 25 % (ref 20–50)
LDLC SERPL CALC-MCNC: 108.2 MG/DL (ref 63–159)
NONHDLC SERPL-MCNC: 123 MG/DL
TRIGL SERPL-MCNC: 74 MG/DL (ref 30–150)

## 2021-04-08 PROCEDURE — 36415 COLL VENOUS BLD VENIPUNCTURE: CPT | Performed by: FAMILY MEDICINE

## 2021-04-08 PROCEDURE — 83036 HEMOGLOBIN GLYCOSYLATED A1C: CPT | Performed by: FAMILY MEDICINE

## 2021-04-08 PROCEDURE — 80061 LIPID PANEL: CPT | Performed by: FAMILY MEDICINE

## 2021-04-23 ENCOUNTER — PATIENT OUTREACH (OUTPATIENT)
Dept: ADMINISTRATIVE | Facility: HOSPITAL | Age: 51
End: 2021-04-23

## 2021-04-23 ENCOUNTER — PATIENT MESSAGE (OUTPATIENT)
Dept: ADMINISTRATIVE | Facility: HOSPITAL | Age: 51
End: 2021-04-23

## 2021-04-23 DIAGNOSIS — E11.9 DIABETES MELLITUS WITHOUT COMPLICATION: Primary | ICD-10-CM

## 2021-05-03 ENCOUNTER — PATIENT OUTREACH (OUTPATIENT)
Dept: ADMINISTRATIVE | Facility: HOSPITAL | Age: 51
End: 2021-05-03

## 2021-07-05 DIAGNOSIS — Z00.00 PREVENTATIVE HEALTH CARE: ICD-10-CM

## 2021-07-05 DIAGNOSIS — E78.01 HYPERLIPIDEMIA TYPE II: ICD-10-CM

## 2021-07-05 DIAGNOSIS — E11.9 TYPE 2 DIABETES MELLITUS WITHOUT COMPLICATION, WITHOUT LONG-TERM CURRENT USE OF INSULIN: ICD-10-CM

## 2021-07-06 RX ORDER — ROSUVASTATIN CALCIUM 5 MG/1
5 TABLET, COATED ORAL DAILY
Qty: 90 TABLET | Refills: 0 | Status: SHIPPED | OUTPATIENT
Start: 2021-07-06 | End: 2021-09-23

## 2021-09-23 DIAGNOSIS — E78.01 HYPERLIPIDEMIA TYPE II: ICD-10-CM

## 2021-09-23 DIAGNOSIS — E11.9 TYPE 2 DIABETES MELLITUS WITHOUT COMPLICATION, WITHOUT LONG-TERM CURRENT USE OF INSULIN: ICD-10-CM

## 2021-09-23 DIAGNOSIS — Z00.00 PREVENTATIVE HEALTH CARE: ICD-10-CM

## 2021-09-23 RX ORDER — ROSUVASTATIN CALCIUM 5 MG/1
TABLET, COATED ORAL
Qty: 90 TABLET | Refills: 0 | Status: SHIPPED | OUTPATIENT
Start: 2021-09-23 | End: 2021-12-22 | Stop reason: SDUPTHER

## 2021-09-27 DIAGNOSIS — E11.9 TYPE 2 DIABETES MELLITUS WITHOUT COMPLICATION, WITHOUT LONG-TERM CURRENT USE OF INSULIN: ICD-10-CM

## 2021-09-27 DIAGNOSIS — E78.01 HYPERLIPIDEMIA TYPE II: ICD-10-CM

## 2021-09-27 DIAGNOSIS — Z00.00 PREVENTATIVE HEALTH CARE: ICD-10-CM

## 2021-09-27 RX ORDER — METFORMIN HYDROCHLORIDE 500 MG/1
500 TABLET ORAL 2 TIMES DAILY WITH MEALS
Qty: 180 TABLET | Refills: 3 | Status: SHIPPED | OUTPATIENT
Start: 2021-09-27 | End: 2021-12-26 | Stop reason: SDUPTHER

## 2021-10-01 ENCOUNTER — PATIENT MESSAGE (OUTPATIENT)
Dept: INTERNAL MEDICINE | Facility: CLINIC | Age: 51
End: 2021-10-01

## 2021-10-01 ENCOUNTER — IMMUNIZATION (OUTPATIENT)
Dept: INTERNAL MEDICINE | Facility: CLINIC | Age: 51
End: 2021-10-01
Payer: COMMERCIAL

## 2021-10-01 DIAGNOSIS — E11.9 TYPE 2 DIABETES MELLITUS WITHOUT COMPLICATION, WITHOUT LONG-TERM CURRENT USE OF INSULIN: Primary | ICD-10-CM

## 2021-10-01 DIAGNOSIS — Z23 NEED FOR VACCINATION: Primary | ICD-10-CM

## 2021-10-01 PROCEDURE — 0003A COVID-19, MRNA, LNP-S, PF, 30 MCG/0.3 ML DOSE VACCINE: CPT | Mod: PBBFAC | Performed by: INTERNAL MEDICINE

## 2021-10-01 PROCEDURE — 91300 COVID-19, MRNA, LNP-S, PF, 30 MCG/0.3 ML DOSE VACCINE: CPT | Mod: PBBFAC | Performed by: INTERNAL MEDICINE

## 2021-10-04 ENCOUNTER — PATIENT MESSAGE (OUTPATIENT)
Dept: INTERNAL MEDICINE | Facility: CLINIC | Age: 51
End: 2021-10-04

## 2021-10-04 ENCOUNTER — IMMUNIZATION (OUTPATIENT)
Dept: PHARMACY | Facility: CLINIC | Age: 51
End: 2021-10-04
Payer: COMMERCIAL

## 2021-10-07 ENCOUNTER — LAB VISIT (OUTPATIENT)
Dept: LAB | Facility: OTHER | Age: 51
End: 2021-10-07
Attending: FAMILY MEDICINE
Payer: COMMERCIAL

## 2021-10-07 DIAGNOSIS — E11.9 TYPE 2 DIABETES MELLITUS WITHOUT COMPLICATION, WITHOUT LONG-TERM CURRENT USE OF INSULIN: ICD-10-CM

## 2021-10-07 LAB
ESTIMATED AVG GLUCOSE: 148 MG/DL (ref 68–131)
HBA1C MFR BLD: 6.8 % (ref 4–5.6)

## 2021-10-07 PROCEDURE — 36415 COLL VENOUS BLD VENIPUNCTURE: CPT | Performed by: FAMILY MEDICINE

## 2021-10-07 PROCEDURE — 83036 HEMOGLOBIN GLYCOSYLATED A1C: CPT | Performed by: FAMILY MEDICINE

## 2021-12-20 ENCOUNTER — PATIENT MESSAGE (OUTPATIENT)
Dept: INTERNAL MEDICINE | Facility: CLINIC | Age: 51
End: 2021-12-20
Payer: COMMERCIAL

## 2021-12-20 DIAGNOSIS — E11.9 TYPE 2 DIABETES MELLITUS WITHOUT COMPLICATION, WITHOUT LONG-TERM CURRENT USE OF INSULIN: Primary | ICD-10-CM

## 2021-12-22 DIAGNOSIS — Z00.00 PREVENTATIVE HEALTH CARE: ICD-10-CM

## 2021-12-22 DIAGNOSIS — E11.9 TYPE 2 DIABETES MELLITUS WITHOUT COMPLICATION, WITHOUT LONG-TERM CURRENT USE OF INSULIN: ICD-10-CM

## 2021-12-22 DIAGNOSIS — E78.01 HYPERLIPIDEMIA TYPE II: ICD-10-CM

## 2021-12-23 RX ORDER — ROSUVASTATIN CALCIUM 5 MG/1
5 TABLET, COATED ORAL DAILY
Qty: 90 TABLET | Refills: 0 | Status: SHIPPED | OUTPATIENT
Start: 2021-12-23 | End: 2022-02-23 | Stop reason: SDUPTHER

## 2021-12-26 DIAGNOSIS — E11.9 TYPE 2 DIABETES MELLITUS WITHOUT COMPLICATION, WITHOUT LONG-TERM CURRENT USE OF INSULIN: ICD-10-CM

## 2021-12-26 DIAGNOSIS — E78.01 HYPERLIPIDEMIA TYPE II: ICD-10-CM

## 2021-12-26 DIAGNOSIS — Z00.00 PREVENTATIVE HEALTH CARE: ICD-10-CM

## 2021-12-27 RX ORDER — METFORMIN HYDROCHLORIDE 500 MG/1
500 TABLET ORAL 2 TIMES DAILY WITH MEALS
Qty: 180 TABLET | Refills: 3 | Status: SHIPPED | OUTPATIENT
Start: 2021-12-27 | End: 2022-03-17 | Stop reason: SDUPTHER

## 2021-12-30 ENCOUNTER — TELEPHONE (OUTPATIENT)
Dept: INTERNAL MEDICINE | Facility: CLINIC | Age: 51
End: 2021-12-30
Payer: COMMERCIAL

## 2021-12-30 DIAGNOSIS — E11.9 TYPE 2 DIABETES MELLITUS WITHOUT COMPLICATION, WITHOUT LONG-TERM CURRENT USE OF INSULIN: Primary | ICD-10-CM

## 2022-01-03 ENCOUNTER — PATIENT MESSAGE (OUTPATIENT)
Dept: PAIN MEDICINE | Facility: CLINIC | Age: 52
End: 2022-01-03
Payer: COMMERCIAL

## 2022-01-03 ENCOUNTER — TELEPHONE (OUTPATIENT)
Dept: PAIN MEDICINE | Facility: CLINIC | Age: 52
End: 2022-01-03
Payer: COMMERCIAL

## 2022-01-03 NOTE — TELEPHONE ENCOUNTER
"This message is for patient in regards to his/her appointment 1/4/22 at 9:40 am.      [facility] Policy: For the safety of all patients and staff members.     Patient Visitor policy: Due to social distancing and limited seating staff are requesting patient to arrive to their schedule appointments alone. If patient do not need assistance with their visit, we're asking all visitors to remain outside the waiting area.    Upon arriving to facility; patient will have temperature taking and are required to wear a face mask, if patient do not have a face mask one will be provided. Upon arriving patient we ask patients to contact clinic at this number [phone] to notify staff that they have arrived or they may do do by utilizing the Mobile checked in [place](if patient have patient portal; clinical staff will send a message through there letting them know it's okay to proceed to their visit). Staff will give the patient the "okay" to come up or wait inside their vehicle until clinic is ready for patient to be seen by Leola Mckeon np If you have any questions or concerns please contact [phone]        "

## 2022-01-04 ENCOUNTER — TELEPHONE (OUTPATIENT)
Dept: PAIN MEDICINE | Facility: OTHER | Age: 52
End: 2022-01-04
Payer: COMMERCIAL

## 2022-01-04 ENCOUNTER — OFFICE VISIT (OUTPATIENT)
Dept: PAIN MEDICINE | Facility: CLINIC | Age: 52
End: 2022-01-04
Payer: COMMERCIAL

## 2022-01-04 ENCOUNTER — PATIENT MESSAGE (OUTPATIENT)
Dept: PAIN MEDICINE | Facility: OTHER | Age: 52
End: 2022-01-04
Payer: COMMERCIAL

## 2022-01-04 VITALS
SYSTOLIC BLOOD PRESSURE: 137 MMHG | OXYGEN SATURATION: 98 % | HEIGHT: 70 IN | WEIGHT: 202 LBS | HEART RATE: 79 BPM | TEMPERATURE: 98 F | BODY MASS INDEX: 28.92 KG/M2 | DIASTOLIC BLOOD PRESSURE: 85 MMHG

## 2022-01-04 DIAGNOSIS — M54.12 CERVICAL RADICULOPATHY: Primary | ICD-10-CM

## 2022-01-04 DIAGNOSIS — M47.812 CERVICAL SPONDYLOSIS: ICD-10-CM

## 2022-01-04 PROCEDURE — 99213 PR OFFICE/OUTPT VISIT, EST, LEVL III, 20-29 MIN: ICD-10-PCS | Mod: S$GLB,,, | Performed by: NURSE PRACTITIONER

## 2022-01-04 PROCEDURE — 3079F DIAST BP 80-89 MM HG: CPT | Mod: CPTII,S$GLB,, | Performed by: NURSE PRACTITIONER

## 2022-01-04 PROCEDURE — 1160F PR REVIEW ALL MEDS BY PRESCRIBER/CLIN PHARMACIST DOCUMENTED: ICD-10-PCS | Mod: CPTII,S$GLB,, | Performed by: NURSE PRACTITIONER

## 2022-01-04 PROCEDURE — 3075F PR MOST RECENT SYSTOLIC BLOOD PRESS GE 130-139MM HG: ICD-10-PCS | Mod: CPTII,S$GLB,, | Performed by: NURSE PRACTITIONER

## 2022-01-04 PROCEDURE — 1160F RVW MEDS BY RX/DR IN RCRD: CPT | Mod: CPTII,S$GLB,, | Performed by: NURSE PRACTITIONER

## 2022-01-04 PROCEDURE — 99213 OFFICE O/P EST LOW 20 MIN: CPT | Mod: S$GLB,,, | Performed by: NURSE PRACTITIONER

## 2022-01-04 PROCEDURE — 3075F SYST BP GE 130 - 139MM HG: CPT | Mod: CPTII,S$GLB,, | Performed by: NURSE PRACTITIONER

## 2022-01-04 PROCEDURE — 1159F MED LIST DOCD IN RCRD: CPT | Mod: CPTII,S$GLB,, | Performed by: NURSE PRACTITIONER

## 2022-01-04 PROCEDURE — 3008F BODY MASS INDEX DOCD: CPT | Mod: CPTII,S$GLB,, | Performed by: NURSE PRACTITIONER

## 2022-01-04 PROCEDURE — 99999 PR PBB SHADOW E&M-EST. PATIENT-LVL III: CPT | Mod: PBBFAC,,, | Performed by: NURSE PRACTITIONER

## 2022-01-04 PROCEDURE — 1159F PR MEDICATION LIST DOCUMENTED IN MEDICAL RECORD: ICD-10-PCS | Mod: CPTII,S$GLB,, | Performed by: NURSE PRACTITIONER

## 2022-01-04 PROCEDURE — 99999 PR PBB SHADOW E&M-EST. PATIENT-LVL III: ICD-10-PCS | Mod: PBBFAC,,, | Performed by: NURSE PRACTITIONER

## 2022-01-04 PROCEDURE — 3079F PR MOST RECENT DIASTOLIC BLOOD PRESSURE 80-89 MM HG: ICD-10-PCS | Mod: CPTII,S$GLB,, | Performed by: NURSE PRACTITIONER

## 2022-01-04 PROCEDURE — 3008F PR BODY MASS INDEX (BMI) DOCUMENTED: ICD-10-PCS | Mod: CPTII,S$GLB,, | Performed by: NURSE PRACTITIONER

## 2022-01-04 NOTE — PROGRESS NOTES
Subjective:     Patient ID: Osmel Nair is a 51 y.o. male.    Chief Complaint: Pain    Consulted by: Self    Disclaimer: This note was generated using voice recognition software.  There may be typographical errors that were missed during proofreading.       Previous HPI interval history from visit with Dr. Lee, who is no longer with [facility]           SUBJECTIVE:     Osmel Nair is a 51 y.o. male with hx of T2DM who presents to the clinic for the evaluation of left shoulder pain. The pain started 5 weeks ago and symptoms have been persistent. No trauma or inciting event. The pain is located in the left upper trapezius area and radiates down the arm into the thumb and index finger. The pain initially involved the neck although this has resolved.  The arm and hand pain is described as numb and tingling. The shoulder pain is described as sharp and shooting and is rated as 2/10. The pain is rated with a score of  2/10 on the BEST day and a score of 8/10 on the WORST day.  Symptoms interfere with daily activity, sleeping and work. The pain is exacerbated by sleeping, lots of physical exertion, and manual labor.  The pain is mitigated by rest and medication. He reports spending 0 hours per day reclining. The patient reports 6-8 hours of uninterrupted sleep per night.     Patient denies urinary incontinence, bowel incontinence, significant weight loss, significant motor weakness and loss of sensations.    INTERVAL HISTORY (10/22/2018):     Osmel Nair presents to the clinic today for a follow-up appointment for neck pain. The patient reports 100% pain relief after cervical MARIA LUZ performed on 10/30/2017 which lasted 11 months. He reports the pain in his neck is gradually starting to return. The pain started one week ago. No injury or inciting event.The pain is located in the left cervical paraspinal area and radiates into the left shoulder. Current pain intensity is 2/10.  He describes it as sharp  sensation. The pain is exacerbated by cycling and head turning. Aleve provides mild-moderate relief.     Patient denies urinary incontinence, bowel incontinence, significant weight loss, significant motor weakness and loss of sensations.    Interval History (8/8/2019):  He returns today for follow up.  He reports that the Medrol Dosepak given at his most recent visit in 10/2018 provided very good relief for ~7.5 months, then similar pain as before returned. Pain starts in his left neck with consistent radiation to the left trap and shoulder region, occasionally down to the elbow, and also with occasional paresthesias in the left index and thumb. Denies weakness. Denies bowel or bladder incontinence. Cervical MARIA LUZ x1, oral steroids and NSAIDs, and yoga has been helpful for the pain.    Interval History (9/27/2019):  The patient is here for follow up.  He is s/p cervical MARIA LUZ on 8/30/19 with 80% relief.  His pain is now mild.  He has no neck pain.  He has some numbness and pain to left hand intermittently, mainly with turning his head.  He has been working on stretching.  He stopped Lodine because he found limited benefit.  He takes Tylenol as needed which is helpful.  His pain today is 2/10.    Interval History (1/4/2022):  The patient is here today to discuss acute on chronic neck pain.  He has had longstanding neck pain secondary to neuroforaminal narrowing of the cervical spine for many years.  He states that over the past 2 years, he has been able to control his pain with over-the-counter medications and physical therapy exercises.  He has currently been performing home physical therapy exercises 3-4 days weekly for the past 12 weeks.  However, he has had significant pain over this time.  He did have excellent benefit with cervical epidurals in the past, with the last being over 2 years ago.  He wishes to repeat at this time.  His primary complaint is neck pain with radiation down the right arm and associated  numbness and tingling to digits 1 through 3. No new weakness or dropping of objects.  No bowel or bladder incontinence.  His pain today is 8/10.    Aggravating factors: standing for long periods, sitting at a desk for long periods    Mitigating factors: medications, raising arm above head improves pain    Previously seeing: Dr Mata, Dr. Ulloa    Physical Therapy: PT exercises 3-4 days weekly for past 12 weeks.    Non-pharmacologic Treatment:     · Ice/Heat: Heat partially effective  · TENS: hasn't tried  · Massage: hasnt tried  · Chiropractic care: hasnt tried  · Acupuncture: hasnt tried  · Other: Yoga is helpful, attends at least weekly         Pain Medications:         · Currently taking: OTC Tylenol PRN    · Has tried in the past:    · Opioids: hasnt tried  · NSAIDS: Naproxen and Lodine with minimal relief  · Tylenol: minimal relief  · Muscle relaxants: hasnt tried  · TCAs: hasnt tried  · SNRIs: hasnt tried  · Anticonvulsants: Gabapentin 300 BID, not helpful, experienced paresthesias in both hands stopped  · topical creams: icy hot provided temporary relief  · Other: none    Blood thinners: no    Interventional Therapies:   · 10/30/2017:  C7/T1 MARIA LUZ:  100% pain relief for 11 months  · 8/30/2017 C7/T1 MARIA LUZ: 80% relief    Relevant Surgeries: none    Affecting sleep? yes    Affecting daily activities? yes    Depressive symptoms? denies          · SI/HI? No    Work status: works as  here in [place]    Prescription Monitoring Program database:  Reviewed and consistent with medication use as prescribed. (No record or any controlled substance RXs)    Last 3 PDI Scores 1/4/2022 9/27/2019 8/8/2019   Pain Disability Index (PDI) 46 24 43       Opioid Risk Score     None          GENERAL:  No weight loss, malaise or fevers.  HEENT:   No recent changes in vision or hearing  NECK:  Negative for lumps, no difficulty with swallowing.  RESPIRATORY:  Negative for cough, wheezing or shortness of breath, patient  denies any recent URI.  CARDIOVASCULAR:  Negative for chest pain, leg swelling or palpitations.  GI:  Negative for abdominal discomfort, blood in stools or black stools or change in bowel habits.  MUSCULOSKELETAL:  See HPI.  SKIN:  Negative for lesions, rash, and itching.  PSYCH:  Negative for mood disorder or recent psychosocial stressors.    HEMATOLOGY/LYMPHOLOGY:  Negative for prolonged bleeding, bruising easily or swollen nodes.    ENDO: +diabetes, denies thyroid dysfunction  NEURO:   + history of stage I lyme disease treated with antibiotics. No history of headaches, syncope, paralysis, seizures or tremors.  All other reviewed and negative other than HPI.          Past Medical History:   Diagnosis Date    Diabetes mellitus, type 2 07/2016    Lyme disease 2016       Past Surgical History:   Procedure Laterality Date    EPIDURAL STEROID INJECTION N/A 8/30/2019    Procedure: INJECTION, STEROID, EPIDURAL, CERVICAL C7-T1 INTERLAMINAR need consent;  Surgeon: Uzma Ulloa MD;  [facility];  Service: Pain Management;  Laterality: N/A;    KNEE ARTHROSCOPY Right 1989       Review of patient's allergies indicates:  No Known Allergies    Current Outpatient Medications   Medication Sig Dispense Refill    CONTOUR NEXT TEST STRIPS Strp USE THREE TIMES DAILY 300 strip 0    metFORMIN (GLUCOPHAGE) 500 MG tablet Take 1 tablet (500 mg total) by mouth 2 (two) times daily with meals. 180 tablet 3    rosuvastatin (CRESTOR) 5 MG tablet Take 1 tablet (5 mg total) by mouth once daily. 90 tablet 0     No current facility-administered medications for this visit.       Family History   Problem Relation Age of Onset    Diabetes Mother     Diabetes Father     Diabetes Sister        Social History     Socioeconomic History    Marital status: Single   Tobacco Use    Smoking status: Current Every Day Smoker    Smokeless tobacco: Never Used   Substance and Sexual Activity    Alcohol use: Yes       Objective:  "    Vitals:    01/04/22 0915   BP: 137/85   Pulse: 79   Temp: 97.7 °F (36.5 °C)   SpO2: 98%   Weight: 91.6 kg (202 lb)   Height: 5' 10" (1.778 m)   PainSc:   8   PainLoc: Neck       GEN:  Well developed, well nourished.  No acute distress.  No pain behavior.  HEENT:  No trauma.  Mucous membranes moist.  Nares patent bilaterally.  PSYCH: Normal affect. Thought content appropriate.  CHEST:  Breathing symmetric.  No audible wheezing.  ABD: Soft, non-distended.  SKIN:  Warm, pink, dry.  No rash on exposed areas.    EXT:  No cyanosis, clubbing, or edema.  No color change or changes in nail or hair growth.  5/5 motor strength throughout upper extremities.   Sensory: no sensory deficit in the upper extremities.  NEURO/MUSCULOSKELETAL:  Fully alert, oriented, and appropriate. Speech normal alejandro. No cranial nerve deficits.   Gait: normal gait, no AD.     Reflexes: 2+ and symmetric throughout.  Absent Holly's bilaterally.  C-Spine: Full ROM with pain on flexion and lateral rotation.  Negative facet loading.  Positive Spurling on the right. TTP over trapezius muscles on the right.  Neuro: Decreased sensation in a right C6/7 distribution.       Imaging:        The imaging studies listed below were independently reviewed by me, and I agree with the findings as documented below.     MRI C-spine 10/6/2017:  Findings:    There is straightening of the normal cervical lordosis. The vertebral body heights and alignment are within normal limits. Intervertebral disk spaces are well preserved. Bone marrow signal is normal.    Visualized posterior fossa structures and cervical cord are unremarkable.    Limited evaluation of the neck soft tissues is unremarkable.    C2-3: No spinal canal stenosis or neuroforaminal narrowing.    C3-4: Posterior disc osteophyte complex formation and uncovertebral arthrosis resulting in mild bilateral neuroforaminal narrowing and no spinal canal stenosis.    C4-5: Posterior disc osteophyte complex " formation and uncovertebral arthrosis resulting in mild bilateral neuroforaminal narrowing and no spinal canal stenosis.    C5-6: Posterior disc osteophyte complex formation with uncovertebral arthrosis resulting in moderate left and mild right neuroforaminal narrowing. No spinal canal stenosis    C6-7: Posterior disc osteophyte complex formation without spinal canal stenosis or neuroforaminal narrowing.      Impression       Mild cervical spondylosis with varying degrees of mild to moderate neuroforaminal narrowing, as described above.       Assessment:     Encounter Diagnoses   Name Primary?    Cervical spondylosis Yes    Cervical radiculopathy        Plan:     Osmel was seen today for neck pain and shoulder pain.    Diagnoses and all orders for this visit:    Cervical spondylosis    Cervical radiculopathy         His neck pain is consistent with the above.    We discussed the assessment and recommendations.  All available images were reviewed. We discussed the disease process, prognosis, treatment plan, and risks and benefits. The patient is aware of the risks and benefits of the medications being prescribed, common side effects, and proper usage. The following is the plan we agreed on:     1. The patient's symptoms are consistent with previous diagnosis of cervical radiculopathy found on previous MRI.  He had excellent benefit with cervical epidural in the past for over 2 years.  Will schedule for repeat C7/T1 IL MARIA LUZ.  May have with any of our physicians here  2. Can continue over-the-counter medications as needed.  3. Continue yoga and home physical therapy exercises.  4. RTC 2 weeks after procedure.        Leola Schneider, KIMBERLY  01/04/2022     The above plan and management options were discussed at length with patient. Patient is in agreement with the above and verbalized understanding.

## 2022-01-04 NOTE — H&P (VIEW-ONLY)
Subjective:     Patient ID: Osmel Nair is a 51 y.o. male.    Chief Complaint: Pain    Consulted by: Self    Disclaimer: This note was generated using voice recognition software.  There may be typographical errors that were missed during proofreading.       Previous HPI interval history from visit with Dr. Lee, who is no longer with [facility]           SUBJECTIVE:     Osmel Nair is a 51 y.o. male with hx of T2DM who presents to the clinic for the evaluation of left shoulder pain. The pain started 5 weeks ago and symptoms have been persistent. No trauma or inciting event. The pain is located in the left upper trapezius area and radiates down the arm into the thumb and index finger. The pain initially involved the neck although this has resolved.  The arm and hand pain is described as numb and tingling. The shoulder pain is described as sharp and shooting and is rated as 2/10. The pain is rated with a score of  2/10 on the BEST day and a score of 8/10 on the WORST day.  Symptoms interfere with daily activity, sleeping and work. The pain is exacerbated by sleeping, lots of physical exertion, and manual labor.  The pain is mitigated by rest and medication. He reports spending 0 hours per day reclining. The patient reports 6-8 hours of uninterrupted sleep per night.     Patient denies urinary incontinence, bowel incontinence, significant weight loss, significant motor weakness and loss of sensations.    INTERVAL HISTORY (10/22/2018):     Osmel Nair presents to the clinic today for a follow-up appointment for neck pain. The patient reports 100% pain relief after cervical MARIA LUZ performed on 10/30/2017 which lasted 11 months. He reports the pain in his neck is gradually starting to return. The pain started one week ago. No injury or inciting event.The pain is located in the left cervical paraspinal area and radiates into the left shoulder. Current pain intensity is 2/10.  He describes it as sharp  sensation. The pain is exacerbated by cycling and head turning. Aleve provides mild-moderate relief.     Patient denies urinary incontinence, bowel incontinence, significant weight loss, significant motor weakness and loss of sensations.    Interval History (8/8/2019):  He returns today for follow up.  He reports that the Medrol Dosepak given at his most recent visit in 10/2018 provided very good relief for ~7.5 months, then similar pain as before returned. Pain starts in his left neck with consistent radiation to the left trap and shoulder region, occasionally down to the elbow, and also with occasional paresthesias in the left index and thumb. Denies weakness. Denies bowel or bladder incontinence. Cervical MARIA LUZ x1, oral steroids and NSAIDs, and yoga has been helpful for the pain.    Interval History (9/27/2019):  The patient is here for follow up.  He is s/p cervical MARIA LUZ on 8/30/19 with 80% relief.  His pain is now mild.  He has no neck pain.  He has some numbness and pain to left hand intermittently, mainly with turning his head.  He has been working on stretching.  He stopped Lodine because he found limited benefit.  He takes Tylenol as needed which is helpful.  His pain today is 2/10.    Interval History (1/4/2022):  The patient is here today to discuss acute on chronic neck pain.  He has had longstanding neck pain secondary to neuroforaminal narrowing of the cervical spine for many years.  He states that over the past 2 years, he has been able to control his pain with over-the-counter medications and physical therapy exercises.  He has currently been performing home physical therapy exercises 3-4 days weekly for the past 12 weeks.  However, he has had significant pain over this time.  He did have excellent benefit with cervical epidurals in the past, with the last being over 2 years ago.  He wishes to repeat at this time.  His primary complaint is neck pain with radiation down the right arm and associated  numbness and tingling to digits 1 through 3. No new weakness or dropping of objects.  No bowel or bladder incontinence.  His pain today is 8/10.    Aggravating factors: standing for long periods, sitting at a desk for long periods    Mitigating factors: medications, raising arm above head improves pain    Previously seeing: Dr Mata, Dr. Ulloa    Physical Therapy: PT exercises 3-4 days weekly for past 12 weeks.    Non-pharmacologic Treatment:     · Ice/Heat: Heat partially effective  · TENS: hasn't tried  · Massage: hasnt tried  · Chiropractic care: hasnt tried  · Acupuncture: hasnt tried  · Other: Yoga is helpful, attends at least weekly         Pain Medications:         · Currently taking: OTC Tylenol PRN    · Has tried in the past:    · Opioids: hasnt tried  · NSAIDS: Naproxen and Lodine with minimal relief  · Tylenol: minimal relief  · Muscle relaxants: hasnt tried  · TCAs: hasnt tried  · SNRIs: hasnt tried  · Anticonvulsants: Gabapentin 300 BID, not helpful, experienced paresthesias in both hands stopped  · topical creams: icy hot provided temporary relief  · Other: none    Blood thinners: no    Interventional Therapies:   · 10/30/2017:  C7/T1 MARIA LUZ:  100% pain relief for 11 months  · 8/30/2017 C7/T1 MARIA LUZ: 80% relief    Relevant Surgeries: none    Affecting sleep? yes    Affecting daily activities? yes    Depressive symptoms? denies          · SI/HI? No    Work status: works as  here in [place]    Prescription Monitoring Program database:  Reviewed and consistent with medication use as prescribed. (No record or any controlled substance RXs)    Last 3 PDI Scores 1/4/2022 9/27/2019 8/8/2019   Pain Disability Index (PDI) 46 24 43       Opioid Risk Score     None          GENERAL:  No weight loss, malaise or fevers.  HEENT:   No recent changes in vision or hearing  NECK:  Negative for lumps, no difficulty with swallowing.  RESPIRATORY:  Negative for cough, wheezing or shortness of breath, patient  denies any recent URI.  CARDIOVASCULAR:  Negative for chest pain, leg swelling or palpitations.  GI:  Negative for abdominal discomfort, blood in stools or black stools or change in bowel habits.  MUSCULOSKELETAL:  See HPI.  SKIN:  Negative for lesions, rash, and itching.  PSYCH:  Negative for mood disorder or recent psychosocial stressors.    HEMATOLOGY/LYMPHOLOGY:  Negative for prolonged bleeding, bruising easily or swollen nodes.    ENDO: +diabetes, denies thyroid dysfunction  NEURO:   + history of stage I lyme disease treated with antibiotics. No history of headaches, syncope, paralysis, seizures or tremors.  All other reviewed and negative other than HPI.          Past Medical History:   Diagnosis Date    Diabetes mellitus, type 2 07/2016    Lyme disease 2016       Past Surgical History:   Procedure Laterality Date    EPIDURAL STEROID INJECTION N/A 8/30/2019    Procedure: INJECTION, STEROID, EPIDURAL, CERVICAL C7-T1 INTERLAMINAR need consent;  Surgeon: Uzma Ulloa MD;  Location: [facility] PAIN T;  Service: Pain Management;  Laterality: N/A;    KNEE ARTHROSCOPY Right 1989       Review of patient's allergies indicates:  No Known Allergies    Current Outpatient Medications   Medication Sig Dispense Refill    CONTOUR NEXT TEST STRIPS Strp USE THREE TIMES DAILY 300 strip 0    metFORMIN (GLUCOPHAGE) 500 MG tablet Take 1 tablet (500 mg total) by mouth 2 (two) times daily with meals. 180 tablet 3    rosuvastatin (CRESTOR) 5 MG tablet Take 1 tablet (5 mg total) by mouth once daily. 90 tablet 0     No current facility-administered medications for this visit.       Family History   Problem Relation Age of Onset    Diabetes Mother     Diabetes Father     Diabetes Sister        Social History     Socioeconomic History    Marital status: Single   Tobacco Use    Smoking status: Current Every Day Smoker    Smokeless tobacco: Never Used   Substance and Sexual Activity    Alcohol use: Yes       Objective:  "    Vitals:    01/04/22 0915   BP: 137/85   Pulse: 79   Temp: 97.7 °F (36.5 °C)   SpO2: 98%   Weight: 91.6 kg (202 lb)   Height: 5' 10" (1.778 m)   PainSc:   8   PainLoc: Neck       GEN:  Well developed, well nourished.  No acute distress.  No pain behavior.  HEENT:  No trauma.  Mucous membranes moist.  Nares patent bilaterally.  PSYCH: Normal affect. Thought content appropriate.  CHEST:  Breathing symmetric.  No audible wheezing.  ABD: Soft, non-distended.  SKIN:  Warm, pink, dry.  No rash on exposed areas.    EXT:  No cyanosis, clubbing, or edema.  No color change or changes in nail or hair growth.  5/5 motor strength throughout upper extremities.   Sensory: no sensory deficit in the upper extremities.  NEURO/MUSCULOSKELETAL:  Fully alert, oriented, and appropriate. Speech normal alejandro. No cranial nerve deficits.   Gait: normal gait, no AD.     Reflexes: 2+ and symmetric throughout.  Absent Holly's bilaterally.  C-Spine: Full ROM with pain on flexion and lateral rotation.  Negative facet loading.  Positive Spurling on the right. TTP over trapezius muscles on the right.  Neuro: Decreased sensation in a right C6/7 distribution.       Imaging:        The imaging studies listed below were independently reviewed by me, and I agree with the findings as documented below.     MRI C-spine 10/6/2017:  Findings:    There is straightening of the normal cervical lordosis. The vertebral body heights and alignment are within normal limits. Intervertebral disk spaces are well preserved. Bone marrow signal is normal.    Visualized posterior fossa structures and cervical cord are unremarkable.    Limited evaluation of the neck soft tissues is unremarkable.    C2-3: No spinal canal stenosis or neuroforaminal narrowing.    C3-4: Posterior disc osteophyte complex formation and uncovertebral arthrosis resulting in mild bilateral neuroforaminal narrowing and no spinal canal stenosis.    C4-5: Posterior disc osteophyte complex " formation and uncovertebral arthrosis resulting in mild bilateral neuroforaminal narrowing and no spinal canal stenosis.    C5-6: Posterior disc osteophyte complex formation with uncovertebral arthrosis resulting in moderate left and mild right neuroforaminal narrowing. No spinal canal stenosis    C6-7: Posterior disc osteophyte complex formation without spinal canal stenosis or neuroforaminal narrowing.      Impression       Mild cervical spondylosis with varying degrees of mild to moderate neuroforaminal narrowing, as described above.       Assessment:     Encounter Diagnoses   Name Primary?    Cervical spondylosis Yes    Cervical radiculopathy        Plan:     Osmel was seen today for neck pain and shoulder pain.    Diagnoses and all orders for this visit:    Cervical spondylosis    Cervical radiculopathy         His neck pain is consistent with the above.    We discussed the assessment and recommendations.  All available images were reviewed. We discussed the disease process, prognosis, treatment plan, and risks and benefits. The patient is aware of the risks and benefits of the medications being prescribed, common side effects, and proper usage. The following is the plan we agreed on:     1. The patient's symptoms are consistent with previous diagnosis of cervical radiculopathy found on previous MRI.  He had excellent benefit with cervical epidural in the past for over 2 years.  Will schedule for repeat C7/T1 IL MARIA LUZ.  May have with any of our physicians here  2. Can continue over-the-counter medications as needed.  3. Continue yoga and home physical therapy exercises.  4. RTC 2 weeks after procedure.        Leola Schneider, KIMBERLY  01/04/2022     The above plan and management options were discussed at length with patient. Patient is in agreement with the above and verbalized understanding.

## 2022-01-05 ENCOUNTER — LAB VISIT (OUTPATIENT)
Dept: LAB | Facility: OTHER | Age: 52
End: 2022-01-05
Attending: INTERNAL MEDICINE
Payer: COMMERCIAL

## 2022-01-05 DIAGNOSIS — E11.9 TYPE 2 DIABETES MELLITUS WITHOUT COMPLICATION, WITHOUT LONG-TERM CURRENT USE OF INSULIN: ICD-10-CM

## 2022-01-05 PROCEDURE — 82570 ASSAY OF URINE CREATININE: CPT | Performed by: INTERNAL MEDICINE

## 2022-01-05 PROCEDURE — 82043 UR ALBUMIN QUANTITATIVE: CPT | Performed by: INTERNAL MEDICINE

## 2022-01-06 LAB
ALBUMIN/CREAT UR: NORMAL UG/MG (ref 0–30)
CREAT UR-MCNC: 24 MG/DL (ref 23–375)
MICROALBUMIN UR DL<=1MG/L-MCNC: <5 UG/ML

## 2022-01-07 ENCOUNTER — TELEPHONE (OUTPATIENT)
Dept: PAIN MEDICINE | Facility: CLINIC | Age: 52
End: 2022-01-07
Payer: COMMERCIAL

## 2022-01-07 NOTE — TELEPHONE ENCOUNTER
----- Message from Zoran Palmer sent at 1/7/2022  8:14 AM CST -----  Regarding: Covid Testing  Name of Who is Calling: AAMIR SNIDER [[id number]]           What is the request in detail:Patient is requesting a call back to discuss at home COVID testing prior to procedure.  He needs to know if there is a specific test needed.  Please assist.           Can the clinic reply by [facility]: No           [facility] [phone]

## 2022-01-09 ENCOUNTER — PATIENT MESSAGE (OUTPATIENT)
Dept: ADMINISTRATIVE | Facility: OTHER | Age: 52
End: 2022-01-09
Payer: COMMERCIAL

## 2022-01-10 ENCOUNTER — HOSPITAL ENCOUNTER (OUTPATIENT)
Facility: OTHER | Age: 52
Discharge: HOME | End: 2022-01-10
Attending: ANESTHESIOLOGY | Admitting: ANESTHESIOLOGY
Payer: COMMERCIAL

## 2022-01-10 VITALS
TEMPERATURE: 98 F | OXYGEN SATURATION: 97 % | HEART RATE: 75 BPM | HEIGHT: 70 IN | WEIGHT: 200 LBS | BODY MASS INDEX: 28.63 KG/M2 | DIASTOLIC BLOOD PRESSURE: 94 MMHG | SYSTOLIC BLOOD PRESSURE: 141 MMHG | RESPIRATION RATE: 16 BRPM

## 2022-01-10 DIAGNOSIS — M50.30 DDD (DEGENERATIVE DISC DISEASE), CERVICAL: Primary | ICD-10-CM

## 2022-01-10 DIAGNOSIS — M54.12 CERVICAL RADICULOPATHY: ICD-10-CM

## 2022-01-10 DIAGNOSIS — G89.29 CHRONIC PAIN: ICD-10-CM

## 2022-01-10 PROCEDURE — 62321 NJX INTERLAMINAR CRV/THRC: CPT | Performed by: ANESTHESIOLOGY

## 2022-01-10 PROCEDURE — 62321 PR INJ CERV/THORAC, W/GUIDANCE: ICD-10-PCS | Mod: ,,, | Performed by: ANESTHESIOLOGY

## 2022-01-10 PROCEDURE — 25000003 PHARM REV CODE 250: Performed by: ANESTHESIOLOGY

## 2022-01-10 PROCEDURE — 25500020 PHARM REV CODE 255: Performed by: ANESTHESIOLOGY

## 2022-01-10 PROCEDURE — 62321 NJX INTERLAMINAR CRV/THRC: CPT | Mod: ,,, | Performed by: ANESTHESIOLOGY

## 2022-01-10 PROCEDURE — 63600175 PHARM REV CODE 636 W HCPCS: Performed by: ANESTHESIOLOGY

## 2022-01-10 RX ORDER — ALPRAZOLAM 0.5 MG/1
0.5 TABLET ORAL
Status: DISCONTINUED | OUTPATIENT
Start: 2022-01-10 | End: 2022-01-10 | Stop reason: HOSPADM

## 2022-01-10 RX ORDER — LIDOCAINE HYDROCHLORIDE 10 MG/ML
INJECTION, SOLUTION EPIDURAL; INFILTRATION; INTRACAUDAL; PERINEURAL
Status: DISCONTINUED | OUTPATIENT
Start: 2022-01-10 | End: 2022-01-10 | Stop reason: HOSPADM

## 2022-01-10 RX ORDER — DEXAMETHASONE SODIUM PHOSPHATE 10 MG/ML
INJECTION INTRAMUSCULAR; INTRAVENOUS
Status: DISCONTINUED | OUTPATIENT
Start: 2022-01-10 | End: 2022-01-10 | Stop reason: HOSPADM

## 2022-01-10 RX ORDER — LIDOCAINE HYDROCHLORIDE 20 MG/ML
INJECTION, SOLUTION INFILTRATION; PERINEURAL
Status: DISCONTINUED | OUTPATIENT
Start: 2022-01-10 | End: 2022-01-10 | Stop reason: HOSPADM

## 2022-01-10 NOTE — BRIEF OP NOTE
Discharge Note  Short Stay      SUMMARY     Admit Date: 1/10/2022    Attending Physician: Otis Galindo      Discharge Physician: Otis Galindo      Discharge Date: 1/10/2022 1:07 PM    Procedure(s) (LRB):  Injection, Steroid, Epidural C7/T1 (N/A)    Final Diagnosis: Cervical radiculopathy [M54.12]    Disposition: Home or self care    Patient Instructions:   Current Discharge Medication List      CONTINUE these medications which have NOT CHANGED    Details   CONTOUR NEXT TEST STRIPS Strp USE THREE TIMES DAILY  Qty: 300 strip, Refills: 0    Associated Diagnoses: Type 2 diabetes mellitus without complication, without long-term current use of insulin; Preventative health care; Hyperlipidemia type II      metFORMIN (GLUCOPHAGE) 500 MG tablet Take 1 tablet (500 mg total) by mouth 2 (two) times daily with meals.  Qty: 180 tablet, Refills: 3    Associated Diagnoses: Type 2 diabetes mellitus without complication, without long-term current use of insulin; Preventative health care; Hyperlipidemia type II      rosuvastatin (CRESTOR) 5 MG tablet Take 1 tablet (5 mg total) by mouth once daily.  Qty: 90 tablet, Refills: 0    Associated Diagnoses: Type 2 diabetes mellitus without complication, without long-term current use of insulin; Preventative health care; Hyperlipidemia type II                 Discharge Diagnosis: Cervical radiculopathy [M54.12]  Condition on Discharge: Stable with no complications to procedure   Diet on Discharge: Same as before.  Activity: as per instruction sheet.  Discharge to: Home with a responsible adult.  Follow up: 2-4 weeks       Please call my office or pager at [phone] if experienced any weakness or loss of sensation, fever > 101.5, pain uncontrolled with oral medications, persistent nausea/vomiting/or diarrhea, redness or drainage from the incisions, or any other worrisome concerns. If physician on call was not reached or could not communicate with our office for any reason please go to the  nearest emergency department

## 2022-01-10 NOTE — INTERVAL H&P NOTE
"HPI  Patient presenting for Procedure(s) (LRB):  Injection, Steroid, Epidural C7/T1 (N/A)     Patient on Anti-coagulation No    No health changes since previous encounter    Past Medical History:   Diagnosis Date    Diabetes mellitus, type 2 07/2016    Lyme disease 2016     Past Surgical History:   Procedure Laterality Date    EPIDURAL STEROID INJECTION N/A 8/30/2019    Procedure: INJECTION, STEROID, EPIDURAL, CERVICAL C7-T1 INTERLAMINAR need consent;  Surgeon: Uzma Ulloa MD;  Location: [facility];  Service: Pain Management;  Laterality: N/A;    KNEE ARTHROSCOPY Right 1989     Review of patient's allergies indicates:  No Known Allergies   Current Facility-Administered Medications   Medication    ALPRAZolam tablet 0.5 mg       PMHx, PSHx, Allergies, Medications reviewed in epic    ROS negative except pain complaints in HPI    OBJECTIVE:    BP (!) 149/93 (BP Location: Right arm, Patient Position: Lying)   Pulse 79   Temp 98.3 °F (36.8 °C)   Resp 16   Ht 5' 10" (1.778 m)   Wt 90.7 kg (200 lb)   SpO2 97%   BMI 28.70 kg/m²     PHYSICAL EXAMINATION:    GENERAL: Well appearing, in no acute distress, alert and oriented x3.  PSYCH:  Mood and affect appropriate.  SKIN: Skin color, texture, turgor normal, no rashes or lesions which will impact the procedure.  CV: RRR with palpation of the radial artery.  PULM: No evidence of respiratory difficulty, symmetric chest rise. Clear to auscultation.  NEURO: Cranial nerves grossly intact.    Plan:    Proceed with procedure as planned Procedure(s) (LRB):  Injection, Steroid, Epidural C7/T1 (N/A)    Leola Jacob  01/10/2022           "

## 2022-01-10 NOTE — DISCHARGE INSTRUCTIONS

## 2022-01-10 NOTE — OP NOTE
Cervical Interlaminar Epidural Steroid Injection under Fluoroscopic Guidance    The procedure, risks, benefits, and options were discussed with the patient. There are no contraindications to the procedure. The patent expressed understanding and agreed to the procedure. Informed written consent was obtained prior to the start of the procedure and can be found in the patient's chart.     PATIENT NAME: Osmel Nair   MRN: [id number]     DATE OF PROCEDURE: 01/10/2022    PROCEDURE: Cervical Interlaminar Epidural Steroid Injection C7/T1 under Fluoroscopic Guidance    PRE-OP DIAGNOSIS: Cervical radiculopathy [M54.12]    POST-OP DIAGNOSIS: Same    PHYSICIAN: Otis Galindo MD     ASSISTANTS: Leola Jacob MD  Neurology resident     MEDICATIONS INJECTED: Preservative-free Decadron 10mg with 1cc of Lidocaine 1% MPF and preservative free normal saline    LOCAL ANESTHETIC INJECTED: Xylocaine 2%     SEDATION: None    ESTIMATED BLOOD LOSS: None    COMPLICATIONS: None    TECHNIQUE: Time-out was performed to identify the patient and procedure to be performed. With the patient laying in a prone position, the surgical area was prepped and draped in the usual sterile fashion using ChloraPrep and a fenestrated drape. The level was determined under fluoroscopy guidance. Skin anesthesia was achieved by injecting Lidocaine 2% over the injection site.  The interlaminar space was then approached with a 20 gauge, 3.5 inch Tuohy needle that was introduced under fluoroscopic guidance with AP, lateral and/or contralateral oblique imaging. Once the Ligamentum flavum was encountered loss of resistance to saline was used to enter the epidural space. With positive loss of resistance and negative aspiration for CSF or Blood, contrast dye Omnipaque (300mg/ml) was injected to confirm placement and there was no vascular runoff. Then 2 mL of the medication mixture listed above was then injected slowly. Displacement of the radio opaque contrast after  injection of the medication confirmed that the medication went into the area of the epidural space. The needles were removed, and bleeding was nil. A sterile dressing was applied. No specimens collected. The patient tolerated the procedure well.   PAIN BEFORE THE PROCEDURE: 6-8/10    PAIN AFTER THE PROCEDURE: 2/10     The patient was monitored after the procedure in the recovery area. They were given post-procedure and discharge instructions to follow at home. The patient was discharged in a stable condition.    Otis Galindo MD

## 2022-01-11 LAB — POCT GLUCOSE: 137 MG/DL (ref 70–110)

## 2022-01-20 ENCOUNTER — OFFICE VISIT (OUTPATIENT)
Dept: PODIATRY | Facility: CLINIC | Age: 52
End: 2022-01-20
Payer: COMMERCIAL

## 2022-01-20 VITALS
HEART RATE: 95 BPM | WEIGHT: 199.94 LBS | HEIGHT: 70 IN | SYSTOLIC BLOOD PRESSURE: 126 MMHG | BODY MASS INDEX: 28.62 KG/M2 | DIASTOLIC BLOOD PRESSURE: 84 MMHG

## 2022-01-20 DIAGNOSIS — E11.9 TYPE 2 DIABETES MELLITUS WITHOUT COMPLICATION, WITHOUT LONG-TERM CURRENT USE OF INSULIN: Primary | ICD-10-CM

## 2022-01-20 PROCEDURE — 99999 PR PBB SHADOW E&M-EST. PATIENT-LVL III: CPT | Mod: PBBFAC,,, | Performed by: PODIATRIST

## 2022-01-20 PROCEDURE — 1159F PR MEDICATION LIST DOCUMENTED IN MEDICAL RECORD: ICD-10-PCS | Mod: CPTII,S$GLB,, | Performed by: PODIATRIST

## 2022-01-20 PROCEDURE — 3066F NEPHROPATHY DOC TX: CPT | Mod: CPTII,S$GLB,, | Performed by: PODIATRIST

## 2022-01-20 PROCEDURE — 3008F BODY MASS INDEX DOCD: CPT | Mod: CPTII,S$GLB,, | Performed by: PODIATRIST

## 2022-01-20 PROCEDURE — 99999 PR PBB SHADOW E&M-EST. PATIENT-LVL III: ICD-10-PCS | Mod: PBBFAC,,, | Performed by: PODIATRIST

## 2022-01-20 PROCEDURE — 3079F PR MOST RECENT DIASTOLIC BLOOD PRESSURE 80-89 MM HG: ICD-10-PCS | Mod: CPTII,S$GLB,, | Performed by: PODIATRIST

## 2022-01-20 PROCEDURE — 1159F MED LIST DOCD IN RCRD: CPT | Mod: CPTII,S$GLB,, | Performed by: PODIATRIST

## 2022-01-20 PROCEDURE — 3066F PR DOCUMENTATION OF TREATMENT FOR NEPHROPATHY: ICD-10-PCS | Mod: CPTII,S$GLB,, | Performed by: PODIATRIST

## 2022-01-20 PROCEDURE — 3074F SYST BP LT 130 MM HG: CPT | Mod: CPTII,S$GLB,, | Performed by: PODIATRIST

## 2022-01-20 PROCEDURE — 3074F PR MOST RECENT SYSTOLIC BLOOD PRESSURE < 130 MM HG: ICD-10-PCS | Mod: CPTII,S$GLB,, | Performed by: PODIATRIST

## 2022-01-20 PROCEDURE — 3079F DIAST BP 80-89 MM HG: CPT | Mod: CPTII,S$GLB,, | Performed by: PODIATRIST

## 2022-01-20 PROCEDURE — 99213 OFFICE O/P EST LOW 20 MIN: CPT | Mod: S$GLB,,, | Performed by: PODIATRIST

## 2022-01-20 PROCEDURE — 3061F PR NEG MICROALBUMINURIA RESULT DOCUMENTED/REVIEW: ICD-10-PCS | Mod: CPTII,S$GLB,, | Performed by: PODIATRIST

## 2022-01-20 PROCEDURE — 99213 PR OFFICE/OUTPT VISIT, EST, LEVL III, 20-29 MIN: ICD-10-PCS | Mod: S$GLB,,, | Performed by: PODIATRIST

## 2022-01-20 PROCEDURE — 3061F NEG MICROALBUMINURIA REV: CPT | Mod: CPTII,S$GLB,, | Performed by: PODIATRIST

## 2022-01-20 PROCEDURE — 3008F PR BODY MASS INDEX (BMI) DOCUMENTED: ICD-10-PCS | Mod: CPTII,S$GLB,, | Performed by: PODIATRIST

## 2022-01-20 NOTE — PROGRESS NOTES
Subjective:      Patient ID: Osmel Nair is a 51 y.o. male.    Chief Complaint: Annual Exam ([facility]cp[facility] 4/23/2021)    Osmel is a 51 y.o. male who presents to the clinic upon referral from Dr. Eli parsons. provider found  for evaluation and treatment of diabetic feet. Osmel has a past medical history of Diabetes mellitus, type 2 (07/2016) and Lyme disease (2016). Patient relates no major problem with feet. Only complaints today consist of yearly comprehensive diabetic  foot examination  .    PCP: Kam Phipps MD    Date Last Seen by PCP:   Chief Complaint   Patient presents with    Annual Exam     [facility] 4/23/2021         Current shoe gear: Casual shoes    Hemoglobin A1C   Date Value Ref Range Status   10/07/2021 6.8 (H) 4.0 - 5.6 % Final     Comment:     ADA Screening Guidelines:  5.7-6.4%  Consistent with prediabetes  >or=6.5%  Consistent with diabetes    High levels of fetal hemoglobin interfere with the HbA1C  assay. Heterozygous hemoglobin variants (HbS, HgC, etc)do  not significantly interfere with this assay.   However, presence of multiple variants may affect accuracy.     04/08/2021 6.7 (H) 4.0 - 5.6 % Final     Comment:     ADA Screening Guidelines:  5.7-6.4%  Consistent with prediabetes  >or=6.5%  Consistent with diabetes    High levels of fetal hemoglobin interfere with the HbA1C  assay. Heterozygous hemoglobin variants (HbS, HgC, etc)do  not significantly interfere with this assay.   However, presence of multiple variants may affect accuracy.     09/21/2020 6.5 (H) 4.0 - 5.6 % Final     Comment:     ADA Screening Guidelines:  5.7-6.4%  Consistent with prediabetes  >or=6.5%  Consistent with diabetes  High levels of fetal hemoglobin interfere with the HbA1C  assay. Heterozygous hemoglobin variants (HbS, HgC, etc)do  not significantly interfere with this assay.   However, presence of multiple variants may affect accuracy.             Review of Systems   Constitutional: Negative for  chills, decreased appetite and fever.   Cardiovascular: Negative for leg swelling.   Musculoskeletal: Negative for arthritis, joint pain, joint swelling and myalgias.   Gastrointestinal: Negative for nausea and vomiting.   Neurological: Negative for loss of balance, numbness and paresthesias.           Objective:      Physical Exam  Vitals and nursing note reviewed.   Constitutional:       General: He is not in acute distress.     Appearance: He is well-developed and well-nourished. He is not toxic-appearing, sickly-appearing or diaphoretic.      Comments: alert and oriented x 3.    Cardiovascular:      Pulses:           Dorsalis pedis pulses are 2+ on the right side and 2+ on the left side.        Posterior tibial pulses are 2+ on the right side and 2+ on the left side.      Comments:  Capillary refill time is within normal limits. Digital hair present.   Pulmonary:      Effort: No respiratory distress.   Musculoskeletal:         General: No deformity.      Right ankle: No tenderness. No lateral malleolus, medial malleolus, AITF ligament, CF ligament or posterior TF ligament tenderness.      Right Achilles Tendon: No pain or defects. Buckley's test negative.      Left ankle: No tenderness. No lateral malleolus, medial malleolus, AITF ligament, CF ligament or posterior TF ligament tenderness.      Left Achilles Tendon: No pain or defects. Buckley's test negative.      Right foot: No tenderness or bony tenderness.      Left foot: No tenderness or bony tenderness.      Comments: Adequate joint range of motion without pain, limitation, nor crepitation Bilateral feet and ankle joints. Muscle strength is 5/5 in all groups bilaterally.           Feet:      Right foot:      Protective Sensation: 5 sites tested. 5 sites sensed.      Left foot:      Protective Sensation: 5 sites tested. 5 sites sensed.   Lymphadenopathy:      Comments: No lymphatic streaking     Skin:     General: Skin is warm, dry and intact.       Coloration: Skin is not pale.      Findings: No rash.      Nails: There is no clubbing or cyanosis.      Comments: Skin is of normal turgor.   Normal temperature gradient.  Examination of the skin reveals no evidence of significant rashes, open lesions, suspicious appearing nevi or other concerning lesions.      Toenails 1-5 bilaterally are neatly trimmed; of normal color and thickness     Neurological:      Sensory: No sensory deficit.      Motor: No atrophy.      Comments: Light touch present     Psychiatric:         Attention and Perception: He is attentive.         Mood and Affect: Mood is not anxious. Affect is not inappropriate.         Speech: He is communicative. Speech is not slurred.         Behavior: Behavior is not combative.               Assessment:       Encounter Diagnosis   Name Primary?    Type 2 diabetes mellitus without complication, without long-term current use of insulin Yes         Plan:       Osmel was seen today for annual exam.    Diagnoses and all orders for this visit:    Type 2 diabetes mellitus without complication, without long-term current use of insulin      I counseled the patient on his conditions, their implications and medical management.      - Shoe inspection. Diabetic Foot Education. Patient reminded of the importance of good nutrition and blood sugar control to help prevent podiatric complications of diabetes. Patient instructed on proper foot hygeine. We discussed wearing proper shoe gear, daily foot inspections, never walking without protective shoe gear, caution putting sharp instruments to feet     - Discussed DM foot care:  Wear comfortable, proper fitting shoes. Wash feet daily. Dry well. After drying, apply moisturizer to feet (no lotion to webspaces). Inspect feet daily for skin breaks, blisters, swelling, or redness. Wear cotton socks (preferably white)  Change socks every day. Do NOT walk barefoot. Do NOT use heating pads or warm/hot water soaks     - Discussed  importance of daily moisturizer to the feet such as Gold bonds diabetic foot cream    - Patient is low risk for developing lower extremity issues secondary to diabetes. I recommend continued yearly diabetic foot examinations.     - Patients PCP can perform yearly foot checks . Currently  patient has no pedal manifestations of DM    - Patients with out pedal manifestations of DM, do not qualify for nail/callus trimming     - RTC in  PRN     .

## 2022-01-24 ENCOUNTER — OFFICE VISIT (OUTPATIENT)
Dept: PAIN MEDICINE | Facility: CLINIC | Age: 52
End: 2022-01-24
Payer: COMMERCIAL

## 2022-01-24 DIAGNOSIS — M54.12 CERVICAL RADICULOPATHY: ICD-10-CM

## 2022-01-24 DIAGNOSIS — G89.29 OTHER CHRONIC PAIN: ICD-10-CM

## 2022-01-24 DIAGNOSIS — M50.30 DDD (DEGENERATIVE DISC DISEASE), CERVICAL: Primary | ICD-10-CM

## 2022-01-24 PROCEDURE — 99213 PR OFFICE/OUTPT VISIT, EST, LEVL III, 20-29 MIN: ICD-10-PCS | Mod: 95,,, | Performed by: NURSE PRACTITIONER

## 2022-01-24 PROCEDURE — 1159F MED LIST DOCD IN RCRD: CPT | Mod: CPTII,95,, | Performed by: NURSE PRACTITIONER

## 2022-01-24 PROCEDURE — 3061F PR NEG MICROALBUMINURIA RESULT DOCUMENTED/REVIEW: ICD-10-PCS | Mod: CPTII,95,, | Performed by: NURSE PRACTITIONER

## 2022-01-24 PROCEDURE — 1160F RVW MEDS BY RX/DR IN RCRD: CPT | Mod: CPTII,95,, | Performed by: NURSE PRACTITIONER

## 2022-01-24 PROCEDURE — 1159F PR MEDICATION LIST DOCUMENTED IN MEDICAL RECORD: ICD-10-PCS | Mod: CPTII,95,, | Performed by: NURSE PRACTITIONER

## 2022-01-24 PROCEDURE — 1160F PR REVIEW ALL MEDS BY PRESCRIBER/CLIN PHARMACIST DOCUMENTED: ICD-10-PCS | Mod: CPTII,95,, | Performed by: NURSE PRACTITIONER

## 2022-01-24 PROCEDURE — 3066F PR DOCUMENTATION OF TREATMENT FOR NEPHROPATHY: ICD-10-PCS | Mod: CPTII,95,, | Performed by: NURSE PRACTITIONER

## 2022-01-24 PROCEDURE — 3066F NEPHROPATHY DOC TX: CPT | Mod: CPTII,95,, | Performed by: NURSE PRACTITIONER

## 2022-01-24 PROCEDURE — 3061F NEG MICROALBUMINURIA REV: CPT | Mod: CPTII,95,, | Performed by: NURSE PRACTITIONER

## 2022-01-24 PROCEDURE — 99213 OFFICE O/P EST LOW 20 MIN: CPT | Mod: 95,,, | Performed by: NURSE PRACTITIONER

## 2022-01-24 NOTE — PROGRESS NOTES
Subjective:     Patient ID: Osmel Nair is a 51 y.o. male.    Chief Complaint: Pain    Consulted by: Self    Disclaimer: This note was generated using voice recognition software.  There may be typographical errors that were missed during proofreading.       Previous HPI interval history from visit with Dr. Lee, who is no longer with [facility]           SUBJECTIVE:     Osmel Nair is a 51 y.o. male with hx of T2DM who presents to the clinic for the evaluation of left shoulder pain. The pain started 5 weeks ago and symptoms have been persistent. No trauma or inciting event. The pain is located in the left upper trapezius area and radiates down the arm into the thumb and index finger. The pain initially involved the neck although this has resolved.  The arm and hand pain is described as numb and tingling. The shoulder pain is described as sharp and shooting and is rated as 2/10. The pain is rated with a score of  2/10 on the BEST day and a score of 8/10 on the WORST day.  Symptoms interfere with daily activity, sleeping and work. The pain is exacerbated by sleeping, lots of physical exertion, and manual labor.  The pain is mitigated by rest and medication. He reports spending 0 hours per day reclining. The patient reports 6-8 hours of uninterrupted sleep per night.     Patient denies urinary incontinence, bowel incontinence, significant weight loss, significant motor weakness and loss of sensations.    INTERVAL HISTORY (10/22/2018):     Osmel Nair presents to the clinic today for a follow-up appointment for neck pain. The patient reports 100% pain relief after cervical MARIA LUZ performed on 10/30/2017 which lasted 11 months. He reports the pain in his neck is gradually starting to return. The pain started one week ago. No injury or inciting event.The pain is located in the left cervical paraspinal area and radiates into the left shoulder. Current pain intensity is 2/10.  He describes it as sharp  sensation. The pain is exacerbated by cycling and head turning. Aleve provides mild-moderate relief.     Patient denies urinary incontinence, bowel incontinence, significant weight loss, significant motor weakness and loss of sensations.    Interval History (8/8/2019):  He returns today for follow up.  He reports that the Medrol Dosepak given at his most recent visit in 10/2018 provided very good relief for ~7.5 months, then similar pain as before returned. Pain starts in his left neck with consistent radiation to the left trap and shoulder region, occasionally down to the elbow, and also with occasional paresthesias in the left index and thumb. Denies weakness. Denies bowel or bladder incontinence. Cervical MARIA LUZ x1, oral steroids and NSAIDs, and yoga has been helpful for the pain.    Interval History (9/27/2019):  The patient is here for follow up.  He is s/p cervical MARIA LUZ on 8/30/19 with 80% relief.  His pain is now mild.  He has no neck pain.  He has some numbness and pain to left hand intermittently, mainly with turning his head.  He has been working on stretching.  He stopped Lodine because he found limited benefit.  He takes Tylenol as needed which is helpful.  His pain today is 2/10.    Interval History (1/4/2022):  The patient is here today to discuss acute on chronic neck pain.  He has had longstanding neck pain secondary to neuroforaminal narrowing of the cervical spine for many years.  He states that over the past 2 years, he has been able to control his pain with over-the-counter medications and physical therapy exercises.  He has currently been performing home physical therapy exercises 3-4 days weekly for the past 12 weeks.  However, he has had significant pain over this time.  He did have excellent benefit with cervical epidurals in the past, with the last being over 2 years ago.  He wishes to repeat at this time.  His primary complaint is neck pain with radiation down the right arm and associated  numbness and tingling to digits 1 through 3. No new weakness or dropping of objects.  No bowel or bladder incontinence.  His pain today is 8/10.    Interval History 1/24/2022:  The patient has a virtual follow up for neck pain. He is s/p C7/T1 IL MARIA LUZ on 1/10/22 with 85% relief. He still has some numbness to right middle finger but otherwise feels significantly improved. His pain today is 2/10. No other complaints at this time.     Aggravating factors: standing for long periods, sitting at a desk for long periods    Mitigating factors: medications, raising arm above head improves pain    Previously seeing: Dr Mata, Dr. Ulloa    Physical Therapy: PT exercises 3-4 days weekly for past 12 weeks.    Non-pharmacologic Treatment:     · Ice/Heat: Heat partially effective  · TENS: hasn't tried  · Massage: hasnt tried  · Chiropractic care: hasnt tried  · Acupuncture: hasnt tried  · Other: Yoga is helpful, attends at least weekly         Pain Medications:         · Currently taking: OTC Tylenol PRN    · Has tried in the past:    · Opioids: hasnt tried  · NSAIDS: Naproxen and Lodine with minimal relief  · Tylenol: minimal relief  · Muscle relaxants: hasnt tried  · TCAs: hasnt tried  · SNRIs: hasnt tried  · Anticonvulsants: Gabapentin 300 BID, not helpful, experienced paresthesias in both hands stopped  · topical creams: icy hot provided temporary relief  · Other: none    Blood thinners: no    Interventional Therapies:   · 10/30/2017:  C7/T1 MARIA LUZ:  100% pain relief for 11 months  · 8/30/2017 C7/T1 MARIA LUZ: 80% relief  · 1/10/2022: C7/T1 IL MARIA LUZ- 85% relief    Relevant Surgeries: none    Affecting sleep? yes    Affecting daily activities? yes    Depressive symptoms? denies          · SI/HI? No    Work status: works as  here in [place]    Prescription Monitoring Program database:  Reviewed and consistent with medication use as prescribed. (No record or any controlled substance RXs)    Last 3 PDI Scores 1/4/2022  9/27/2019 8/8/2019   Pain Disability Index (PDI) 46 [phone] 43       Opioid Risk Score     None          GENERAL:  No weight loss, malaise or fevers.  HEENT:   No recent changes in vision or hearing  NECK:  Negative for lumps, no difficulty with swallowing.  RESPIRATORY:  Negative for cough, wheezing or shortness of breath, patient denies any recent URI.  CARDIOVASCULAR:  Negative for chest pain, leg swelling or palpitations.  GI:  Negative for abdominal discomfort, blood in stools or black stools or change in bowel habits.  MUSCULOSKELETAL:  See HPI.  SKIN:  Negative for lesions, rash, and itching.  PSYCH:  Negative for mood disorder or recent psychosocial stressors.    HEMATOLOGY/LYMPHOLOGY:  Negative for prolonged bleeding, bruising easily or swollen nodes.    ENDO: +diabetes, denies thyroid dysfunction  NEURO:   + history of stage I lyme disease treated with antibiotics. No history of headaches, syncope, paralysis, seizures or tremors.  All other reviewed and negative other than HPI.          Past Medical History:   Diagnosis Date    Diabetes mellitus, type 2 07/2016    Lyme disease 2016       Past Surgical History:   Procedure Laterality Date    EPIDURAL STEROID INJECTION N/A 8/30/2019    Procedure: INJECTION, STEROID, EPIDURAL, CERVICAL C7-T1 INTERLAMINAR need consent;  Surgeon: Uzma Ulloa MD;  Location: [facility] [place];  Service: Pain Management;  Laterality: N/A;    EPIDURAL STEROID INJECTION N/A 1/10/2022    Procedure: Injection, Steroid, Epidural C7/T1;  Surgeon: Otis Galindo MD;  Location: [facility];  Service: Pain Management;  Laterality: N/A;    KNEE ARTHROSCOPY Right 1989       Review of patient's allergies indicates:  No Known Allergies    Current Outpatient Medications   Medication Sig Dispense Refill    CONTOUR NEXT TEST STRIPS Strp USE THREE TIMES DAILY 300 strip 0    metFORMIN (GLUCOPHAGE) 500 MG tablet Take 1 tablet (500 mg total) by mouth 2 (two) times daily with meals. 180 tablet 3     rosuvastatin (CRESTOR) 5 MG tablet Take 1 tablet (5 mg total) by mouth once daily. 90 tablet 0     No current facility-administered medications for this visit.       Family History   Problem Relation Age of Onset    Diabetes Mother     Diabetes Father     Diabetes Sister        Social History     Socioeconomic History    Marital status: Single   Tobacco Use    Smoking status: Current Every Day Smoker    Smokeless tobacco: Never Used   Substance and Sexual Activity    Alcohol use: Yes       Objective:     PHYSICAL EXAMINATION:    General appearance: Well appearing, in no acute distress, alert and oriented x3.  Psych:  Mood and affect appropriate.  Skin: Skin color normal, no rashes or lesions, in both upper and lower body.  Extremities: Moves all visualized extremities freely.    GEN:  Well developed, well nourished.  No acute distress.  No pain behavior.  HEENT:  No trauma.  Mucous membranes moist.  Nares patent bilaterally.  PSYCH: Normal affect. Thought content appropriate.  CHEST:  Breathing symmetric.  No audible wheezing.  ABD: Soft, non-distended.  SKIN:  Warm, pink, dry.  No rash on exposed areas.    EXT:  No cyanosis, clubbing, or edema.  No color change or changes in nail or hair growth.  5/5 motor strength throughout upper extremities.   Sensory: no sensory deficit in the upper extremities.  NEURO/MUSCULOSKELETAL:  Fully alert, oriented, and appropriate. Speech normal alejandro. No cranial nerve deficits.   Gait: normal gait, no AD.     Reflexes: 2+ and symmetric throughout.  Absent Holly's bilaterally.  C-Spine: Full ROM with pain on flexion and lateral rotation.  Negative facet loading.  Positive Spurling on the right. TTP over trapezius muscles on the right.  Neuro: Decreased sensation in a right C6/7 distribution.       Imaging:        The imaging studies listed below were independently reviewed by me, and I agree with the findings as documented below.     MRI C-spine  10/6/2017:  Findings:    There is straightening of the normal cervical lordosis. The vertebral body heights and alignment are within normal limits. Intervertebral disk spaces are well preserved. Bone marrow signal is normal.    Visualized posterior fossa structures and cervical cord are unremarkable.    Limited evaluation of the neck soft tissues is unremarkable.    C2-3: No spinal canal stenosis or neuroforaminal narrowing.    C3-4: Posterior disc osteophyte complex formation and uncovertebral arthrosis resulting in mild bilateral neuroforaminal narrowing and no spinal canal stenosis.    C4-5: Posterior disc osteophyte complex formation and uncovertebral arthrosis resulting in mild bilateral neuroforaminal narrowing and no spinal canal stenosis.    C5-6: Posterior disc osteophyte complex formation with uncovertebral arthrosis resulting in moderate left and mild right neuroforaminal narrowing. No spinal canal stenosis    C6-7: Posterior disc osteophyte complex formation without spinal canal stenosis or neuroforaminal narrowing.      Impression       Mild cervical spondylosis with varying degrees of mild to moderate neuroforaminal narrowing, as described above.       Assessment:     Encounter Diagnoses   Name Primary?    DDD (degenerative disc disease), cervical Yes    Cervical radiculopathy     Other chronic pain        Plan:     Diagnoses and all orders for this visit:    DDD (degenerative disc disease), cervical    Cervical radiculopathy    Other chronic pain         His neck pain is consistent with the above.    We discussed the assessment and recommendations.  All available images were reviewed. We discussed the disease process, prognosis, treatment plan, and risks and benefits. The patient is aware of the risks and benefits of the medications being prescribed, common side effects, and proper usage. The following is the plan we agreed on:     1. The patient is s/p repeat C7/T1 IL MARIA LUZ with benefit. Will  continue to monitor progress.   2. Can continue over-the-counter medications as needed.  3. Continue yoga and home physical therapy exercises.  4. RTC as needed.        Leola Schneider, KIMBERLY  01/24/2022

## 2022-02-17 ENCOUNTER — PATIENT OUTREACH (OUTPATIENT)
Dept: ADMINISTRATIVE | Facility: HOSPITAL | Age: 52
End: 2022-02-17
Payer: COMMERCIAL

## 2022-02-23 ENCOUNTER — OFFICE VISIT (OUTPATIENT)
Dept: INTERNAL MEDICINE | Facility: CLINIC | Age: 52
End: 2022-02-23
Attending: FAMILY MEDICINE
Payer: COMMERCIAL

## 2022-02-23 VITALS
HEART RATE: 92 BPM | BODY MASS INDEX: 28.39 KG/M2 | HEIGHT: 70 IN | WEIGHT: 198.31 LBS | OXYGEN SATURATION: 97 % | DIASTOLIC BLOOD PRESSURE: 80 MMHG | SYSTOLIC BLOOD PRESSURE: 120 MMHG

## 2022-02-23 DIAGNOSIS — E78.01 HYPERLIPIDEMIA TYPE II: ICD-10-CM

## 2022-02-23 DIAGNOSIS — Z00.00 PREVENTATIVE HEALTH CARE: Primary | ICD-10-CM

## 2022-02-23 DIAGNOSIS — E11.9 TYPE 2 DIABETES MELLITUS WITHOUT COMPLICATION, WITHOUT LONG-TERM CURRENT USE OF INSULIN: ICD-10-CM

## 2022-02-23 DIAGNOSIS — Z12.11 SCREENING FOR COLON CANCER: ICD-10-CM

## 2022-02-23 PROCEDURE — 99999 PR PBB SHADOW E&M-EST. PATIENT-LVL III: CPT | Mod: PBBFAC,,, | Performed by: FAMILY MEDICINE

## 2022-02-23 PROCEDURE — 3079F PR MOST RECENT DIASTOLIC BLOOD PRESSURE 80-89 MM HG: ICD-10-PCS | Mod: CPTII,S$GLB,, | Performed by: FAMILY MEDICINE

## 2022-02-23 PROCEDURE — 3066F NEPHROPATHY DOC TX: CPT | Mod: CPTII,S$GLB,, | Performed by: FAMILY MEDICINE

## 2022-02-23 PROCEDURE — 3079F DIAST BP 80-89 MM HG: CPT | Mod: CPTII,S$GLB,, | Performed by: FAMILY MEDICINE

## 2022-02-23 PROCEDURE — 1160F PR REVIEW ALL MEDS BY PRESCRIBER/CLIN PHARMACIST DOCUMENTED: ICD-10-PCS | Mod: CPTII,S$GLB,, | Performed by: FAMILY MEDICINE

## 2022-02-23 PROCEDURE — 3074F PR MOST RECENT SYSTOLIC BLOOD PRESSURE < 130 MM HG: ICD-10-PCS | Mod: CPTII,S$GLB,, | Performed by: FAMILY MEDICINE

## 2022-02-23 PROCEDURE — 3061F NEG MICROALBUMINURIA REV: CPT | Mod: CPTII,S$GLB,, | Performed by: FAMILY MEDICINE

## 2022-02-23 PROCEDURE — 3061F PR NEG MICROALBUMINURIA RESULT DOCUMENTED/REVIEW: ICD-10-PCS | Mod: CPTII,S$GLB,, | Performed by: FAMILY MEDICINE

## 2022-02-23 PROCEDURE — 99999 PR PBB SHADOW E&M-EST. PATIENT-LVL III: ICD-10-PCS | Mod: PBBFAC,,, | Performed by: FAMILY MEDICINE

## 2022-02-23 PROCEDURE — 3074F SYST BP LT 130 MM HG: CPT | Mod: CPTII,S$GLB,, | Performed by: FAMILY MEDICINE

## 2022-02-23 PROCEDURE — 3008F PR BODY MASS INDEX (BMI) DOCUMENTED: ICD-10-PCS | Mod: CPTII,S$GLB,, | Performed by: FAMILY MEDICINE

## 2022-02-23 PROCEDURE — 1159F PR MEDICATION LIST DOCUMENTED IN MEDICAL RECORD: ICD-10-PCS | Mod: CPTII,S$GLB,, | Performed by: FAMILY MEDICINE

## 2022-02-23 PROCEDURE — 1160F RVW MEDS BY RX/DR IN RCRD: CPT | Mod: CPTII,S$GLB,, | Performed by: FAMILY MEDICINE

## 2022-02-23 PROCEDURE — 99396 PR PREVENTIVE VISIT,EST,40-64: ICD-10-PCS | Mod: S$GLB,,, | Performed by: FAMILY MEDICINE

## 2022-02-23 PROCEDURE — 1159F MED LIST DOCD IN RCRD: CPT | Mod: CPTII,S$GLB,, | Performed by: FAMILY MEDICINE

## 2022-02-23 PROCEDURE — 3008F BODY MASS INDEX DOCD: CPT | Mod: CPTII,S$GLB,, | Performed by: FAMILY MEDICINE

## 2022-02-23 PROCEDURE — 99396 PREV VISIT EST AGE 40-64: CPT | Mod: S$GLB,,, | Performed by: FAMILY MEDICINE

## 2022-02-23 PROCEDURE — 3066F PR DOCUMENTATION OF TREATMENT FOR NEPHROPATHY: ICD-10-PCS | Mod: CPTII,S$GLB,, | Performed by: FAMILY MEDICINE

## 2022-02-23 RX ORDER — ROSUVASTATIN CALCIUM 5 MG/1
5 TABLET, COATED ORAL DAILY
Qty: 90 TABLET | Refills: 3 | Status: SHIPPED | OUTPATIENT
Start: 2022-02-23 | End: 2022-12-07 | Stop reason: SDUPTHER

## 2022-02-23 NOTE — PROGRESS NOTES
"CHIEF COMPLAINT:  Diabetes follow-up    HISTORY OF PRESENT ILLNESS: The patient is a 51 year-old white male.  She  He is currently on metformin and Crestor.     The patient has a history of diabetes.  Recent blood sugars have been less than 200.  There have been no episodes of hypoglycemia.    The patient has a history of stable hyperlipidemia on current medications.  The patient denies chest pain or shortness of breath today.  The patient denies muscle aches or myalgias suggestive of myositis.    REVIEW OF SYSTEMS:   GENERAL: No fever, chills, weight loss.   SKIN: No rashes, itching or changes in color or texture of skin.   HEAD: No headaches or recent head trauma.   EYES: Visual acuity fine. No photophobia, ocular pain or diplopia.   EARS: Denies ear pain, discharge or vertigo.   NOSE: No loss of smell, no epistaxis or postnasal drip.   MOUTH & THROAT: No hoarseness or change in voice. No excessive gum bleeding.   NODES: Denies swollen glands.   CHEST: Denies JASMINE, cyanosis, wheezing, cough and sputum production.   CARDIOVASCULAR: Denies chest pain, PND, orthopnea or reduced exercise tolerance.   ABDOMEN: Appetite fine. No weight loss. Denies diarrhea, abdominal pain, hematemesis or blood in stool.   URINARY: No flank pain, dysuria or hematuria.   PERIPHERAL VASCULAR: No claudication or cyanosis.   MUSCULOSKELETAL: No joint stiffness or swelling. Denies back pain.   NEUROLOGIC: No history of seizures, paralysis, alteration of gait or coordination.     SOCIAL HISTORY: The patient does not smoke. The patient consumes alcohol socially. The patient is single. He is an     PHYSICAL EXAMINATION:   Blood pressure 120/80, pulse 92, height 5' 10" (1.778 m), weight 89.9 kg (198 lb 4.9 oz), SpO2 97 %.    APPEARANCE: Well nourished, well developed, in no acute distress.   HEAD: Normocephalic, atraumatic.   EYES: PERRL. EOMI. Conjunctivae without injection and anicteric   NOSE: Mucosa pink. Airway clear.   MOUTH & " THROAT: No tonsillar enlargement. No pharyngeal erythema or exudate. No stridor.   NECK: Supple.   NODES: No cervical, axillary or inguinal lymph node enlargement.   CHEST: Lungs clear to auscultation. No retractions are noted. No rales or rhonchi are present.   CARDIOVASCULAR: Normal S1, S2. No rubs, murmurs or gallops.   ABDOMEN: Bowel sounds normal. Not distended. Soft. No tenderness or masses. No ascites is noted.   MUSCULOSKELETAL: There is no clubbing, cyanosis, or edema of the extremities x4. There is full range of motion of the lumbar spine. There is full range of motion of the extremities x4. There is no deformity noted.   NEUROLOGIC:   Normal speech development.   Hearing normal.   Normal gait.   Motor and sensory exams grossly normal.   PSYCHIATRIC: Patient is alert and oriented x3. Thought processes are all normal. There is no homicidality. There is no suicidality. There is no evidence of psychosis.     LABORATORY/RADIOLOGY: Chart reviewed.      ASSESSMENT:   Annual  Type 2 diabetes, condition is well controlled on current medication regimen  Hyperlipidemia   Family [id number] of heart disease    PLAN:   Return to clinic in 6 months with blood work prior

## 2022-03-13 LAB — NONINV COLON CA DNA+OCC BLD SCRN STL QL: NEGATIVE

## 2022-03-17 ENCOUNTER — OFFICE VISIT (OUTPATIENT)
Dept: OPTOMETRY | Facility: CLINIC | Age: 52
End: 2022-03-17
Payer: COMMERCIAL

## 2022-03-17 DIAGNOSIS — E11.9 DIABETES MELLITUS WITHOUT COMPLICATION: ICD-10-CM

## 2022-03-17 PROCEDURE — 99999 PR PBB SHADOW E&M-EST. PATIENT-LVL III: CPT | Mod: PBBFAC,,, | Performed by: OPTOMETRIST

## 2022-03-17 PROCEDURE — 3066F NEPHROPATHY DOC TX: CPT | Mod: CPTII,S$GLB,, | Performed by: OPTOMETRIST

## 2022-03-17 PROCEDURE — 99999 PR PBB SHADOW E&M-EST. PATIENT-LVL III: ICD-10-PCS | Mod: PBBFAC,,, | Performed by: OPTOMETRIST

## 2022-03-17 PROCEDURE — 3061F PR NEG MICROALBUMINURIA RESULT DOCUMENTED/REVIEW: ICD-10-PCS | Mod: CPTII,S$GLB,, | Performed by: OPTOMETRIST

## 2022-03-17 PROCEDURE — 3066F PR DOCUMENTATION OF TREATMENT FOR NEPHROPATHY: ICD-10-PCS | Mod: CPTII,S$GLB,, | Performed by: OPTOMETRIST

## 2022-03-17 PROCEDURE — 1159F PR MEDICATION LIST DOCUMENTED IN MEDICAL RECORD: ICD-10-PCS | Mod: CPTII,S$GLB,, | Performed by: OPTOMETRIST

## 2022-03-17 PROCEDURE — 92004 COMPRE OPH EXAM NEW PT 1/>: CPT | Mod: S$GLB,,, | Performed by: OPTOMETRIST

## 2022-03-17 PROCEDURE — 1159F MED LIST DOCD IN RCRD: CPT | Mod: CPTII,S$GLB,, | Performed by: OPTOMETRIST

## 2022-03-17 PROCEDURE — 92004 PR EYE EXAM, NEW PATIENT,COMPREHESV: ICD-10-PCS | Mod: S$GLB,,, | Performed by: OPTOMETRIST

## 2022-03-17 PROCEDURE — 2023F PR DILATED RETINAL EXAM W/O EVID OF RETINOPATHY: ICD-10-PCS | Mod: CPTII,S$GLB,, | Performed by: OPTOMETRIST

## 2022-03-17 PROCEDURE — 2023F DILAT RTA XM W/O RTNOPTHY: CPT | Mod: CPTII,S$GLB,, | Performed by: OPTOMETRIST

## 2022-03-17 PROCEDURE — 3061F NEG MICROALBUMINURIA REV: CPT | Mod: CPTII,S$GLB,, | Performed by: OPTOMETRIST

## 2022-03-17 NOTE — PROGRESS NOTES
HPI     Patient is here today for a Diabetic eye exam  Patient current glasses are two year old and not having any problem with   [facility] in glasses  Patient doesn't have an vision complaints or concerns.    Hemoglobin A1C       Date                     Value               Ref Range             Status                10/07/2021               6.8 (H)             4.0 - 5.6 %           Final              Last edited by Marshall Saucedo MA on 3/17/2022  2:17 PM. (History)            Assessment /Plan     For exam results, see Encounter Report.    Diabetes mellitus without complication  -     Ambulatory referral/consult to Optometry  -No retinopathy noted today.  Continued control with primary care physician and annual comprehensive eye exam.      RTC 1 yr

## 2022-05-01 ENCOUNTER — PATIENT MESSAGE (OUTPATIENT)
Dept: INTERNAL MEDICINE | Facility: CLINIC | Age: 52
End: 2022-05-01
Payer: COMMERCIAL

## 2022-05-02 ENCOUNTER — PATIENT MESSAGE (OUTPATIENT)
Dept: INTERNAL MEDICINE | Facility: CLINIC | Age: 52
End: 2022-05-02
Payer: COMMERCIAL

## 2022-05-06 ENCOUNTER — LAB VISIT (OUTPATIENT)
Dept: LAB | Facility: OTHER | Age: 52
End: 2022-05-06
Attending: FAMILY MEDICINE
Payer: COMMERCIAL

## 2022-05-06 DIAGNOSIS — E78.01 HYPERLIPIDEMIA TYPE II: ICD-10-CM

## 2022-05-06 DIAGNOSIS — E11.9 TYPE 2 DIABETES MELLITUS WITHOUT COMPLICATION, WITHOUT LONG-TERM CURRENT USE OF INSULIN: ICD-10-CM

## 2022-05-06 DIAGNOSIS — Z12.11 SCREENING FOR COLON CANCER: ICD-10-CM

## 2022-05-06 DIAGNOSIS — Z00.00 PREVENTATIVE HEALTH CARE: ICD-10-CM

## 2022-05-06 LAB
ALBUMIN SERPL BCP-MCNC: 4.5 G/DL (ref 3.5–5.2)
ALP SERPL-CCNC: 78 U/L (ref 55–135)
ALT SERPL W/O P-5'-P-CCNC: 31 U/L (ref 10–44)
ANION GAP SERPL CALC-SCNC: 11 MMOL/L (ref 8–16)
AST SERPL-CCNC: 23 U/L (ref 10–40)
BILIRUB SERPL-MCNC: 0.9 MG/DL (ref 0.1–1)
BUN SERPL-MCNC: 14 MG/DL (ref 6–20)
CALCIUM SERPL-MCNC: 10 MG/DL (ref 8.7–10.5)
CHLORIDE SERPL-SCNC: 101 MMOL/L (ref 95–110)
CHOLEST SERPL-MCNC: 203 MG/DL (ref 120–199)
CHOLEST/HDLC SERPL: 4.1 {RATIO} (ref 2–5)
CO2 SERPL-SCNC: 27 MMOL/L (ref 23–29)
CREAT SERPL-MCNC: 1 MG/DL (ref 0.5–1.4)
EST. GFR  (AFRICAN AMERICAN): >60 ML/MIN/1.73 M^2
EST. GFR  (NON AFRICAN AMERICAN): >60 ML/MIN/1.73 M^2
ESTIMATED AVG GLUCOSE: 151 MG/DL (ref 68–131)
GLUCOSE SERPL-MCNC: 152 MG/DL (ref 70–110)
HBA1C MFR BLD: 6.9 % (ref 4–5.6)
HDLC SERPL-MCNC: 50 MG/DL (ref 40–75)
HDLC SERPL: 24.6 % (ref 20–50)
LDLC SERPL CALC-MCNC: 120.2 MG/DL (ref 63–159)
NONHDLC SERPL-MCNC: 153 MG/DL
POTASSIUM SERPL-SCNC: 4.3 MMOL/L (ref 3.5–5.1)
PROT SERPL-MCNC: 7.5 G/DL (ref 6–8.4)
SODIUM SERPL-SCNC: 139 MMOL/L (ref 136–145)
TRIGL SERPL-MCNC: 164 MG/DL (ref 30–150)
TSH SERPL DL<=0.005 MIU/L-ACNC: 2.36 UIU/ML (ref 0.4–4)

## 2022-05-06 PROCEDURE — 36415 COLL VENOUS BLD VENIPUNCTURE: CPT | Performed by: FAMILY MEDICINE

## 2022-05-06 PROCEDURE — 83036 HEMOGLOBIN GLYCOSYLATED A1C: CPT | Performed by: FAMILY MEDICINE

## 2022-05-06 PROCEDURE — 84443 ASSAY THYROID STIM HORMONE: CPT | Performed by: FAMILY MEDICINE

## 2022-05-06 PROCEDURE — 80053 COMPREHEN METABOLIC PANEL: CPT | Performed by: FAMILY MEDICINE

## 2022-05-06 PROCEDURE — 80061 LIPID PANEL: CPT | Performed by: FAMILY MEDICINE

## 2022-05-12 ENCOUNTER — PATIENT MESSAGE (OUTPATIENT)
Dept: SMOKING CESSATION | Facility: CLINIC | Age: 52
End: 2022-05-12
Payer: COMMERCIAL

## 2022-05-19 ENCOUNTER — IMMUNIZATION (OUTPATIENT)
Dept: INTERNAL MEDICINE | Facility: CLINIC | Age: 52
End: 2022-05-19
Payer: COMMERCIAL

## 2022-05-19 DIAGNOSIS — Z23 NEED FOR VACCINATION: Primary | ICD-10-CM

## 2022-05-19 PROCEDURE — 91305 COVID-19, MRNA, LNP-S, PF, 30 MCG/0.3 ML DOSE VACCINE (PFIZER): CPT | Mod: PBBFAC | Performed by: INTERNAL MEDICINE

## 2022-05-30 ENCOUNTER — PATIENT MESSAGE (OUTPATIENT)
Dept: SMOKING CESSATION | Facility: CLINIC | Age: 52
End: 2022-05-30
Payer: COMMERCIAL

## 2022-06-20 ENCOUNTER — CLINICAL SUPPORT (OUTPATIENT)
Dept: SMOKING CESSATION | Facility: CLINIC | Age: 52
End: 2022-06-20
Payer: COMMERCIAL

## 2022-06-20 DIAGNOSIS — F17.210 LIGHT CIGARETTE SMOKER (1-9 CIGS/DAY): Primary | ICD-10-CM

## 2022-06-20 PROCEDURE — 99404 PR PREVENT COUNSEL,INDIV,60 MIN: ICD-10-PCS | Mod: S$GLB,,,

## 2022-06-20 PROCEDURE — 99999 PR PBB SHADOW E&M-EST. PATIENT-LVL I: ICD-10-PCS | Mod: PBBFAC,,,

## 2022-06-20 PROCEDURE — 99999 PR PBB SHADOW E&M-EST. PATIENT-LVL I: CPT | Mod: PBBFAC,,,

## 2022-06-20 PROCEDURE — 99404 PREV MED CNSL INDIV APPRX 60: CPT | Mod: S$GLB,,,

## 2022-06-20 RX ORDER — MICONAZOLE NITRATE 2 %
2 CREAM (GRAM) TOPICAL
Qty: 200 EACH | Refills: 0 | Status: SHIPPED | OUTPATIENT
Start: 2022-06-20 | End: 2024-02-08

## 2022-06-20 RX ORDER — IBUPROFEN 200 MG
1 TABLET ORAL DAILY
Qty: 28 PATCH | Refills: 0 | Status: SHIPPED | OUTPATIENT
Start: 2022-06-20 | End: 2022-07-21 | Stop reason: SDUPTHER

## 2022-06-20 NOTE — Clinical Note
Patient was seen for tobacco cessation intake.  Patient will begin on 14 mg nicotine patches and 2 mg nicotine lozenge.  Patient has agreed to biweekly follow up.

## 2022-07-21 ENCOUNTER — CLINICAL SUPPORT (OUTPATIENT)
Dept: SMOKING CESSATION | Facility: CLINIC | Age: 52
End: 2022-07-21
Payer: COMMERCIAL

## 2022-07-21 DIAGNOSIS — F17.210 LIGHT CIGARETTE SMOKER (1-9 CIGS/DAY): ICD-10-CM

## 2022-07-21 DIAGNOSIS — F17.200 NICOTINE DEPENDENCE: Primary | ICD-10-CM

## 2022-07-21 PROCEDURE — 99999 PR PBB SHADOW E&M-EST. PATIENT-LVL II: ICD-10-PCS | Mod: PBBFAC,,,

## 2022-07-21 PROCEDURE — 99999 PR PBB SHADOW E&M-EST. PATIENT-LVL II: CPT | Mod: PBBFAC,,,

## 2022-07-21 PROCEDURE — 99402 PR PREVENT COUNSEL,INDIV,30 MIN: ICD-10-PCS | Mod: S$GLB,,,

## 2022-07-21 PROCEDURE — 99402 PREV MED CNSL INDIV APPRX 30: CPT | Mod: S$GLB,,,

## 2022-07-21 RX ORDER — IBUPROFEN 200 MG
1 TABLET ORAL DAILY
Qty: 28 PATCH | Refills: 0 | Status: SHIPPED | OUTPATIENT
Start: 2022-07-21 | End: 2024-02-08

## 2022-07-21 NOTE — Clinical Note
Patient seen in clinic today, he states smoking 2 cigs/day.  The patient remains on the prescribed tobacco cessation medication regimen of 14 mg patch and 2 mg nicotine gum PRN(in place of a cigarette)without any negative side effects at this time. Patch refill sent to pharmacy. The patient denies any abnormal behavioral or mental changes at this time. Discussed and reviewed strategies, controlling environment, cues, triggers, new goals set. Introduced high risk situations with preparation interventions, caffeine similarities with withdrawal issues of habit and nicotine, Alcohol, Understanding urges, cravings, stress and relaxation. Encouraged patient to try a dry run for next visit. The patient will continue with  therapy sessions and medication monitoring by CTTS. Prescribed medication management will be by physician.

## 2022-07-21 NOTE — PROGRESS NOTES
Individual Follow-Up Form    7/21/2022    Quit Date:     Clinical Status of Patient: Outpatient      Continuing Medication: yes  Patches    Other Medications: 2 mg nicotine gum PRN     Target Symptoms: Withdrawal and medication side effects. The following were  rated moderate (3) to severe (4) on TCRS:  · Moderate (3): none  · Severe (4): none    Comments: Patient seen in clinic today, he states smoking 2 cigs/day.  The patient remains on the prescribed tobacco cessation medication regimen of 14 mg patch and 2 mg nicotine gum PRN(in place of a cigarette)without any negative side effects at this time. Patch refill sent to pharmacy. The patient denies any abnormal behavioral or mental changes at this time. Discussed and reviewed strategies, controlling environment, cues, triggers, new goals set. Introduced high risk situations with preparation interventions, caffeine similarities with withdrawal issues of habit and nicotine, Alcohol, Understanding urges, cravings, stress and relaxation. Encouraged patient to try a dry run for next visit. The patient will continue with  therapy sessions and medication monitoring by CTTS. Prescribed medication management will be by physician.        Diagnosis: F17.200    Next Visit: 2 weeks

## 2022-08-04 ENCOUNTER — CLINICAL SUPPORT (OUTPATIENT)
Dept: SMOKING CESSATION | Facility: CLINIC | Age: 52
End: 2022-08-04
Payer: COMMERCIAL

## 2022-08-04 DIAGNOSIS — F17.200 NICOTINE DEPENDENCE: Primary | ICD-10-CM

## 2022-08-04 PROCEDURE — 99402 PR PREVENT COUNSEL,INDIV,30 MIN: ICD-10-PCS | Mod: S$GLB,,,

## 2022-08-04 PROCEDURE — 99999 PR PBB SHADOW E&M-EST. PATIENT-LVL I: ICD-10-PCS | Mod: PBBFAC,,,

## 2022-08-04 PROCEDURE — 99402 PREV MED CNSL INDIV APPRX 30: CPT | Mod: S$GLB,,,

## 2022-08-04 PROCEDURE — 99999 PR PBB SHADOW E&M-EST. PATIENT-LVL I: CPT | Mod: PBBFAC,,,

## 2022-08-04 NOTE — Clinical Note
Patient seen in clinic today, he states smoking about the same 1-2 cpd. The patient remains on the prescribed tobacco cessation medication regimen of 14 mg patch and 2 mg gum PRN(in place of a cigarette) without any negative side effects at this time. No refills needed at this time. The patient denies any abnormal behavioral or mental changes at this time. Patient states he has set a date to try a dry run of 8/31. Discussed and reviewed strategies, cues, and triggers. The patient will continue with  therapy sessions and medication monitoring by CTTS. Prescribed medication management will be by physician.

## 2022-09-21 ENCOUNTER — IMMUNIZATION (OUTPATIENT)
Dept: PHARMACY | Facility: CLINIC | Age: 52
End: 2022-09-21
Payer: COMMERCIAL

## 2022-09-27 ENCOUNTER — IMMUNIZATION (OUTPATIENT)
Dept: INTERNAL MEDICINE | Facility: CLINIC | Age: 52
End: 2022-09-27
Payer: COMMERCIAL

## 2022-09-27 DIAGNOSIS — Z23 NEED FOR VACCINATION: Primary | ICD-10-CM

## 2022-09-27 PROCEDURE — 91312 COVID-19, MRNA, LNP-S, BIVALENT BOOSTER, PF, 30 MCG/0.3 ML DOSE: CPT | Mod: S$GLB,,, | Performed by: INTERNAL MEDICINE

## 2022-09-27 PROCEDURE — 0124A COVID-19, MRNA, LNP-S, BIVALENT BOOSTER, PF, 30 MCG/0.3 ML DOSE: CPT | Mod: CV19,PBBFAC | Performed by: INTERNAL MEDICINE

## 2022-09-27 PROCEDURE — 91312 COVID-19, MRNA, LNP-S, BIVALENT BOOSTER, PF, 30 MCG/0.3 ML DOSE: ICD-10-PCS | Mod: S$GLB,,, | Performed by: INTERNAL MEDICINE

## 2022-10-02 ENCOUNTER — PATIENT MESSAGE (OUTPATIENT)
Dept: INTERNAL MEDICINE | Facility: CLINIC | Age: 52
End: 2022-10-02
Payer: COMMERCIAL

## 2022-10-02 DIAGNOSIS — Z82.49 FAMILY HISTORY OF EARLY CAD: Primary | ICD-10-CM

## 2022-10-04 ENCOUNTER — PATIENT MESSAGE (OUTPATIENT)
Dept: INTERNAL MEDICINE | Facility: CLINIC | Age: 52
End: 2022-10-04
Payer: COMMERCIAL

## 2022-10-10 ENCOUNTER — OFFICE VISIT (OUTPATIENT)
Dept: CARDIOLOGY | Facility: CLINIC | Age: 52
End: 2022-10-10
Attending: FAMILY MEDICINE
Payer: COMMERCIAL

## 2022-10-10 VITALS
WEIGHT: 204.31 LBS | BODY MASS INDEX: 29.25 KG/M2 | SYSTOLIC BLOOD PRESSURE: 116 MMHG | HEIGHT: 70 IN | DIASTOLIC BLOOD PRESSURE: 78 MMHG | HEART RATE: 87 BPM | OXYGEN SATURATION: 97 %

## 2022-10-10 DIAGNOSIS — E78.00 HYPERCHOLESTEROLEMIA: Primary | ICD-10-CM

## 2022-10-10 DIAGNOSIS — E11.65 TYPE 2 DIABETES MELLITUS WITH HYPERGLYCEMIA, WITHOUT LONG-TERM CURRENT USE OF INSULIN: ICD-10-CM

## 2022-10-10 DIAGNOSIS — Z82.49 FAMILY HISTORY OF EARLY CAD: ICD-10-CM

## 2022-10-10 PROCEDURE — 3078F PR MOST RECENT DIASTOLIC BLOOD PRESSURE < 80 MM HG: ICD-10-PCS | Mod: CPTII,S$GLB,, | Performed by: INTERNAL MEDICINE

## 2022-10-10 PROCEDURE — 3008F PR BODY MASS INDEX (BMI) DOCUMENTED: ICD-10-PCS | Mod: CPTII,S$GLB,, | Performed by: INTERNAL MEDICINE

## 2022-10-10 PROCEDURE — 3066F PR DOCUMENTATION OF TREATMENT FOR NEPHROPATHY: ICD-10-PCS | Mod: CPTII,S$GLB,, | Performed by: INTERNAL MEDICINE

## 2022-10-10 PROCEDURE — 3074F SYST BP LT 130 MM HG: CPT | Mod: CPTII,S$GLB,, | Performed by: INTERNAL MEDICINE

## 2022-10-10 PROCEDURE — 3066F NEPHROPATHY DOC TX: CPT | Mod: CPTII,S$GLB,, | Performed by: INTERNAL MEDICINE

## 2022-10-10 PROCEDURE — 1159F MED LIST DOCD IN RCRD: CPT | Mod: CPTII,S$GLB,, | Performed by: INTERNAL MEDICINE

## 2022-10-10 PROCEDURE — 99204 PR OFFICE/OUTPT VISIT, NEW, LEVL IV, 45-59 MIN: ICD-10-PCS | Mod: 25,S$GLB,, | Performed by: INTERNAL MEDICINE

## 2022-10-10 PROCEDURE — 93010 ELECTROCARDIOGRAM REPORT: CPT | Mod: S$GLB,,, | Performed by: INTERNAL MEDICINE

## 2022-10-10 PROCEDURE — 1159F PR MEDICATION LIST DOCUMENTED IN MEDICAL RECORD: ICD-10-PCS | Mod: CPTII,S$GLB,, | Performed by: INTERNAL MEDICINE

## 2022-10-10 PROCEDURE — 3078F DIAST BP <80 MM HG: CPT | Mod: CPTII,S$GLB,, | Performed by: INTERNAL MEDICINE

## 2022-10-10 PROCEDURE — 3074F PR MOST RECENT SYSTOLIC BLOOD PRESSURE < 130 MM HG: ICD-10-PCS | Mod: CPTII,S$GLB,, | Performed by: INTERNAL MEDICINE

## 2022-10-10 PROCEDURE — 93005 ELECTROCARDIOGRAM TRACING: CPT

## 2022-10-10 PROCEDURE — 3044F PR MOST RECENT HEMOGLOBIN A1C LEVEL <7.0%: ICD-10-PCS | Mod: CPTII,S$GLB,, | Performed by: INTERNAL MEDICINE

## 2022-10-10 PROCEDURE — 3061F NEG MICROALBUMINURIA REV: CPT | Mod: CPTII,S$GLB,, | Performed by: INTERNAL MEDICINE

## 2022-10-10 PROCEDURE — 3008F BODY MASS INDEX DOCD: CPT | Mod: CPTII,S$GLB,, | Performed by: INTERNAL MEDICINE

## 2022-10-10 PROCEDURE — 3044F HG A1C LEVEL LT 7.0%: CPT | Mod: CPTII,S$GLB,, | Performed by: INTERNAL MEDICINE

## 2022-10-10 PROCEDURE — 1160F PR REVIEW ALL MEDS BY PRESCRIBER/CLIN PHARMACIST DOCUMENTED: ICD-10-PCS | Mod: CPTII,S$GLB,, | Performed by: INTERNAL MEDICINE

## 2022-10-10 PROCEDURE — 3061F PR NEG MICROALBUMINURIA RESULT DOCUMENTED/REVIEW: ICD-10-PCS | Mod: CPTII,S$GLB,, | Performed by: INTERNAL MEDICINE

## 2022-10-10 PROCEDURE — 1160F RVW MEDS BY RX/DR IN RCRD: CPT | Mod: CPTII,S$GLB,, | Performed by: INTERNAL MEDICINE

## 2022-10-10 PROCEDURE — 93010 EKG 12-LEAD: ICD-10-PCS | Mod: S$GLB,,, | Performed by: INTERNAL MEDICINE

## 2022-10-10 PROCEDURE — 99204 OFFICE O/P NEW MOD 45 MIN: CPT | Mod: 25,S$GLB,, | Performed by: INTERNAL MEDICINE

## 2022-10-10 PROCEDURE — 99999 PR PBB SHADOW E&M-EST. PATIENT-LVL IV: CPT | Mod: PBBFAC,,, | Performed by: INTERNAL MEDICINE

## 2022-10-10 PROCEDURE — 99999 PR PBB SHADOW E&M-EST. PATIENT-LVL IV: ICD-10-PCS | Mod: PBBFAC,,, | Performed by: INTERNAL MEDICINE

## 2022-10-10 NOTE — PROGRESS NOTES
[facility]    Chief Complaint  Chief Complaint   Patient presents with    Risk Factor Management For Atherosclerosis       HPI:    Patient presents because of a strong family history of early coronary artery disease.  He denies prior cardiac problems or workup.  Active and exercises without exertional dyspnea or chest discomfort.  No recent changes in the dosing of his cholesterol or diabetes medications.  Trying to stop smoking.    Medications  Current Outpatient Medications   Medication Sig Dispense Refill    CONTOUR NEXT TEST STRIPS Strp USE THREE TIMES DAILY 300 strip 0    metFORMIN (GLUCOPHAGE) 500 MG tablet Take 1 tablet (500 mg total) by mouth 2 (two) times daily with meals. 180 tablet 0    nicotine (NICODERM CQ) 14 mg/24 hr Place 1 patch onto the skin once daily. 28 patch 0    nicotine, polacrilex, (NICORETTE) 2 mg Gum Take 1 each (2 mg total) by mouth as needed (Use in place of cigarettes). 200 each 0    rosuvastatin (CRESTOR) 5 MG tablet Take 1 tablet (5 mg total) by mouth once daily. 90 tablet 3     No current facility-administered medications for this visit.        History  Past Medical History:   Diagnosis Date    Diabetes mellitus, type 2 07/2016    Lyme disease 2016     Past Surgical History:   Procedure Laterality Date    EPIDURAL STEROID INJECTION N/A 8/30/2019    Procedure: INJECTION, STEROID, EPIDURAL, CERVICAL C7-T1 INTERLAMINAR need consent;  Surgeon: Uzma Ulloa MD;  Location: [facility];  Service: Pain Management;  Laterality: N/A;    EPIDURAL STEROID INJECTION N/A 1/10/2022    Procedure: Injection, Steroid, Epidural C7/T1;  Surgeon: Otis Galindo MD;  Location: [facility] PAIN [facility];  Service: Pain Management;  Laterality: N/A;    KNEE ARTHROSCOPY Right 1989     Social History     Socioeconomic History    Marital status: Single   Tobacco Use    Smoking status: Every Day     Types: Cigarettes     Passive exposure: Never    Smokeless tobacco: Never   Substance and Sexual Activity     Alcohol use: Yes     Social Determinants of Health     Financial Resource Strain: Low Risk     Difficulty of Paying Living Expenses: Not very hard   Food Insecurity: No Food Insecurity    Worried About Running Out of Food in the Last Year: Never true    Ran Out of Food in the Last Year: Never true   Transportation Needs: No Transportation Needs    Lack of Transportation (Medical): No    Lack of Transportation (Non-Medical): No   Physical Activity: Sufficiently Active    Days of Exercise per Week: 6 days    Minutes of Exercise per Session: 40 min   Stress: Stress Concern Present    Feeling of Stress : To some extent   Social Connections: Unknown    Frequency of Communication with Friends and Family: More than three times a week    Frequency of Social Gatherings with Friends and Family: Once a week    Active Member of Clubs or Organizations: Yes    Attends Club or Organization Meetings: More than 4 times per year    Marital Status: Never    Housing Stability: Low Risk     Unable to Pay for Housing in the Last Year: No    Number of Places Lived in the Last Year: 1    Unstable Housing in the Last Year: No     Family History   Problem Relation Age of Onset    Diabetes Mother     Coronary artery disease Mother     Diabetes Father     Coronary artery disease Father     Diabetes Sister     Coronary artery disease Brother         Allergies  Review of patient's allergies indicates:  No Known Allergies    Review of Systems   Review of Systems   Constitutional: Negative for malaise/fatigue, weight gain and weight loss.   Eyes:  Negative for visual disturbance.   Cardiovascular:  Negative for chest pain, claudication, cyanosis, dyspnea on exertion, irregular heartbeat, leg swelling, near-syncope, orthopnea, palpitations, paroxysmal nocturnal dyspnea and syncope.   Respiratory:  Negative for cough, hemoptysis, shortness of breath, sleep disturbances due to breathing and wheezing.    Hematologic/Lymphatic: Negative for  bleeding problem. Does not bruise/bleed easily.   Skin:  Negative for poor wound healing.   Musculoskeletal:  Negative for muscle cramps and myalgias.   Gastrointestinal:  Negative for abdominal pain, anorexia, diarrhea, heartburn, hematemesis, hematochezia, melena, nausea and vomiting.   Genitourinary:  Negative for hematuria and nocturia.   Neurological:  Negative for excessive daytime sleepiness, dizziness, focal weakness, light-headedness and weakness.     Physical Exam  Vitals:    10/10/22 0847   BP: 116/78   Pulse: 87     Wt Readings from Last 1 Encounters:   10/10/22 92.7 kg (204 lb 4.8 oz)     Physical Exam  Vitals and nursing note reviewed.   Constitutional:       General: He is not in acute distress.     Appearance: He is not toxic-appearing or diaphoretic.   HENT:      Head: Normocephalic and atraumatic.      Mouth/Throat:      Lips: Pink.      Mouth: Mucous membranes are moist.   Eyes:      General: No scleral icterus.     Conjunctiva/sclera: Conjunctivae normal.   Neck:      Thyroid: No thyromegaly.      Vascular: No carotid bruit, hepatojugular reflux or JVD.      Trachea: Trachea normal.   Cardiovascular:      Rate and Rhythm: Normal rate and regular rhythm. No extrasystoles are present.     Chest Wall: PMI is not displaced.      Pulses:           Carotid pulses are 2+ on the right side and 2+ on the left side.       Radial pulses are 2+ on the right side and 2+ on the left side.        Dorsalis pedis pulses are 2+ on the right side and 2+ on the left side.        Posterior tibial pulses are 2+ on the right side and 2+ on the left side.      Heart sounds: S1 normal and S2 normal. No murmur heard.    No friction rub. No S3 or S4 sounds.   Pulmonary:      Effort: Pulmonary effort is normal. No tachypnea, bradypnea, accessory muscle usage or respiratory distress.      Breath sounds: Normal breath sounds and air entry. No decreased breath sounds, wheezing, rhonchi or rales.   Abdominal:      General:  Bowel sounds are normal. There is no distension or abdominal bruit.      Palpations: Abdomen is soft. There is no hepatomegaly, splenomegaly or pulsatile mass.      Tenderness: There is no abdominal tenderness.   Musculoskeletal:         General: No tenderness or deformity.      Right lower leg: No edema.      Left lower leg: No edema.   Skin:     General: Skin is warm and dry.      Capillary Refill: Capillary refill takes less than 2 seconds.      Coloration: Skin is not cyanotic or pale.      Nails: There is no clubbing.   Neurological:      General: No focal deficit present.      Mental Status: He is alert and oriented to person, place, and time.   Psychiatric:         Attention and Perception: Attention normal.         Mood and Affect: Mood normal.         Speech: Speech normal.         Behavior: Behavior normal. Behavior is cooperative.       Labs  Lab Visit on 05/06/2022   Component Date Value Ref Range Status    Sodium 05/06/2022 139  136 - 145 mmol/L Final    Potassium 05/06/2022 4.3  3.5 - 5.1 mmol/L Final    Chloride 05/06/2022 101  95 - 110 mmol/L Final    CO2 05/06/2022 27  23 - 29 mmol/L Final    Glucose 05/06/2022 152 (H)  70 - 110 mg/dL Final    BUN 05/06/2022 14  6 - 20 mg/dL Final    Creatinine 05/06/2022 1.0  0.5 - 1.4 mg/dL Final    Calcium 05/06/2022 10.0  8.7 - 10.5 mg/dL Final    Total Protein 05/06/2022 7.5  6.0 - 8.4 g/dL Final    Albumin 05/06/2022 4.5  3.5 - 5.2 g/dL Final    Total Bilirubin 05/06/2022 0.9  0.1 - 1.0 mg/dL Final    Comment: For infants and newborns, interpretation of results should be based  on gestational age, weight and in agreement with clinical  observations.    Premature Infant recommended reference ranges:  Up to 24 hours.............<8.0 mg/dL  Up to 48 hours............<12.0 mg/dL  3-5 days..................<15.0 mg/dL  6-29 days.................<15.0 mg/dL      Alkaline Phosphatase 05/06/2022 78  55 - 135 U/L Final    AST 05/06/2022 23  10 - 40 U/L Final    ALT  05/06/2022 31  10 - 44 U/L Final    Anion Gap 05/06/2022 11  8 - 16 mmol/L Final    eGFR if African American 05/06/2022 >60  >60 mL/min/1.73 m^2 Final    eGFR if non African American 05/06/2022 >60  >60 mL/min/1.73 m^2 Final    Comment: Calculation used to obtain the estimated glomerular filtration  rate (eGFR) is the CKD-EPI equation.       Cholesterol 05/06/2022 203 (H)  120 - 199 mg/dL Final    Comment: The National Cholesterol Education Program (NCEP) has set the  following guidelines (reference ranges) for Cholesterol:  Optimal.....................<200 mg/dL  Borderline High.............200-239 mg/dL  High........................> or = 240 mg/dL      Triglycerides 05/06/2022 164 (H)  30 - 150 mg/dL Final    Comment: The National Cholesterol Education Program (NCEP) has set the  following guidelines (reference values) for triglycerides:  Normal......................<150 mg/dL  Borderline High.............150-199 mg/dL  High........................200-499 mg/dL      HDL 05/06/2022 50  40 - 75 mg/dL Final    Comment: The National Cholesterol Education Program (NCEP) has set the  following guidelines (reference values) for HDL Cholesterol:  Low...............<40 mg/dL  Optimal...........>60 mg/dL      LDL Cholesterol 05/06/2022 120.2  63.0 - 159.0 mg/dL Final    Comment: The National Cholesterol Education Program (NCEP) has set the  following guidelines (reference values) for LDL Cholesterol:  Optimal.......................<130 mg/dL  Borderline High...............130-159 mg/dL  High..........................160-189 mg/dL  Very High.....................>190 mg/dL      HDL/Cholesterol Ratio 05/06/2022 24.6  20.0 - 50.0 % Final    Total Cholesterol/HDL Ratio 05/06/2022 4.1  2.0 - 5.0 Final    Non-HDL Cholesterol 05/06/2022 153  mg/dL Final    Comment: Risk category and Non-HDL cholesterol goals:  Coronary heart disease (CHD)or equivalent (10-year risk of CHD >20%):  Non-HDL cholesterol goal     <130 mg/dL  Two or  more CHD risk factors and 10-year risk of CHD <= 20%:  Non-HDL cholesterol goal     <160 mg/dL  0 to 1 CHD risk factor:  Non-HDL cholesterol goal     <190 mg/dL      TSH 05/06/2022 2.360  0.400 - 4.000 uIU/mL Final    Hemoglobin A1C 05/06/2022 6.9 (H)  4.0 - 5.6 % Final    Comment: ADA Screening Guidelines:  5.7-6.4%  Consistent with prediabetes  >or=6.5%  Consistent with diabetes    High levels of fetal hemoglobin interfere with the HbA1C  assay. Heterozygous hemoglobin variants (HbS, HgC, etc)do  not significantly interfere with this assay.   However, presence of multiple variants may affect accuracy.      Estimated Avg Glucose 05/06/2022 151 (H)  68 - 131 mg/dL Final       Imaging  No results found.    Assessment  1. Family history of early CAD  - Ambulatory referral/consult to Cardiology  - IN OFFICE EKG 12-LEAD (to [facility])  - Lipid Panel; Future  - Exercise Stress - EKG; Future    2. Hypercholesterolemia  Needs better control with a goal LDL less than 100    3. Type 2 diabetes mellitus with hyperglycemia, without long-term current use of insulin  Needs better control      Plan and Discussion    Repeat lipids.  If LDL remains above goal, increase rosuvastatin.  He will work on diabetes control and smoking cessation.  Further planning based on results of stress test.    The 10-year ASCVD risk score (Nita DK, et al., 2019) is: 12.4%    Values used to calculate the score:      Age: 52 years      Sex: Male      Is Non- : No      Diabetic: Yes      Tobacco smoker: Yes      Systolic Blood Pressure: 116 mmHg      Is BP treated: No      HDL Cholesterol: 50 mg/dL      Total Cholesterol: 203 mg/dL     Follow Up  Follow up for after testing.      Ronald Rich MD

## 2022-10-12 ENCOUNTER — LAB VISIT (OUTPATIENT)
Dept: LAB | Facility: OTHER | Age: 52
End: 2022-10-12
Attending: INTERNAL MEDICINE
Payer: COMMERCIAL

## 2022-10-12 DIAGNOSIS — E78.00 HYPERCHOLESTEROLEMIA: Primary | ICD-10-CM

## 2022-10-12 DIAGNOSIS — Z82.49 FAMILY HISTORY OF EARLY CAD: ICD-10-CM

## 2022-10-12 LAB
CHOLEST SERPL-MCNC: 192 MG/DL (ref 120–199)
CHOLEST/HDLC SERPL: 4.6 {RATIO} (ref 2–5)
HDLC SERPL-MCNC: 42 MG/DL (ref 40–75)
HDLC SERPL: 21.9 % (ref 20–50)
LDLC SERPL CALC-MCNC: 121 MG/DL (ref 63–159)
NONHDLC SERPL-MCNC: 150 MG/DL
TRIGL SERPL-MCNC: 145 MG/DL (ref 30–150)

## 2022-10-12 PROCEDURE — 36415 COLL VENOUS BLD VENIPUNCTURE: CPT | Performed by: INTERNAL MEDICINE

## 2022-10-12 PROCEDURE — 80061 LIPID PANEL: CPT | Performed by: INTERNAL MEDICINE

## 2022-11-03 ENCOUNTER — HOSPITAL ENCOUNTER (OUTPATIENT)
Dept: CARDIOLOGY | Facility: OTHER | Age: 52
Discharge: HOME | End: 2022-11-03
Attending: INTERNAL MEDICINE
Payer: COMMERCIAL

## 2022-11-03 VITALS
HEART RATE: 75 BPM | DIASTOLIC BLOOD PRESSURE: 81 MMHG | BODY MASS INDEX: 29.2 KG/M2 | WEIGHT: 204 LBS | SYSTOLIC BLOOD PRESSURE: 131 MMHG | HEIGHT: 70 IN

## 2022-11-03 DIAGNOSIS — Z82.49 FAMILY HISTORY OF EARLY CAD: ICD-10-CM

## 2022-11-03 LAB
CV STRESS BASE HR: 75 BPM
DIASTOLIC BLOOD PRESSURE: 81 MMHG
OHS CV CPX 85 PERCENT MAX PREDICTED HEART RATE MALE: 145
OHS CV CPX ESTIMATED METS: 13
OHS CV CPX MAX PREDICTED HEART RATE: 170
OHS CV CPX PATIENT IS FEMALE: 0
OHS CV CPX PATIENT IS MALE: 1
OHS CV CPX PEAK DIASTOLIC BLOOD PRESSURE: 85 MMHG
OHS CV CPX PEAK HEAR RATE: 153 BPM
OHS CV CPX PEAK RATE PRESSURE PRODUCT: NORMAL
OHS CV CPX PEAK SYSTOLIC BLOOD PRESSURE: 169 MMHG
OHS CV CPX PERCENT MAX PREDICTED HEART RATE ACHIEVED: 90
OHS CV CPX RATE PRESSURE PRODUCT PRESENTING: 9825
STRESS ECHO POST EXERCISE DUR MIN: 12 MINUTES
STRESS ECHO POST EXERCISE DUR SEC: 0 SECONDS
SYSTOLIC BLOOD PRESSURE: 131 MMHG

## 2022-11-03 PROCEDURE — 93016 CV STRESS TEST SUPVJ ONLY: CPT | Mod: ,,, | Performed by: INTERNAL MEDICINE

## 2022-11-03 PROCEDURE — 93017 CV STRESS TEST TRACING ONLY: CPT

## 2022-11-03 PROCEDURE — 93018 CV STRESS TEST I&R ONLY: CPT | Mod: ,,, | Performed by: INTERNAL MEDICINE

## 2022-11-03 PROCEDURE — 93018 EXERCISE STRESS - EKG (CUPID ONLY): ICD-10-PCS | Mod: ,,, | Performed by: INTERNAL MEDICINE

## 2022-11-03 PROCEDURE — 93016 EXERCISE STRESS - EKG (CUPID ONLY): ICD-10-PCS | Mod: ,,, | Performed by: INTERNAL MEDICINE

## 2022-11-18 DIAGNOSIS — E11.9 TYPE 2 DIABETES MELLITUS WITHOUT COMPLICATION: ICD-10-CM

## 2022-12-01 ENCOUNTER — LAB VISIT (OUTPATIENT)
Dept: LAB | Facility: OTHER | Age: 52
End: 2022-12-01
Attending: FAMILY MEDICINE
Payer: COMMERCIAL

## 2022-12-01 DIAGNOSIS — E11.9 TYPE 2 DIABETES MELLITUS WITHOUT COMPLICATION: ICD-10-CM

## 2022-12-01 PROCEDURE — 36415 COLL VENOUS BLD VENIPUNCTURE: CPT | Performed by: FAMILY MEDICINE

## 2022-12-01 PROCEDURE — 83036 HEMOGLOBIN GLYCOSYLATED A1C: CPT | Performed by: FAMILY MEDICINE

## 2022-12-02 LAB
ESTIMATED AVG GLUCOSE: 169 MG/DL (ref 68–131)
HBA1C MFR BLD: 7.5 % (ref 4–5.6)

## 2022-12-07 DIAGNOSIS — Z00.00 PREVENTATIVE HEALTH CARE: ICD-10-CM

## 2022-12-07 DIAGNOSIS — E78.01 HYPERLIPIDEMIA TYPE II: ICD-10-CM

## 2022-12-07 DIAGNOSIS — E11.9 TYPE 2 DIABETES MELLITUS WITHOUT COMPLICATION, WITHOUT LONG-TERM CURRENT USE OF INSULIN: ICD-10-CM

## 2022-12-07 RX ORDER — ROSUVASTATIN CALCIUM 5 MG/1
5 TABLET, COATED ORAL DAILY
Qty: 90 TABLET | Refills: 0 | Status: SHIPPED | OUTPATIENT
Start: 2022-12-07 | End: 2023-01-02

## 2022-12-07 NOTE — TELEPHONE ENCOUNTER
Care Due:                  Date            Visit Type   Department     Provider  --------------------------------------------------------------------------------                                MYCHART                              ANNUAL                              CHECKUP/PHY  [facility] INTERNAL  Kam Terry  Last Visit: 02-      [facility]  Next Visit: None Scheduled  None         None Found                                                            Last  Test          Frequency    Reason                     Performed    Due Date  --------------------------------------------------------------------------------    Office Visit  12 months..  metFORMIN, rosuvastatin..  02- 02-    [facility] Embedded Care Gaps. Reference number: [id number]. 12/07/2022   7:47:12 AM CST

## 2022-12-08 ENCOUNTER — PATIENT OUTREACH (OUTPATIENT)
Dept: ADMINISTRATIVE | Facility: HOSPITAL | Age: 52
End: 2022-12-08
Payer: COMMERCIAL

## 2022-12-08 NOTE — TELEPHONE ENCOUNTER
Refill Decision Note   Osmel Nair  is requesting a refill authorization.  Brief Assessment and Rationale for Refill:  Approve    -Medication-Related Problems Identified: Requires appointment  Medication Therapy Plan:       Medication Reconciliation Completed: No   Comments:     Provider Staff:     Action is required for this patient.   Please see care gap opportunities below in Care Due Message.     Thanks!  [facility]     Appointments      Date Provider   Last Visit   2/23/2022 Kam Phipps MD   Next Visit   Visit date not found Kam Phipps MD     Note composed:7:13 PM 12/07/2022           Note composed:7:13 PM 12/07/2022

## 2022-12-30 ENCOUNTER — PATIENT MESSAGE (OUTPATIENT)
Dept: INTERNAL MEDICINE | Facility: CLINIC | Age: 52
End: 2022-12-30
Payer: COMMERCIAL

## 2023-01-01 ENCOUNTER — NURSE TRIAGE (OUTPATIENT)
Dept: ADMINISTRATIVE | Facility: CLINIC | Age: 53
End: 2023-01-01
Payer: COMMERCIAL

## 2023-01-01 NOTE — TELEPHONE ENCOUNTER
Pt states returning call from Triage, says attempted to answer call from triage nurse and accidentally hung up. Pt lost connection during this call also. Unable to triage.     Reason for Disposition   Unable to complete triage due to phone connection issues    Protocols used: No Contact or Duplicate Contact Call-A-AH

## 2023-01-01 NOTE — TELEPHONE ENCOUNTER
Pt is trying to get a RX for Paxlovid d/t + Covid tests. Pt declines answering triage questions. Pt is advised that a high priorty message will be routed to his PCP for further assistance and is instructed to call back with any new/worsening sxs, questions, or concerns. He verbalizes understanding.   Reason for Disposition   Prescription request for new medicine (not a refill)    Protocols used: Medication Question Call-A-AH

## 2023-01-02 ENCOUNTER — OFFICE VISIT (OUTPATIENT)
Dept: URGENT CARE | Facility: CLINIC | Age: 53
End: 2023-01-02
Payer: COMMERCIAL

## 2023-01-02 VITALS
HEART RATE: 95 BPM | BODY MASS INDEX: 27.92 KG/M2 | OXYGEN SATURATION: 98 % | HEIGHT: 70 IN | TEMPERATURE: 99 F | RESPIRATION RATE: 18 BRPM | DIASTOLIC BLOOD PRESSURE: 85 MMHG | SYSTOLIC BLOOD PRESSURE: 120 MMHG | WEIGHT: 195 LBS

## 2023-01-02 DIAGNOSIS — U07.1 COVID-19 VIRUS DETECTED: ICD-10-CM

## 2023-01-02 DIAGNOSIS — R05.9 COUGH, UNSPECIFIED TYPE: ICD-10-CM

## 2023-01-02 DIAGNOSIS — U07.1 COVID-19: Primary | ICD-10-CM

## 2023-01-02 LAB
CTP QC/QA: YES
SARS-COV-2 AG RESP QL IA.RAPID: POSITIVE

## 2023-01-02 PROCEDURE — 3072F LOW RISK FOR RETINOPATHY: CPT | Mod: CPTII,S$GLB,,

## 2023-01-02 PROCEDURE — 87811 SARS CORONAVIRUS 2 ANTIGEN POCT, MANUAL READ: ICD-10-PCS | Mod: QW,S$GLB,,

## 2023-01-02 PROCEDURE — 3008F BODY MASS INDEX DOCD: CPT | Mod: CPTII,S$GLB,,

## 2023-01-02 PROCEDURE — 1160F PR REVIEW ALL MEDS BY PRESCRIBER/CLIN PHARMACIST DOCUMENTED: ICD-10-PCS | Mod: CPTII,S$GLB,,

## 2023-01-02 PROCEDURE — 87811 SARS-COV-2 COVID19 W/OPTIC: CPT | Mod: QW,S$GLB,,

## 2023-01-02 PROCEDURE — 3008F PR BODY MASS INDEX (BMI) DOCUMENTED: ICD-10-PCS | Mod: CPTII,S$GLB,,

## 2023-01-02 PROCEDURE — 3074F SYST BP LT 130 MM HG: CPT | Mod: CPTII,S$GLB,,

## 2023-01-02 PROCEDURE — 1160F RVW MEDS BY RX/DR IN RCRD: CPT | Mod: CPTII,S$GLB,,

## 2023-01-02 PROCEDURE — 1159F MED LIST DOCD IN RCRD: CPT | Mod: CPTII,S$GLB,,

## 2023-01-02 PROCEDURE — 99203 PR OFFICE/OUTPT VISIT, NEW, LEVL III, 30-44 MIN: ICD-10-PCS | Mod: S$GLB,,,

## 2023-01-02 PROCEDURE — 1159F PR MEDICATION LIST DOCUMENTED IN MEDICAL RECORD: ICD-10-PCS | Mod: CPTII,S$GLB,,

## 2023-01-02 PROCEDURE — 3079F DIAST BP 80-89 MM HG: CPT | Mod: CPTII,S$GLB,,

## 2023-01-02 PROCEDURE — 3072F PR LOW RISK FOR RETINOPATHY: ICD-10-PCS | Mod: CPTII,S$GLB,,

## 2023-01-02 PROCEDURE — 3079F PR MOST RECENT DIASTOLIC BLOOD PRESSURE 80-89 MM HG: ICD-10-PCS | Mod: CPTII,S$GLB,,

## 2023-01-02 PROCEDURE — 3074F PR MOST RECENT SYSTOLIC BLOOD PRESSURE < 130 MM HG: ICD-10-PCS | Mod: CPTII,S$GLB,,

## 2023-01-02 PROCEDURE — 99203 OFFICE O/P NEW LOW 30 MIN: CPT | Mod: S$GLB,,,

## 2023-01-02 RX ORDER — NIRMATRELVIR AND RITONAVIR 300-100 MG
KIT ORAL
Qty: 30 TABLET | Refills: 0 | Status: SHIPPED | OUTPATIENT
Start: 2023-01-02 | End: 2023-12-18 | Stop reason: ALTCHOICE

## 2023-01-02 RX ORDER — BENZONATATE 200 MG/1
200 CAPSULE ORAL 3 TIMES DAILY PRN
Qty: 30 CAPSULE | Refills: 0 | Status: SHIPPED | OUTPATIENT
Start: 2023-01-02 | End: 2023-01-12

## 2023-01-02 RX ORDER — PROMETHAZINE HYDROCHLORIDE AND DEXTROMETHORPHAN HYDROBROMIDE 6.25; 15 MG/5ML; MG/5ML
5 SYRUP ORAL EVERY 4 HOURS PRN
Qty: 180 ML | Refills: 0 | Status: SHIPPED | OUTPATIENT
Start: 2023-01-02 | End: 2023-01-09

## 2023-01-02 NOTE — PATIENT INSTRUCTIONS
Directions for taking Paxlovid:   Cholesterol Medications: if you are taking a statin for cholesterol you should start paxlovid 12 hours after your last dose of your statin medication. DO NOT take your statin medication while taking Paxlovid. Restart your statin 5 days after you take your last dose of the Paxlovid. If you are confused or have any question, please call the clinic or your primary care for clarification.      You have tested positive for COVID-19 today.       ISOLATION  If you tested positive and do not have symptoms, you must isolate for 5 days starting on the day of the positive test. I     If you tested positive and have symptoms, you must isolate for 5 days starting on the day of the first symptoms,  not the day of the positive test.     This is the most important part, both the CDC and the [facility] emphasize that you do not test out of isolation.     After 5 days, if your symptoms have improved and you have not had fever on day 5, you can return to the community on day 6- NO TESTING REQUIRED!      In fact, we do not retest if you were positive in the last 90 days.     After your 5 days of isolation are completed, the CDC recommends strict mask use for the first 5 days that you come out of isolation.    - Rest.    - Drink plenty of fluids.     - You can take over-the-counter claritin, zyrtec, allegra, or xyzal as directed. These are antihistamines that can help with runny nose, nasal congestion, sneezing, and helps to dry up post-nasal drip, which usually causes sore throat and cough.    - You can take plain Mucinex (guaifenesin) 1200 mg twice a day to help loosen mucous.      - You can use Flonase (fluticasone) nasal spray as directed for sinus congestion and postnasal drip. This is a steroid nasal spray that works locally over time to decrease the inflammation in your nose/sinuses and help with allergic symptoms. This is not an quick- relief spray like afrin, but it works well if used daily.   Discontinue if you develop nose bleed  - Use nasal saline prior to Flonase.  - Use Ocean Spray Nasal Saline 1-3 puffs each nostril every 2-3 hours then blow out onto tissue. This is to irrigate the nasal passage way to clear the sinus openings. Use until sinus problem resolved.    - A Neti Pot with sterile saline can help break up nasal congestion and give relief.      - Warm salt water gargles can help with sore throat     - Warm tea with honey can help with sore throat and cough. Honey is a natural cough suppressant.    - Acetaminophen (tylenol) or Ibuprofen (advil,motrin) as directed as needed for fever/pain. Avoid tylenol if you have a history of liver disease. Do not take ibuprofen if you have a history of GI bleeding, kidney disease, or if you take blood thinners.   - Ibuprofen dosing for adults: 400 mg by mouth every 4-6 hours as needed. Max: 2400 mg/day; Info: use lowest effective dose, shortest effective treatment duration; give w/ food if GI upset occurs.  - Tylenol dosing for adults: [By mouth route, immediate-release form] Dose: 325-1000 mg by mouth every 4-6h as needed; Max: 1 g/4h and 4 g/day from all sources. [By mouth route, extended-release form] Dose: 650-1300 mg Extended Release by mouth every 8h as needed; Max: 4 g/day from all sources.     - You must understand that you have received an Urgent Care treatment only and that you may be released before all of your medical problems are known or treated.   - You, the patient, will arrange for follow up care as instructed.   - If your condition worsens or fails to improve we recommend that you receive another evaluation at the ER immediately or contact your PCP to discuss your concerns or return here.   - Follow up with your PCP or specialty clinic as directed in the next 1-2 weeks if not improved or as needed.  You can call [phone] to schedule an appointment with the appropriate provider.    If your symptoms do not improve or worsen, go to the  emergency room immediately.

## 2023-01-02 NOTE — PROGRESS NOTES
"Subjective:       Patient ID: Osmel Nair is a 52 y.o. male.    Vitals:  height is 5' 10" (1.778 m) and weight is 88.5 kg (195 lb). His oral temperature is 99.2 °F (37.3 °C). His blood pressure is 120/85 and his pulse is 95. His respiration is 18 and oxygen saturation is 98%.     Chief Complaint: Cough    Pt had a positive at home covid test on Saturday. Pt still has a cough, runny nose, scratchy throat, and body aches that started Friday. Has been taking mucinex and ibuprofen for symptoms with mild relief. He is vaccinated against covid.     Cough  This is a new problem. Episode onset: 3 days ago. The problem has been gradually worsening. The cough is Productive of sputum. Associated symptoms include ear pain, headaches, myalgias, nasal congestion, a sore throat and sweats. Pertinent negatives include no chest pain, chills, ear congestion, fever, heartburn, hemoptysis, postnasal drip, rash, rhinorrhea, shortness of breath, weight loss or wheezing. Nothing aggravates the symptoms. Treatments tried: mucinex, motrin, afrin. The treatment provided moderate relief. There is no history of asthma, bronchiectasis, bronchitis, emphysema, environmental allergies or pneumonia.     Constitution: Positive for fatigue. Negative for chills and fever.   HENT:  Positive for ear pain, congestion, sinus pressure and sore throat. Negative for postnasal drip.    Cardiovascular:  Negative for chest pain.   Respiratory:  Positive for cough. Negative for bloody sputum, shortness of breath and wheezing.    Gastrointestinal:  Negative for heartburn.   Musculoskeletal:  Positive for muscle ache.   Skin:  Negative for rash.   Allergic/Immunologic: Negative for environmental allergies.   Neurological:  Positive for headaches. Negative for disorientation and altered mental status.   Psychiatric/Behavioral:  Negative for altered mental status and disorientation.      Objective:      Physical Exam   Constitutional: He is oriented to " person, place, and time. He appears well-developed. He is cooperative.  Non-toxic appearance. He does not appear ill. No distress.      Comments:Patient sits comfortably in exam chair. Answers questions in complete sentences. Does not show any signs of distress or discoloration.        HENT:   Head: Normocephalic and atraumatic.   Ears:   Right Ear: Hearing, tympanic membrane, external ear and ear canal normal.   Left Ear: Hearing, tympanic membrane, external ear and ear canal normal.   Nose: Rhinorrhea and congestion present. No mucosal edema or nasal deformity. No epistaxis. Right sinus exhibits no maxillary sinus tenderness and no frontal sinus tenderness. Left sinus exhibits no maxillary sinus tenderness and no frontal sinus tenderness.   Mouth/Throat: Uvula is midline and mucous membranes are normal. No trismus in the jaw. Normal dentition. No uvula swelling. Posterior oropharyngeal erythema present. No oropharyngeal exudate or posterior oropharyngeal edema.   Eyes: Conjunctivae and lids are normal. No scleral icterus.   Neck: Trachea normal and phonation normal. Neck supple. No edema present. No erythema present. No neck rigidity present.   Cardiovascular: Normal rate, regular rhythm, normal heart sounds and normal pulses.   Pulmonary/Chest: Effort normal and breath sounds normal. No stridor. No respiratory distress. He has no decreased breath sounds. He has no wheezes. He has no rhonchi. He has no rales.   Abdominal: Normal appearance.   Musculoskeletal: Normal range of motion.         General: No deformity. Normal range of motion.   Neurological: He is alert and oriented to person, place, and time. He exhibits normal muscle tone. Coordination normal.   Skin: Skin is warm, dry, intact, not diaphoretic and not pale.   Psychiatric: His speech is normal and behavior is normal. Judgment and thought content normal.   Nursing note and vitals reviewed.      Results for orders placed or performed in visit on 01/02/23    SARS Coronavirus 2 Antigen, POCT Manual Read   Result Value Ref Range    SARS Coronavirus 2 Antigen Positive (A) Negative     Acceptable Yes        Assessment:       1. COVID-19    2. Cough, unspecified type          Plan:         COVID-19  -     nirmatrelvir-ritonavir (PAXLOVID, EUA,) 300 mg (150 mg x 2)-100 mg copackaged tablets (EUA); Take 3 tablets by mouth 2 (two) times daily. Each dose contains 2 nirmatrelvir (pink tablets) and 1 ritonavir (white tablet). Take all 3 tablets together  Dispense: 30 tablet; Refill: 0    Cough, unspecified type  -     SARS Coronavirus 2 Antigen, POCT Manual Read  -     promethazine-dextromethorphan (PROMETHAZINE-DM) 6.25-15 mg/5 mL Syrp; Take 5 mLs by mouth every 4 (four) hours as needed (cough).  Dispense: 180 mL; Refill: 0  -     benzonatate (TESSALON) 200 MG capsule; Take 1 capsule (200 mg total) by mouth 3 (three) times daily as needed for Cough.  Dispense: 30 capsule; Refill: 0                   Patient Instructions   Directions for taking Paxlovid:   Cholesterol Medications: if you are taking a statin for cholesterol you should start paxlovid 12 hours after your last dose of your statin medication. DO NOT take your statin medication while taking Paxlovid. Restart your statin 5 days after you take your last dose of the Paxlovid. If you are confused or have any question, please call the clinic or your primary care for clarification.      You have tested positive for COVID-19 today.       ISOLATION  If you tested positive and do not have symptoms, you must isolate for 5 days starting on the day of the positive test. I     If you tested positive and have symptoms, you must isolate for 5 days starting on the day of the first symptoms,  not the day of the positive test.     This is the most important part, both the CDC and the [facility] emphasize that you do not test out of isolation.     After 5 days, if your symptoms have improved and you have not had fever on day 5,  you can return to the community on day 6- NO TESTING REQUIRED!      In fact, we do not retest if you were positive in the last 90 days.     After your 5 days of isolation are completed, the CDC recommends strict mask use for the first 5 days that you come out of isolation.    - Rest.    - Drink plenty of fluids.     - You can take over-the-counter claritin, zyrtec, allegra, or xyzal as directed. These are antihistamines that can help with runny nose, nasal congestion, sneezing, and helps to dry up post-nasal drip, which usually causes sore throat and cough.    - You can take plain Mucinex (guaifenesin) 1200 mg twice a day to help loosen mucous.      - You can use Flonase (fluticasone) nasal spray as directed for sinus congestion and postnasal drip. This is a steroid nasal spray that works locally over time to decrease the inflammation in your nose/sinuses and help with allergic symptoms. This is not an quick- relief spray like afrin, but it works well if used daily.  Discontinue if you develop nose bleed  - Use nasal saline prior to Flonase.  - Use Ocean Spray Nasal Saline 1-3 puffs each nostril every 2-3 hours then blow out onto tissue. This is to irrigate the nasal passage way to clear the sinus openings. Use until sinus problem resolved.    - A Neti Pot with sterile saline can help break up nasal congestion and give relief.      - Warm salt water gargles can help with sore throat     - Warm tea with honey can help with sore throat and cough. Honey is a natural cough suppressant.    - Acetaminophen (tylenol) or Ibuprofen (advil,motrin) as directed as needed for fever/pain. Avoid tylenol if you have a history of liver disease. Do not take ibuprofen if you have a history of GI bleeding, kidney disease, or if you take blood thinners.   - Ibuprofen dosing for adults: 400 mg by mouth every 4-6 hours as needed. Max: 2400 mg/day; Info: use lowest effective dose, shortest effective treatment duration; give w/ food if GI  upset occurs.  - Tylenol dosing for adults: [By mouth route, immediate-release form] Dose: 325-1000 mg by mouth every 4-6h as needed; Max: 1 g/4h and 4 g/day from all sources. [By mouth route, extended-release form] Dose: 650-1300 mg Extended Release by mouth every 8h as needed; Max: 4 g/day from all sources.     - You must understand that you have received an Urgent Care treatment only and that you may be released before all of your medical problems are known or treated.   - You, the patient, will arrange for follow up care as instructed.   - If your condition worsens or fails to improve we recommend that you receive another evaluation at the ER immediately or contact your PCP to discuss your concerns or return here.   - Follow up with your PCP or specialty clinic as directed in the next 1-2 weeks if not improved or as needed.  You can call [phone] to schedule an appointment with the appropriate provider.    If your symptoms do not improve or worsen, go to the emergency room immediately.

## 2023-01-03 ENCOUNTER — TELEPHONE (OUTPATIENT)
Dept: INTERNAL MEDICINE | Facility: CLINIC | Age: 53
End: 2023-01-03
Payer: COMMERCIAL

## 2023-01-03 NOTE — TELEPHONE ENCOUNTER
Pt's message was sent to in basket over the weekend when office is closed. Since then pt has also sent portal message and gone to  and was given Paxlovid.

## 2023-01-03 NOTE — TELEPHONE ENCOUNTER
----- Message from Raquel Liao sent at 12/31/2022 11:20 AM CST -----  Contact: Pt [phone]  [email or web address]     Patient is calling for Medical Advice regarding: Scratchy Throat body aches chills     How long has patient had these symptoms:1 day     Pharmacy name and phone#:  [id number] - [place], [place] - [address] [place] [address] AT [address]  [address] [facility] [address]  Phone: [phone] Fax: [phone]      Would like response via [employer]:  call back     Comments: Positive home covid test

## 2023-01-17 ENCOUNTER — PATIENT MESSAGE (OUTPATIENT)
Dept: INTERNAL MEDICINE | Facility: CLINIC | Age: 53
End: 2023-01-17
Payer: COMMERCIAL

## 2023-01-17 DIAGNOSIS — E11.9 TYPE 2 DIABETES MELLITUS WITHOUT COMPLICATION, UNSPECIFIED WHETHER LONG TERM INSULIN USE: Primary | ICD-10-CM

## 2023-01-19 ENCOUNTER — LAB VISIT (OUTPATIENT)
Dept: LAB | Facility: OTHER | Age: 53
End: 2023-01-19
Attending: FAMILY MEDICINE
Payer: COMMERCIAL

## 2023-01-19 DIAGNOSIS — E11.9 TYPE 2 DIABETES MELLITUS WITHOUT COMPLICATION, UNSPECIFIED WHETHER LONG TERM INSULIN USE: ICD-10-CM

## 2023-01-19 LAB
ALBUMIN/CREAT UR: 4.1 UG/MG (ref 0–30)
CREAT UR-MCNC: 171.9 MG/DL (ref 23–375)
MICROALBUMIN UR DL<=1MG/L-MCNC: 7 UG/ML

## 2023-01-19 PROCEDURE — 82570 ASSAY OF URINE CREATININE: CPT | Performed by: FAMILY MEDICINE

## 2023-01-31 ENCOUNTER — PATIENT MESSAGE (OUTPATIENT)
Dept: INTERNAL MEDICINE | Facility: CLINIC | Age: 53
End: 2023-01-31
Payer: COMMERCIAL

## 2023-01-31 ENCOUNTER — OFFICE VISIT (OUTPATIENT)
Dept: PODIATRY | Facility: CLINIC | Age: 53
End: 2023-01-31
Payer: COMMERCIAL

## 2023-01-31 VITALS — RESPIRATION RATE: 19 BRPM | WEIGHT: 205 LBS | HEIGHT: 70 IN | BODY MASS INDEX: 29.35 KG/M2

## 2023-01-31 DIAGNOSIS — E11.9 TYPE 2 DIABETES MELLITUS WITHOUT COMPLICATION, WITHOUT LONG-TERM CURRENT USE OF INSULIN: Primary | ICD-10-CM

## 2023-01-31 PROCEDURE — 3072F LOW RISK FOR RETINOPATHY: CPT | Mod: CPTII,S$GLB,, | Performed by: PODIATRIST

## 2023-01-31 PROCEDURE — 99999 PR PBB SHADOW E&M-EST. PATIENT-LVL III: ICD-10-PCS | Mod: PBBFAC,,, | Performed by: PODIATRIST

## 2023-01-31 PROCEDURE — 3008F BODY MASS INDEX DOCD: CPT | Mod: CPTII,S$GLB,, | Performed by: PODIATRIST

## 2023-01-31 PROCEDURE — 1159F MED LIST DOCD IN RCRD: CPT | Mod: CPTII,S$GLB,, | Performed by: PODIATRIST

## 2023-01-31 PROCEDURE — 3066F NEPHROPATHY DOC TX: CPT | Mod: CPTII,S$GLB,, | Performed by: PODIATRIST

## 2023-01-31 PROCEDURE — 1159F PR MEDICATION LIST DOCUMENTED IN MEDICAL RECORD: ICD-10-PCS | Mod: CPTII,S$GLB,, | Performed by: PODIATRIST

## 2023-01-31 PROCEDURE — 3072F PR LOW RISK FOR RETINOPATHY: ICD-10-PCS | Mod: CPTII,S$GLB,, | Performed by: PODIATRIST

## 2023-01-31 PROCEDURE — 99999 PR PBB SHADOW E&M-EST. PATIENT-LVL III: CPT | Mod: PBBFAC,,, | Performed by: PODIATRIST

## 2023-01-31 PROCEDURE — 3061F PR NEG MICROALBUMINURIA RESULT DOCUMENTED/REVIEW: ICD-10-PCS | Mod: CPTII,S$GLB,, | Performed by: PODIATRIST

## 2023-01-31 PROCEDURE — 99213 OFFICE O/P EST LOW 20 MIN: CPT | Mod: S$GLB,,, | Performed by: PODIATRIST

## 2023-01-31 PROCEDURE — 99213 PR OFFICE/OUTPT VISIT, EST, LEVL III, 20-29 MIN: ICD-10-PCS | Mod: S$GLB,,, | Performed by: PODIATRIST

## 2023-01-31 PROCEDURE — 3061F NEG MICROALBUMINURIA REV: CPT | Mod: CPTII,S$GLB,, | Performed by: PODIATRIST

## 2023-01-31 PROCEDURE — 3066F PR DOCUMENTATION OF TREATMENT FOR NEPHROPATHY: ICD-10-PCS | Mod: CPTII,S$GLB,, | Performed by: PODIATRIST

## 2023-01-31 PROCEDURE — 3008F PR BODY MASS INDEX (BMI) DOCUMENTED: ICD-10-PCS | Mod: CPTII,S$GLB,, | Performed by: PODIATRIST

## 2023-01-31 RX ORDER — ROSUVASTATIN CALCIUM 10 MG/1
10 TABLET, COATED ORAL DAILY
Qty: 90 TABLET | Refills: 3 | Status: SHIPPED | OUTPATIENT
Start: 2023-01-31 | End: 2023-06-07 | Stop reason: SDUPTHER

## 2023-01-31 RX ORDER — ROSUVASTATIN CALCIUM 10 MG/1
10 TABLET, COATED ORAL DAILY
COMMUNITY
End: 2023-01-31 | Stop reason: SDUPTHER

## 2023-01-31 NOTE — TELEPHONE ENCOUNTER
Care Due:                  Date            Visit Type   Department     Provider  --------------------------------------------------------------------------------                                MYCHART                              ANNUAL                              CHECKUP/PHY  [facility] INTERNAL  Kam Terry  Last Visit: 02-      [facility]  Next Visit: None Scheduled  None         None Found                                                            Last  Test          Frequency    Reason                     Performed    Due Date  --------------------------------------------------------------------------------    Cr..........  12 months..  metFORMIN................  05- 05-    [facility] Embedded Care Gaps. Reference number: [id number]. 1/31/2023   3:37:16 PM CST

## 2023-01-31 NOTE — PROGRESS NOTES
Subjective:      Patient ID: Osmel Nair is a 52 y.o. male.    Chief Complaint: PCP (Kam Phipps MD 2/23/22) and Diabetic Foot Exam (Yearly exam )    Osmel is a 52 y.o. male who presents to the clinic upon referral from Dr. Eli parsons. provider found  for evaluation and treatment of diabetic feet. Osmel has a past medical history of Diabetes mellitus, type 2 (07/2016) and Lyme disease (2016). Patient relates no major problem with feet. Only complaints today consist of yearly comprehensive diabetic  foot examination. Slight increase in A1c, working on improvement.    PCP: Kam Phipps MD    Date Last Seen by PCP:   Chief Complaint   Patient presents with    PCP     Kam Phipps MD 2/23/22    Diabetic Foot Exam     Yearly exam          Current shoe gear:  Florencio     Hemoglobin A1C   Date Value Ref Range Status   12/01/2022 7.5 (H) 4.0 - 5.6 % Final     Comment:     ADA Screening Guidelines:  5.7-6.4%  Consistent with prediabetes  >or=6.5%  Consistent with diabetes    High levels of fetal hemoglobin interfere with the HbA1C  assay. Heterozygous hemoglobin variants (HbS, HgC, etc)do  not significantly interfere with this assay.   However, presence of multiple variants may affect accuracy.     05/06/2022 6.9 (H) 4.0 - 5.6 % Final     Comment:     ADA Screening Guidelines:  5.7-6.4%  Consistent with prediabetes  >or=6.5%  Consistent with diabetes    High levels of fetal hemoglobin interfere with the HbA1C  assay. Heterozygous hemoglobin variants (HbS, HgC, etc)do  not significantly interfere with this assay.   However, presence of multiple variants may affect accuracy.     10/07/2021 6.8 (H) 4.0 - 5.6 % Final     Comment:     ADA Screening Guidelines:  5.7-6.4%  Consistent with prediabetes  >or=6.5%  Consistent with diabetes    High levels of fetal hemoglobin interfere with the HbA1C  assay. Heterozygous hemoglobin variants (HbS, HgC, etc)do  not significantly interfere with this  assay.   However, presence of multiple variants may affect accuracy.           Review of Systems   Constitutional: Negative for chills, decreased appetite and fever.   Cardiovascular:  Negative for leg swelling.   Musculoskeletal:  Negative for arthritis, joint pain, joint swelling and myalgias.   Gastrointestinal:  Negative for nausea and vomiting.   Neurological:  Negative for loss of balance, numbness and paresthesias.         Objective:      Physical Exam  Vitals and nursing note reviewed.   Constitutional:       General: He is not in acute distress.     Appearance: He is well-developed. He is not toxic-appearing or diaphoretic.      Comments: alert and oriented x 3.    Cardiovascular:      Pulses:           Dorsalis pedis pulses are 2+ on the right side and 2+ on the left side.        Posterior tibial pulses are 2+ on the right side and 2+ on the left side.      Comments:  Capillary refill time is within normal limits. Digital hair present.   Pulmonary:      Effort: No respiratory distress.   Musculoskeletal:         General: No deformity.      Right ankle: No tenderness. No lateral malleolus, medial malleolus, AITF ligament, CF ligament or posterior TF ligament tenderness.      Right Achilles Tendon: No defects. Buckley's test negative.      Left ankle: No tenderness. No lateral malleolus, medial malleolus, AITF ligament, CF ligament or posterior TF ligament tenderness.      Left Achilles Tendon: No defects. Buckley's test negative.      Right foot: No tenderness or bony tenderness.      Left foot: No tenderness or bony tenderness.      Comments: Adequate joint range of motion without pain, limitation, nor crepitation Bilateral feet and ankle joints. Muscle strength is 5/5 in all groups bilaterally.           Feet:      Right foot:      Protective Sensation: 5 sites tested.  5 sites sensed.      Skin integrity: Skin integrity normal.      Left foot:      Protective Sensation: 5 sites tested.  5 sites sensed.       Skin integrity: Skin integrity normal.   Lymphadenopathy:      Comments: No lymphatic streaking     Skin:     General: Skin is warm and dry.      Coloration: Skin is not pale.      Findings: No rash.      Nails: There is no clubbing.      Comments: Skin is of normal turgor.   Normal temperature gradient.  Examination of the skin reveals no evidence of significant rashes, open lesions, suspicious appearing nevi or other concerning lesions.        Toenails 1-5 bilaterally are neatly trimmed; of normal color and thickness   Neurological:      Sensory: No sensory deficit.      Motor: No atrophy.      Comments: Light touch present     Psychiatric:         Attention and Perception: He is attentive.         Mood and Affect: Mood is not anxious. Affect is not inappropriate.         Speech: He is communicative. Speech is not slurred.         Behavior: Behavior is not combative.             Assessment:       Encounter Diagnosis   Name Primary?    Type 2 diabetes mellitus without complication, without long-term current use of insulin Yes         Plan:       Osmel was seen today for pcp and diabetic foot exam.    Diagnoses and all orders for this visit:    Type 2 diabetes mellitus without complication, without long-term current use of insulin      I counseled the patient on his conditions, their implications and medical management.        - Shoe inspection. Diabetic Foot Education. Patient reminded of the importance of good nutrition and blood sugar control to help prevent podiatric complications of diabetes. Patient instructed on proper foot hygeine. We discussed wearing proper shoe gear, daily foot inspections, never walking without protective shoe gear, caution putting sharp instruments to feet     - Discussed DM foot care:  Wear comfortable, proper fitting shoes. Wash feet daily. Dry well. After drying, apply moisturizer to feet (no lotion to webspaces). Inspect feet daily for skin breaks, blisters, swelling, or  redness. Wear cotton socks (preferably white)  Change socks every day. Do NOT walk barefoot. Do NOT use heating pads or warm/hot water soaks     - Discussed importance of daily moisturizer to the feet such as Cerave,Sween, Or Cetaphil    - Patient is low risk for developing lower extremity issues secondary to diabetes.     - I recommend continued yearly diabetic foot examinations by Myself or PCP    - Currently  patient has NO pedal manifestations of DM    - Patients with out pedal manifestations of DM, do not qualify for Diabetic Shoes/Inserts nor  nail/callus trimming     - RTC in 1 yr or  PRN   .

## 2023-04-12 DIAGNOSIS — E11.9 TYPE 2 DIABETES MELLITUS WITHOUT COMPLICATION, UNSPECIFIED WHETHER LONG TERM INSULIN USE: ICD-10-CM

## 2023-04-27 ENCOUNTER — LAB VISIT (OUTPATIENT)
Dept: LAB | Facility: OTHER | Age: 53
End: 2023-04-27
Attending: FAMILY MEDICINE
Payer: COMMERCIAL

## 2023-04-27 ENCOUNTER — OFFICE VISIT (OUTPATIENT)
Dept: INTERNAL MEDICINE | Facility: CLINIC | Age: 53
End: 2023-04-27
Attending: FAMILY MEDICINE
Payer: COMMERCIAL

## 2023-04-27 VITALS
SYSTOLIC BLOOD PRESSURE: 120 MMHG | OXYGEN SATURATION: 96 % | HEART RATE: 98 BPM | HEIGHT: 70 IN | BODY MASS INDEX: 29.68 KG/M2 | WEIGHT: 207.31 LBS | DIASTOLIC BLOOD PRESSURE: 80 MMHG

## 2023-04-27 DIAGNOSIS — E11.9 TYPE 2 DIABETES MELLITUS WITHOUT COMPLICATION, UNSPECIFIED WHETHER LONG TERM INSULIN USE: ICD-10-CM

## 2023-04-27 DIAGNOSIS — E78.01 FAMILIAL HYPERCHOLESTEROLEMIA: ICD-10-CM

## 2023-04-27 DIAGNOSIS — Z00.00 PREVENTATIVE HEALTH CARE: Primary | ICD-10-CM

## 2023-04-27 DIAGNOSIS — E11.65 TYPE 2 DIABETES MELLITUS WITH HYPERGLYCEMIA, WITHOUT LONG-TERM CURRENT USE OF INSULIN: ICD-10-CM

## 2023-04-27 DIAGNOSIS — R06.83 SNORING: ICD-10-CM

## 2023-04-27 LAB
ESTIMATED AVG GLUCOSE: 177 MG/DL (ref 68–131)
HBA1C MFR BLD: 7.8 % (ref 4–5.6)

## 2023-04-27 PROCEDURE — 99999 PR PBB SHADOW E&M-EST. PATIENT-LVL IV: CPT | Mod: PBBFAC,,, | Performed by: FAMILY MEDICINE

## 2023-04-27 PROCEDURE — 83036 HEMOGLOBIN GLYCOSYLATED A1C: CPT | Performed by: FAMILY MEDICINE

## 2023-04-27 PROCEDURE — 3066F PR DOCUMENTATION OF TREATMENT FOR NEPHROPATHY: ICD-10-PCS | Mod: CPTII,S$GLB,, | Performed by: FAMILY MEDICINE

## 2023-04-27 PROCEDURE — 3079F PR MOST RECENT DIASTOLIC BLOOD PRESSURE 80-89 MM HG: ICD-10-PCS | Mod: CPTII,S$GLB,, | Performed by: FAMILY MEDICINE

## 2023-04-27 PROCEDURE — 1160F RVW MEDS BY RX/DR IN RCRD: CPT | Mod: CPTII,S$GLB,, | Performed by: FAMILY MEDICINE

## 2023-04-27 PROCEDURE — 1160F PR REVIEW ALL MEDS BY PRESCRIBER/CLIN PHARMACIST DOCUMENTED: ICD-10-PCS | Mod: CPTII,S$GLB,, | Performed by: FAMILY MEDICINE

## 2023-04-27 PROCEDURE — 3079F DIAST BP 80-89 MM HG: CPT | Mod: CPTII,S$GLB,, | Performed by: FAMILY MEDICINE

## 2023-04-27 PROCEDURE — 3008F PR BODY MASS INDEX (BMI) DOCUMENTED: ICD-10-PCS | Mod: CPTII,S$GLB,, | Performed by: FAMILY MEDICINE

## 2023-04-27 PROCEDURE — 99999 PR PBB SHADOW E&M-EST. PATIENT-LVL IV: ICD-10-PCS | Mod: PBBFAC,,, | Performed by: FAMILY MEDICINE

## 2023-04-27 PROCEDURE — 99396 PREV VISIT EST AGE 40-64: CPT | Mod: S$GLB,,, | Performed by: FAMILY MEDICINE

## 2023-04-27 PROCEDURE — 99396 PR PREVENTIVE VISIT,EST,40-64: ICD-10-PCS | Mod: S$GLB,,, | Performed by: FAMILY MEDICINE

## 2023-04-27 PROCEDURE — 3066F NEPHROPATHY DOC TX: CPT | Mod: CPTII,S$GLB,, | Performed by: FAMILY MEDICINE

## 2023-04-27 PROCEDURE — 3072F LOW RISK FOR RETINOPATHY: CPT | Mod: CPTII,S$GLB,, | Performed by: FAMILY MEDICINE

## 2023-04-27 PROCEDURE — 3072F PR LOW RISK FOR RETINOPATHY: ICD-10-PCS | Mod: CPTII,S$GLB,, | Performed by: FAMILY MEDICINE

## 2023-04-27 PROCEDURE — 3074F PR MOST RECENT SYSTOLIC BLOOD PRESSURE < 130 MM HG: ICD-10-PCS | Mod: CPTII,S$GLB,, | Performed by: FAMILY MEDICINE

## 2023-04-27 PROCEDURE — 1159F PR MEDICATION LIST DOCUMENTED IN MEDICAL RECORD: ICD-10-PCS | Mod: CPTII,S$GLB,, | Performed by: FAMILY MEDICINE

## 2023-04-27 PROCEDURE — 3061F PR NEG MICROALBUMINURIA RESULT DOCUMENTED/REVIEW: ICD-10-PCS | Mod: CPTII,S$GLB,, | Performed by: FAMILY MEDICINE

## 2023-04-27 PROCEDURE — 3008F BODY MASS INDEX DOCD: CPT | Mod: CPTII,S$GLB,, | Performed by: FAMILY MEDICINE

## 2023-04-27 PROCEDURE — 1159F MED LIST DOCD IN RCRD: CPT | Mod: CPTII,S$GLB,, | Performed by: FAMILY MEDICINE

## 2023-04-27 PROCEDURE — 3061F NEG MICROALBUMINURIA REV: CPT | Mod: CPTII,S$GLB,, | Performed by: FAMILY MEDICINE

## 2023-04-27 PROCEDURE — 3074F SYST BP LT 130 MM HG: CPT | Mod: CPTII,S$GLB,, | Performed by: FAMILY MEDICINE

## 2023-04-27 PROCEDURE — 36415 COLL VENOUS BLD VENIPUNCTURE: CPT | Performed by: FAMILY MEDICINE

## 2023-04-27 NOTE — PROGRESS NOTES
[facility]  Progress Note   Patient Name: Osmel Nair   MRN: [id number]   Primary Care Physician: Kam Phipps     Subjective:   Chief Complaint: Annual, excessive snoring     History of Presenting Illness: Patient is a pleasant 52 y.o. male with history of T2DM who presents to clinic for his annual visit. Patient reports he was told he snores excessively throughout the night with episodes of stopping breathing. Patient also notes some daytime fatigue. He has not been dx with DAVID and inquires what further workup he could undergo to allow him to get a more refreshing sleep.  Patient has T2DM on metformin 500mg BID. He reports he takes his medication regularly as prescribed and checks his preprandial blood sugar every morning - ave 113 to 130. His most recent HbA1c was 7.5.     Patient denies fever/chills, nausea/vomiting, headache, falls, changes in vision, new weakness/numbness/tingling, SOB, CP, rash, bone or joints pain, hematuria or hematochezia.    Past Medical History:   Diagnosis Date    Diabetes mellitus, type 2 07/2016    Lyme disease 2016     Past Surgical History:   Procedure Laterality Date    EPIDURAL STEROID INJECTION N/A 8/30/2019    Procedure: INJECTION, STEROID, EPIDURAL, CERVICAL C7-T1 INTERLAMINAR need consent;  Surgeon: Uzma Ulloa MD;  Location[place] [facility]  Service: Pain Management;  Laterality: N/A;    EPIDURAL STEROID INJECTION N/A 1/10/2022    Procedure: Injection, Steroid, Epidural C7/T1;  Surgeon: Otis Galindo MD;  Location: [facility];  Service: Pain Management;  Laterality: N/A;    KNEE ARTHROSCOPY Right 1989     Current Outpatient Medications on File Prior to Visit   Medication Sig Dispense Refill    CONTOUR NEXT TEST STRIPS Strp USE THREE TIMES DAILY 300 strip 0    metFORMIN (GLUCOPHAGE) 500 MG tablet Take 1 tablet (500 mg total) by mouth 2 (two) times daily with meals. 180 tablet 0    rosuvastatin (CRESTOR) 10 MG tablet Take 1 tablet (10 mg total)  "by mouth once daily. 90 tablet 3    nicotine (NICODERM CQ) 14 mg/24 hr Place 1 patch onto the skin once daily. (Patient not taking: Reported on 4/27/2023) 28 patch 0    nicotine, polacrilex, (NICORETTE) 2 mg Gum Take 1 each (2 mg total) by mouth as needed (Use in place of cigarettes). (Patient not taking: Reported on 4/27/2023) 200 each 0    nirmatrelvir-ritonavir (PAXLOVID, EUA,) 300 mg (150 mg x 2)-100 mg copackaged tablets (EUA) Take 3 tablets by mouth 2 (two) times daily. Each dose contains 2 nirmatrelvir (pink tablets) and 1 ritonavir (white tablet). Take all 3 tablets together (Patient not taking: Reported on 4/27/2023) 30 tablet 0     No current facility-administered medications on file prior to visit.     Review of Systems   Constitutional:  Positive for malaise/fatigue. Negative for chills, diaphoresis, fever and weight loss.   HENT:  Negative for congestion, nosebleeds and sore throat.    Eyes:  Negative for blurred vision and double vision.   Respiratory:  Negative for cough, hemoptysis, shortness of breath and wheezing.    Cardiovascular:  Negative for chest pain and palpitations.   Gastrointestinal:  Negative for abdominal pain, constipation, diarrhea, nausea and vomiting.   Genitourinary:  Negative for dysuria, flank pain, hematuria and urgency.   Musculoskeletal:  Negative for falls, joint pain and myalgias.   Skin:  Negative for rash.   Neurological:  Negative for dizziness, tingling, tremors, weakness and headaches.   Endo/Heme/Allergies:  Does not bruise/bleed easily.   Psychiatric/Behavioral:  Negative for depression.      Objective:   /80   Pulse 98   Ht 5' 10" (1.778 m)   Wt 94 kg (207 lb 5.5 oz)   SpO2 96%   BMI 29.75 kg/m²     Physical Exam:  General: Well/ill appearing, Not in distress  Head: Non-traumatic, normocephalic  Neck: Supple, no tenderness to palpation, no lymphadenopathy  CVS: Dual heart sounds, no additional sounds, regular rate and rhythm, distal pulses equal and " strong  Pulm: Symmetric expansion, no wheeze, rhonchi, rales  GI: Abdomen nondistended, soft, nontender  MSK: Moves all extremities without restriction, atraumatic  Skin: Dry, intact  Psych: Normal thought content and cognition    No results for input(s): CBC in the last 72 hours.   No results for input(s): CBC in the last 72 hours.     No images are attached to the encounter.    Assessment and Plan:   Patient is a 52 year old male with history of T2DM    Patient Active Problem List   Diagnosis    Left cervical radiculopathy    Myalgia and myositis    Type 2 diabetes mellitus without complication, without long-term current use of insulin    Hyperlipidemia type II    Cervical radicular pain    Cervical spondylosis    Cervical radiculopathy    Familial hypercholesterolemia    Muscle pain    Type 2 diabetes mellitus without complication    DDD (degenerative disc disease), cervical       Referral to sleep medicine for a sleep study  2.   Ordered HbA1c; we will follow up with results      Scribed by  Jun Coats  Med Student

## 2023-05-01 ENCOUNTER — PATIENT MESSAGE (OUTPATIENT)
Dept: INTERNAL MEDICINE | Facility: CLINIC | Age: 53
End: 2023-05-01
Payer: COMMERCIAL

## 2023-05-01 DIAGNOSIS — E11.9 TYPE 2 DIABETES MELLITUS WITHOUT COMPLICATION, WITHOUT LONG-TERM CURRENT USE OF INSULIN: Primary | ICD-10-CM

## 2023-05-02 ENCOUNTER — TELEPHONE (OUTPATIENT)
Dept: INTERNAL MEDICINE | Facility: CLINIC | Age: 53
End: 2023-05-02
Payer: COMMERCIAL

## 2023-05-02 NOTE — TELEPHONE ENCOUNTER
Osmel Nair  As you are aware your A1c has gone up slightly.  But it is still acceptable.  Continue your current medication and follow-up in 6 months.  Dr Phipps   Written by Kam Phipps MD on 5/1/2023  3:03 PM CDT  Seen by patient Osmel Nair on 5/1/2023  3:11 PM

## 2023-05-12 ENCOUNTER — PATIENT MESSAGE (OUTPATIENT)
Dept: RESEARCH | Facility: HOSPITAL | Age: 53
End: 2023-05-12
Payer: COMMERCIAL

## 2023-06-07 ENCOUNTER — CLINICAL SUPPORT (OUTPATIENT)
Dept: SMOKING CESSATION | Facility: CLINIC | Age: 53
End: 2023-06-07
Payer: COMMERCIAL

## 2023-06-07 DIAGNOSIS — E78.01 HYPERLIPIDEMIA TYPE II: ICD-10-CM

## 2023-06-07 DIAGNOSIS — F17.200 NICOTINE DEPENDENCE: Primary | ICD-10-CM

## 2023-06-07 DIAGNOSIS — E11.9 TYPE 2 DIABETES MELLITUS WITHOUT COMPLICATION, WITHOUT LONG-TERM CURRENT USE OF INSULIN: ICD-10-CM

## 2023-06-07 DIAGNOSIS — Z00.00 PREVENTATIVE HEALTH CARE: ICD-10-CM

## 2023-06-07 PROCEDURE — 99407 PR TOBACCO USE CESSATION INTENSIVE >10 MINUTES: ICD-10-PCS | Mod: S$GLB,,, | Performed by: GENERAL PRACTICE

## 2023-06-07 PROCEDURE — 99407 BEHAV CHNG SMOKING > 10 MIN: CPT | Mod: S$GLB,,, | Performed by: GENERAL PRACTICE

## 2023-06-07 PROCEDURE — 99999 PR PBB SHADOW E&M-EST. PATIENT-LVL I: ICD-10-PCS | Mod: PBBFAC,,,

## 2023-06-07 PROCEDURE — 99999 PR PBB SHADOW E&M-EST. PATIENT-LVL I: CPT | Mod: PBBFAC,,,

## 2023-06-07 RX ORDER — METFORMIN HYDROCHLORIDE 500 MG/1
500 TABLET ORAL 2 TIMES DAILY WITH MEALS
Qty: 180 TABLET | Refills: 0 | Status: SHIPPED | OUTPATIENT
Start: 2023-06-07 | End: 2023-09-12 | Stop reason: SDUPTHER

## 2023-06-07 RX ORDER — ROSUVASTATIN CALCIUM 10 MG/1
10 TABLET, COATED ORAL DAILY
Qty: 90 TABLET | Refills: 3 | Status: SHIPPED | OUTPATIENT
Start: 2023-06-07 | End: 2023-09-12 | Stop reason: SDUPTHER

## 2023-06-07 NOTE — PROGRESS NOTES
Spoke with patient today in regard to smoking cessation progress 3/6/12 month telephone follow up, he states that he is tobacco free.  Commended patient on the accomplishments thus far.  Informed patient of benefit period, future follow up, and contact information if any further help or support is needed.  Will complete smart form for 3/6/12 month follow up on Quit attempt #1 and resolve episode.

## 2023-06-07 NOTE — TELEPHONE ENCOUNTER
Refill Encounter    PCP Visits: Recent Visits  Date Type Provider Dept   04/27/23 Office Visit Kam Phipps MD [facility]   Showing recent visits within past 360 days and meeting all other requirements  Future Appointments  No visits were found meeting these conditions.  Showing future appointments within next 720 days and meeting all other requirements     Last 3 Blood Pressure:   BP Readings from Last 3 Encounters:   04/27/23 120/80   01/02/23 120/85   11/03/22 131/81     Preferred Pharmacy:   [id number] - [place], [place] - [address] [facility] [address] [place] [address]  [address] [facility] [address]  Phone: [phone] Fax: [phone]    [facility]  [address] [address]  Phone: [phone] Fax: [phone]    Requested RX:  Requested Prescriptions     Pending Prescriptions Disp Refills    rosuvastatin (CRESTOR) 10 MG tablet 90 tablet 3     Sig: Take 1 tablet (10 mg total) by mouth once daily.    metFORMIN (GLUCOPHAGE) 500 MG tablet 180 tablet 0     Sig: Take 1 tablet (500 mg total) by mouth 2 (two) times daily with meals.      RX Route: Normal

## 2023-06-07 NOTE — TELEPHONE ENCOUNTER
Care Due:                  Date            Visit Type   Department     Provider  --------------------------------------------------------------------------------                                MYCHART                              ANNUAL                              CHECKUP/PHY  [facility] [place] INTERNAL  Kam Terry  Last Visit: 04-      [facility]  Next Visit: None Scheduled  None         None Found                                                            Last  Test          Frequency    Reason                     Performed    Due Date  --------------------------------------------------------------------------------    CMP.........  12 months..  metFORMIN, rosuvastatin..  05- 05-    [facility] Embedded Care Due Messages. Reference number: [id number].   6/07/2023 12:24:21 PM CDT

## 2023-06-10 ENCOUNTER — PATIENT MESSAGE (OUTPATIENT)
Dept: INTERNAL MEDICINE | Facility: CLINIC | Age: 53
End: 2023-06-10
Payer: COMMERCIAL

## 2023-06-10 DIAGNOSIS — R30.0 DYSURIA: Primary | ICD-10-CM

## 2023-06-12 ENCOUNTER — LAB VISIT (OUTPATIENT)
Dept: LAB | Facility: OTHER | Age: 53
End: 2023-06-12
Attending: FAMILY MEDICINE
Payer: COMMERCIAL

## 2023-06-12 ENCOUNTER — PATIENT MESSAGE (OUTPATIENT)
Dept: INTERNAL MEDICINE | Facility: CLINIC | Age: 53
End: 2023-06-12
Payer: COMMERCIAL

## 2023-06-12 DIAGNOSIS — R30.0 DYSURIA: ICD-10-CM

## 2023-06-12 LAB
BILIRUB UR QL STRIP: NEGATIVE
CLARITY UR: CLEAR
COLOR UR: YELLOW
GLUCOSE UR QL STRIP: ABNORMAL
HGB UR QL STRIP: NEGATIVE
KETONES UR QL STRIP: NEGATIVE
LEUKOCYTE ESTERASE UR QL STRIP: NEGATIVE
NITRITE UR QL STRIP: NEGATIVE
PH UR STRIP: 7 [PH] (ref 5–8)
PROT UR QL STRIP: NEGATIVE
SP GR UR STRIP: 1.01 (ref 1–1.03)
URN SPEC COLLECT METH UR: ABNORMAL
UROBILINOGEN UR STRIP-ACNC: NEGATIVE EU/DL

## 2023-06-12 PROCEDURE — 81003 URINALYSIS AUTO W/O SCOPE: CPT | Performed by: FAMILY MEDICINE

## 2023-06-12 PROCEDURE — 87086 URINE CULTURE/COLONY COUNT: CPT | Performed by: FAMILY MEDICINE

## 2023-06-13 ENCOUNTER — PATIENT MESSAGE (OUTPATIENT)
Dept: INTERNAL MEDICINE | Facility: CLINIC | Age: 53
End: 2023-06-13
Payer: COMMERCIAL

## 2023-06-14 LAB — BACTERIA UR CULT: NO GROWTH

## 2023-08-01 ENCOUNTER — PATIENT MESSAGE (OUTPATIENT)
Dept: RESEARCH | Facility: HOSPITAL | Age: 53
End: 2023-08-01
Payer: COMMERCIAL

## 2023-08-14 ENCOUNTER — PATIENT MESSAGE (OUTPATIENT)
Dept: INTERNAL MEDICINE | Facility: CLINIC | Age: 53
End: 2023-08-14
Payer: COMMERCIAL

## 2023-08-16 ENCOUNTER — PATIENT OUTREACH (OUTPATIENT)
Dept: ADMINISTRATIVE | Facility: HOSPITAL | Age: 53
End: 2023-08-16
Payer: COMMERCIAL

## 2023-09-12 DIAGNOSIS — E78.01 HYPERLIPIDEMIA TYPE II: ICD-10-CM

## 2023-09-12 DIAGNOSIS — E11.9 TYPE 2 DIABETES MELLITUS WITHOUT COMPLICATION, WITHOUT LONG-TERM CURRENT USE OF INSULIN: ICD-10-CM

## 2023-09-12 DIAGNOSIS — Z00.00 PREVENTATIVE HEALTH CARE: ICD-10-CM

## 2023-09-12 RX ORDER — METFORMIN HYDROCHLORIDE 500 MG/1
500 TABLET ORAL 2 TIMES DAILY WITH MEALS
Qty: 180 TABLET | Refills: 0 | Status: SHIPPED | OUTPATIENT
Start: 2023-09-12 | End: 2023-12-03 | Stop reason: SDUPTHER

## 2023-09-12 RX ORDER — ROSUVASTATIN CALCIUM 10 MG/1
10 TABLET, COATED ORAL DAILY
Qty: 90 TABLET | Refills: 0 | Status: SHIPPED | OUTPATIENT
Start: 2023-09-12 | End: 2023-12-18 | Stop reason: SDUPTHER

## 2023-09-12 NOTE — TELEPHONE ENCOUNTER
Care Due:                  Date            Visit Type   Department     Provider  --------------------------------------------------------------------------------                                MYCHART                              ANNUAL                              CHECKUP/PHY  [facility] INTERNAL  Aristidestamikonando Harrison  Last Visit: 04-      [facility]  Next Visit: None Scheduled  None         None Found                                                            Last  Test          Frequency    Reason                     Performed    Due Date  --------------------------------------------------------------------------------    CMP.........  12 months..  metFORMIN, rosuvastatin..  05- 05-    HBA1C.......  6 months...  metFORMIN................  04-   10-    Lipid Panel.  12 months..  rosuvastatin.............  10-   10-    [facility] Embedded Care Due Messages. Reference number: [id number].   9/12/2023 10:44:45 AM CDT

## 2023-09-12 NOTE — TELEPHONE ENCOUNTER
Refill Routing Note   Medication(s) are not appropriate for processing by [facility] for the following reason(s):      Required labs outdated    [facility] action(s):  Defer Care Due:  Labs due              Appointments  past 12m or future 3m with PCP    Date Provider   Last Visit   4/27/2023 Kam Phipps MD   Next Visit   Visit date not found Kam Phipps MD   ED visits in past 90 days: 0        Note composed:3:09 PM 09/12/2023

## 2023-09-14 ENCOUNTER — IMMUNIZATION (OUTPATIENT)
Dept: INTERNAL MEDICINE | Facility: CLINIC | Age: 53
End: 2023-09-14
Payer: COMMERCIAL

## 2023-09-14 PROCEDURE — 90686 FLU VACCINE (QUAD) GREATER THAN OR EQUAL TO 3YO PRESERVATIVE FREE IM: ICD-10-PCS | Mod: S$GLB,,, | Performed by: INTERNAL MEDICINE

## 2023-09-14 PROCEDURE — 90686 IIV4 VACC NO PRSV 0.5 ML IM: CPT | Mod: S$GLB,,, | Performed by: INTERNAL MEDICINE

## 2023-09-14 PROCEDURE — 90471 IMMUNIZATION ADMIN: CPT | Mod: S$GLB,,, | Performed by: INTERNAL MEDICINE

## 2023-09-14 PROCEDURE — 90471 FLU VACCINE (QUAD) GREATER THAN OR EQUAL TO 3YO PRESERVATIVE FREE IM: ICD-10-PCS | Mod: S$GLB,,, | Performed by: INTERNAL MEDICINE

## 2023-10-11 ENCOUNTER — TELEPHONE (OUTPATIENT)
Dept: INTERNAL MEDICINE | Facility: CLINIC | Age: 53
End: 2023-10-11
Payer: COMMERCIAL

## 2023-10-11 DIAGNOSIS — E11.9 TYPE 2 DIABETES MELLITUS WITHOUT COMPLICATION, WITHOUT LONG-TERM CURRENT USE OF INSULIN: Primary | ICD-10-CM

## 2023-10-12 NOTE — TELEPHONE ENCOUNTER
Spoke with pt and scheduled a1c lab appt for tomorrow. But also wanted to add lipid panel. Dr. Rich ordered one about a year ago. Can I link that lipid to tomorrow's lab appt?

## 2023-10-17 ENCOUNTER — OFFICE VISIT (OUTPATIENT)
Dept: URGENT CARE | Facility: CLINIC | Age: 53
End: 2023-10-17
Payer: COMMERCIAL

## 2023-10-17 VITALS
RESPIRATION RATE: 14 BRPM | DIASTOLIC BLOOD PRESSURE: 92 MMHG | HEART RATE: 92 BPM | OXYGEN SATURATION: 99 % | BODY MASS INDEX: 29.67 KG/M2 | HEIGHT: 70 IN | WEIGHT: 207.25 LBS | TEMPERATURE: 99 F | SYSTOLIC BLOOD PRESSURE: 129 MMHG

## 2023-10-17 DIAGNOSIS — B34.9 VIRAL SYNDROME: Primary | ICD-10-CM

## 2023-10-17 DIAGNOSIS — R52 BODY ACHES: ICD-10-CM

## 2023-10-17 LAB
CTP QC/QA: YES
SARS-COV-2 AG RESP QL IA.RAPID: NEGATIVE

## 2023-10-17 PROCEDURE — 99213 OFFICE O/P EST LOW 20 MIN: CPT | Mod: S$GLB,,,

## 2023-10-17 PROCEDURE — 87811 SARS-COV-2 COVID19 W/OPTIC: CPT | Mod: QW,S$GLB,,

## 2023-10-17 PROCEDURE — 87811 SARS CORONAVIRUS 2 ANTIGEN POCT, MANUAL READ: ICD-10-PCS | Mod: QW,S$GLB,,

## 2023-10-17 PROCEDURE — 99213 PR OFFICE/OUTPT VISIT, EST, LEVL III, 20-29 MIN: ICD-10-PCS | Mod: S$GLB,,,

## 2023-10-17 NOTE — PATIENT INSTRUCTIONS
You are negative for COVID today.   Symptoms are likely viral at this time.     When sick with a viral illness, cover your mouth and nose when sneezing and coughing. Wash hands frequently. Sanitize areas at home. Wear a mask in public if you need. Stay home if you are having fevers, chills, body aches, fatigue. Symptoms should resolve in 7-14 days. There is no specific treatment for viral illnesses. We treat symptoms with supportive care. Getting plenty of rest but completing light activities daily, eating a well-balanced diet, drinking plenty of fluids, managing stress levels can aid in faster recovery.      Try a decongestant and corticosteroid nasal spray like flonase for the next few days for sinus relief. Initial: 2 sprays in each nostril once daily for 1 week. Reduce to 1 spray in each nostril once per day. Stop taking if you develop a nose bleed. Nasal saline spray can be used together with flonase to help moisten nostrils. An antihistamine like zyrtec, claritin, allegra can also be helpful for sinus relief and will help dry nasal passages.     Regular (Guaifenesin) Mucinex 1200 mg twice per day for 10 days can help thin secretions for better clearance. Drink plenty of fluids with this.     Honey is a natural cough suppressant.     -If you do NOT have high blood pressure, you may use a decongestant form (D)  of this medication (ie. Claritin- D, zyrtec-D, allegra-D, Mucinex-D) or if you do not take the D form, you can take sudafed (pseudoephedrine) over the counter, which is a powerful decongestant. Do NOT take two decongestant (D) medications at the same time (such as mucinex-D and claritin-D or plain sudafed and claritin D). Dextromethorphan (DM) is a cough suppressant over the counter (ie. mucinex DM, robitussin, delsym; dayquil/nyquil has DM as well.) Try Afrin for nasal congestion at bedtime. Only use for 3 days as this can cause a rebound of congestion. Try cepacol for sore throat.     If you do have  Hypertension or palpitations, it is safe to take Coricidin HBP (multi-symptom flu) for relief of sinus symptoms.  Try DASH diet to help lower BP and buy a blood pressure cuff for home monitoring. Check blood pressure at least 2 times per day and create a log. Avoid eating foods that are high in salt. Eat more foods with potassium, magnesium and calcium which will help dilate your vessels and decrease your BP.     Warm tea/warm liquids will help soothe the back of your throat. Warm water salt gurgles can also be helpful. A dry throat will cause pain. Make sure to stay hydrated. Water and pedilyte are the best to drink. Neti pot irrigation, humidifier in your room, avoiding fans, warm compresses to face, eating/drinking hot soups, hot shower before bedtime can help.     The recommended daily fluid intake for men is 3.7 liters (seven 16 oz bottles).    Alternate Tylenol and ibuprofen every 4 hours as needed for fever and body aches.  Please take NSAIDs with a full glass of water and food to avoid GI upset.      Please only use over the counter cough and cold medications for 3-5 days at a time to avoid rebound symptoms.     Getting plenty of rest can aid in a faster recovery of illnesses.     Please follow-up with your primary care provider or return to the clinic if not better/worsening symptoms in 1 week.     Report to the ER if you have chest pain, shortness of breath, palpitations.

## 2023-10-17 NOTE — PROGRESS NOTES
"Subjective:      Patient ID: Osmel Nair is a 53 y.o. male.    Vitals:  height is 5' 10" (1.778 m) and weight is 94 kg (207 lb 3.7 oz). His oral temperature is 98.8 °F (37.1 °C). His blood pressure is 129/92 (abnormal) and his pulse is 92. His respiration is 14 and oxygen saturation is 99%.     Chief Complaint: Headache    53 y.o male c/o body aches, nasal congestion and headache started x 5 days ago. Patient reports that he has a lot of sinus pressure and night sweats. Patient report that he been taking OTC decongestants and tylenol with relief. He reports symptoms are about the same and have not worsened. Body aches are bothering him the most. Mucous is clear.     Headache   This is a new problem. The current episode started in the past 7 days. The problem occurs constantly. The problem has been gradually worsening. The pain is located in the Frontal region. The pain does not radiate. The quality of the pain is described as aching. The pain is at a severity of 3/10. The pain is moderate. Associated symptoms include muscle aches, nausea, sinus pressure and weakness. Pertinent negatives include no abdominal pain, abnormal behavior, anorexia, back pain, blurred vision, coughing, dizziness, drainage, ear pain, eye pain, eye redness, eye watering, facial sweating, fever, hearing loss, insomnia, loss of balance, neck pain, numbness, phonophobia, photophobia, rhinorrhea, scalp tenderness, seizures, sore throat, swollen glands, tingling, tinnitus, visual change, vomiting or weight loss. Nothing aggravates the symptoms. He has tried nothing for the symptoms. The treatment provided no relief.       Constitution: Negative for chills and fever.   HENT:  Positive for congestion and sinus pressure. Negative for ear pain, tinnitus, hearing loss and sore throat.    Neck: Negative for neck pain.   Cardiovascular:  Negative for chest pain.   Eyes:  Negative for eye pain, eye redness, photophobia and blurred vision. "   Respiratory:  Negative for cough and shortness of breath.    Gastrointestinal:  Positive for nausea. Negative for abdominal pain, vomiting and diarrhea.   Musculoskeletal:  Positive for muscle ache. Negative for back pain.   Neurological:  Positive for headaches. Negative for dizziness, loss of balance, numbness and seizures.   Psychiatric/Behavioral:  The patient does not have insomnia.       Objective:     Physical Exam   Constitutional: He is oriented to person, place, and time. He appears well-developed. He is cooperative.  Non-toxic appearance. He does not appear ill. No distress.   HENT:   Head: Normocephalic and atraumatic.   Ears:   Right Ear: Hearing, tympanic membrane, external ear and ear canal normal.   Left Ear: Hearing, tympanic membrane, external ear and ear canal normal.   Nose: Nose normal. No mucosal edema, rhinorrhea or nasal deformity. No epistaxis. Right sinus exhibits no maxillary sinus tenderness and no frontal sinus tenderness. Left sinus exhibits no maxillary sinus tenderness and no frontal sinus tenderness.   Mouth/Throat: Uvula is midline, oropharynx is clear and moist and mucous membranes are normal. No trismus in the jaw. Normal dentition. No uvula swelling. No oropharyngeal exudate, posterior oropharyngeal edema or posterior oropharyngeal erythema.   Eyes: Conjunctivae and lids are normal. No scleral icterus.   Neck: Trachea normal and phonation normal. Neck supple. No edema present. No erythema present. No neck rigidity present.   Cardiovascular: Normal rate, regular rhythm, normal heart sounds and normal pulses.   Pulmonary/Chest: Effort normal and breath sounds normal. No stridor. No respiratory distress. He has no decreased breath sounds. He has no wheezes. He has no rhonchi. He has no rales.   Abdominal: Normal appearance.   Musculoskeletal: Normal range of motion.         General: No deformity. Normal range of motion.   Lymphadenopathy:     He has no cervical adenopathy.    Neurological: He is alert and oriented to person, place, and time. He exhibits normal muscle tone. Coordination normal.   Skin: Skin is warm, dry, intact, not diaphoretic and not pale.   Psychiatric: His speech is normal and behavior is normal. Judgment and thought content normal.   Nursing note and vitals reviewed.      Assessment:     1. Viral syndrome    2. Body aches        Plan:     Results for orders placed or performed in visit on 10/17/23   SARS Coronavirus 2 Antigen, POCT Manual Read   Result Value Ref Range    SARS Coronavirus 2 Antigen Negative Negative     Acceptable Yes        Viral syndrome    Body aches  -     SARS Coronavirus 2 Antigen, POCT Manual Read            Discussed results/diagnosis/plan with patient in clinic. Strict precautions given to patient to monitor for worsening signs and symptoms. Advised to follow up with PCP or specialist.  Explained side effects of medications prescribed with patient and informed him/her to discontinue use if he/she has any side effects and to inform UC or PCP if this occurs. All questions answered. Strict ED verses clinic return precautions stressed and given in depth. Advised if symptoms worsens of fail to improve he/she should go to the Emergency Room. Discharge and follow-up instructions given verbally/printed with the patient who expressed understanding and willingness to comply with my recommendations. Patient voiced understanding and in agreement with current treatment plan. Patient exits the exam room in no acute distress. Conversant and engaged during discharge discussion, verbalized understanding.

## 2023-10-27 ENCOUNTER — LAB VISIT (OUTPATIENT)
Dept: LAB | Facility: HOSPITAL | Age: 53
End: 2023-10-27
Attending: FAMILY MEDICINE
Payer: COMMERCIAL

## 2023-10-27 DIAGNOSIS — E11.9 TYPE 2 DIABETES MELLITUS WITHOUT COMPLICATION, WITHOUT LONG-TERM CURRENT USE OF INSULIN: ICD-10-CM

## 2023-10-27 DIAGNOSIS — E78.00 HYPERCHOLESTEROLEMIA: ICD-10-CM

## 2023-10-27 LAB
ALBUMIN SERPL BCP-MCNC: 3.9 G/DL (ref 3.5–5.2)
ALP SERPL-CCNC: 175 U/L (ref 55–135)
ALT SERPL W/O P-5'-P-CCNC: 66 U/L (ref 10–44)
ANION GAP SERPL CALC-SCNC: 10 MMOL/L (ref 8–16)
AST SERPL-CCNC: 30 U/L (ref 10–40)
BILIRUB SERPL-MCNC: 0.8 MG/DL (ref 0.1–1)
BUN SERPL-MCNC: 23 MG/DL (ref 6–20)
CALCIUM SERPL-MCNC: 10.1 MG/DL (ref 8.7–10.5)
CHLORIDE SERPL-SCNC: 109 MMOL/L (ref 95–110)
CHOLEST SERPL-MCNC: 156 MG/DL (ref 120–199)
CHOLEST/HDLC SERPL: 5.2 {RATIO} (ref 2–5)
CO2 SERPL-SCNC: 22 MMOL/L (ref 23–29)
CREAT SERPL-MCNC: 1.1 MG/DL (ref 0.5–1.4)
EST. GFR  (NO RACE VARIABLE): >60 ML/MIN/1.73 M^2
ESTIMATED AVG GLUCOSE: 192 MG/DL (ref 68–131)
GLUCOSE SERPL-MCNC: 181 MG/DL (ref 70–110)
HBA1C MFR BLD: 8.3 % (ref 4–5.6)
HDLC SERPL-MCNC: 30 MG/DL (ref 40–75)
HDLC SERPL: 19.2 % (ref 20–50)
LDLC SERPL CALC-MCNC: 103.8 MG/DL (ref 63–159)
NONHDLC SERPL-MCNC: 126 MG/DL
POTASSIUM SERPL-SCNC: 5.4 MMOL/L (ref 3.5–5.1)
PROT SERPL-MCNC: 7.3 G/DL (ref 6–8.4)
SODIUM SERPL-SCNC: 141 MMOL/L (ref 136–145)
TRIGL SERPL-MCNC: 111 MG/DL (ref 30–150)

## 2023-10-27 PROCEDURE — 83036 HEMOGLOBIN GLYCOSYLATED A1C: CPT | Performed by: FAMILY MEDICINE

## 2023-10-27 PROCEDURE — 80061 LIPID PANEL: CPT | Performed by: INTERNAL MEDICINE

## 2023-10-27 PROCEDURE — 80053 COMPREHEN METABOLIC PANEL: CPT | Performed by: INTERNAL MEDICINE

## 2023-10-27 PROCEDURE — 36415 COLL VENOUS BLD VENIPUNCTURE: CPT | Mod: PO | Performed by: INTERNAL MEDICINE

## 2023-11-01 ENCOUNTER — PATIENT MESSAGE (OUTPATIENT)
Dept: INTERNAL MEDICINE | Facility: CLINIC | Age: 53
End: 2023-11-01
Payer: COMMERCIAL

## 2023-11-06 ENCOUNTER — PATIENT OUTREACH (OUTPATIENT)
Dept: ADMINISTRATIVE | Facility: HOSPITAL | Age: 53
End: 2023-11-06
Payer: COMMERCIAL

## 2023-11-06 ENCOUNTER — PATIENT MESSAGE (OUTPATIENT)
Dept: ADMINISTRATIVE | Facility: HOSPITAL | Age: 53
End: 2023-11-06
Payer: COMMERCIAL

## 2023-11-06 DIAGNOSIS — E11.9 TYPE 2 DIABETES MELLITUS WITHOUT COMPLICATION, UNSPECIFIED WHETHER LONG TERM INSULIN USE: Primary | ICD-10-CM

## 2023-12-18 DIAGNOSIS — E78.01 HYPERLIPIDEMIA TYPE II: ICD-10-CM

## 2023-12-18 RX ORDER — ROSUVASTATIN CALCIUM 10 MG/1
10 TABLET, COATED ORAL DAILY
Qty: 90 TABLET | Refills: 1 | Status: SHIPPED | OUTPATIENT
Start: 2023-12-18

## 2023-12-18 NOTE — TELEPHONE ENCOUNTER
Refill Encounter    PCP Visits: Recent Visits  Date Type Provider Dept   04/27/23 Office Visit Kam Phipps MD [facility] Internal Medicine   Showing recent visits within past 360 days and meeting all other requirements  Future Appointments  No visits were found meeting these conditions.  Showing future appointments within next 720 days and meeting all other requirements     Last 3 Blood Pressure:   BP Readings from Last 3 Encounters:   10/17/23 (!) 129/92   04/27/23 120/80   01/02/23 120/85     Preferred Pharmacy:   [id number] [address] AT [facility] [address]  [address] [facility] [address]  Phone: [phone] Fax: [phone]    Requested RX:  Requested Prescriptions     Pending Prescriptions Disp Refills    rosuvastatin (CRESTOR) 10 MG tablet 90 tablet 0     Sig: Take 1 tablet (10 mg total) by mouth once daily.      RX Route: Normal

## 2023-12-19 NOTE — TELEPHONE ENCOUNTER
Refill Decision Note   Osmel Nair  is requesting a refill authorization.  Brief Assessment and Rationale for Refill:  Approve     Medication Therapy Plan:         Comments:     Note composed:7:22 PM 12/18/2023

## 2024-01-10 ENCOUNTER — IMMUNIZATION (OUTPATIENT)
Dept: INTERNAL MEDICINE | Facility: CLINIC | Age: 54
End: 2024-01-10
Payer: COMMERCIAL

## 2024-01-10 DIAGNOSIS — Z23 NEED FOR VACCINATION: Primary | ICD-10-CM

## 2024-01-10 PROCEDURE — 91322 SARSCOV2 VAC 50 MCG/0.5ML IM: CPT | Mod: S$GLB,,, | Performed by: INTERNAL MEDICINE

## 2024-01-10 PROCEDURE — 90480 ADMN SARSCOV2 VAC 1/ONLY CMP: CPT | Mod: S$GLB,,, | Performed by: INTERNAL MEDICINE

## 2024-01-16 ENCOUNTER — PATIENT MESSAGE (OUTPATIENT)
Dept: INTERNAL MEDICINE | Facility: CLINIC | Age: 54
End: 2024-01-16
Payer: COMMERCIAL

## 2024-01-17 ENCOUNTER — LAB VISIT (OUTPATIENT)
Dept: LAB | Facility: HOSPITAL | Age: 54
End: 2024-01-17
Attending: FAMILY MEDICINE
Payer: COMMERCIAL

## 2024-01-17 DIAGNOSIS — E11.9 TYPE 2 DIABETES MELLITUS WITHOUT COMPLICATION, UNSPECIFIED WHETHER LONG TERM INSULIN USE: ICD-10-CM

## 2024-01-17 LAB
ESTIMATED AVG GLUCOSE: 160 MG/DL (ref 68–131)
HBA1C MFR BLD: 7.2 % (ref 4–5.6)

## 2024-01-17 PROCEDURE — 36415 COLL VENOUS BLD VENIPUNCTURE: CPT | Mod: PO | Performed by: FAMILY MEDICINE

## 2024-01-17 PROCEDURE — 83036 HEMOGLOBIN GLYCOSYLATED A1C: CPT | Performed by: FAMILY MEDICINE

## 2024-01-26 LAB
LEFT EYE DM RETINOPATHY: POSITIVE
RIGHT EYE DM RETINOPATHY: POSITIVE

## 2024-01-31 ENCOUNTER — PATIENT OUTREACH (OUTPATIENT)
Dept: ADMINISTRATIVE | Facility: HOSPITAL | Age: 54
End: 2024-01-31
Payer: COMMERCIAL

## 2024-02-08 ENCOUNTER — OFFICE VISIT (OUTPATIENT)
Dept: PODIATRY | Facility: CLINIC | Age: 54
End: 2024-02-08
Payer: COMMERCIAL

## 2024-02-08 VITALS
HEART RATE: 82 BPM | RESPIRATION RATE: 18 BRPM | SYSTOLIC BLOOD PRESSURE: 126 MMHG | HEIGHT: 70 IN | DIASTOLIC BLOOD PRESSURE: 78 MMHG | BODY MASS INDEX: 29.03 KG/M2 | WEIGHT: 202.81 LBS

## 2024-02-08 DIAGNOSIS — E11.9 TYPE 2 DIABETES MELLITUS WITHOUT COMPLICATION, WITHOUT LONG-TERM CURRENT USE OF INSULIN: Primary | ICD-10-CM

## 2024-02-08 PROCEDURE — 3078F DIAST BP <80 MM HG: CPT | Mod: CPTII,S$GLB,, | Performed by: PODIATRIST

## 2024-02-08 PROCEDURE — 99999 PR PBB SHADOW E&M-EST. PATIENT-LVL III: CPT | Mod: PBBFAC,,, | Performed by: PODIATRIST

## 2024-02-08 PROCEDURE — 99213 OFFICE O/P EST LOW 20 MIN: CPT | Mod: S$GLB,,, | Performed by: PODIATRIST

## 2024-02-08 PROCEDURE — 3061F NEG MICROALBUMINURIA REV: CPT | Mod: CPTII,S$GLB,, | Performed by: PODIATRIST

## 2024-02-08 PROCEDURE — 1160F RVW MEDS BY RX/DR IN RCRD: CPT | Mod: CPTII,S$GLB,, | Performed by: PODIATRIST

## 2024-02-08 PROCEDURE — 3066F NEPHROPATHY DOC TX: CPT | Mod: CPTII,S$GLB,, | Performed by: PODIATRIST

## 2024-02-08 PROCEDURE — 3008F BODY MASS INDEX DOCD: CPT | Mod: CPTII,S$GLB,, | Performed by: PODIATRIST

## 2024-02-08 PROCEDURE — 3074F SYST BP LT 130 MM HG: CPT | Mod: CPTII,S$GLB,, | Performed by: PODIATRIST

## 2024-02-08 PROCEDURE — 1159F MED LIST DOCD IN RCRD: CPT | Mod: CPTII,S$GLB,, | Performed by: PODIATRIST

## 2024-02-08 PROCEDURE — 3051F HG A1C>EQUAL 7.0%<8.0%: CPT | Mod: CPTII,S$GLB,, | Performed by: PODIATRIST

## 2024-02-21 DIAGNOSIS — E11.9 TYPE 2 DIABETES MELLITUS WITHOUT COMPLICATION, WITHOUT LONG-TERM CURRENT USE OF INSULIN: ICD-10-CM

## 2024-02-21 DIAGNOSIS — E78.01 HYPERLIPIDEMIA TYPE II: ICD-10-CM

## 2024-02-21 DIAGNOSIS — Z00.00 PREVENTATIVE HEALTH CARE: ICD-10-CM

## 2024-02-22 RX ORDER — METFORMIN HYDROCHLORIDE 500 MG/1
500 TABLET ORAL 2 TIMES DAILY WITH MEALS
Qty: 180 TABLET | Refills: 0 | Status: SHIPPED | OUTPATIENT
Start: 2024-02-22 | End: 2024-05-16 | Stop reason: SDUPTHER

## 2024-02-22 NOTE — TELEPHONE ENCOUNTER
Refill Decision Note   Osmel Nair  is requesting a refill authorization.  Brief Assessment and Rationale for Refill:  Approve     Medication Therapy Plan:  A1c is current but LOV is >10mths ago      Comments:     Note composed:9:58 AM 02/22/2024

## 2024-02-22 NOTE — TELEPHONE ENCOUNTER
No care due was identified.  [facility] Embedded Care Due Messages. Reference number: [id number].   2/21/2024 6:37:57 PM CST

## 2024-04-09 NOTE — PROGRESS NOTES
COLONOSCOPY      DIET:  Resume your usual diet.      ACTIVITY:  Rest quietly at home for the remainder of the day. You may resume normal activities tomorrow.  Do not do anything that requires coordination the day of the procedure, such as climbing ladders, using a sharp knife or operating machinery.   Do not drive until tomorrow morning.                       Resume normal medications.       WHAT TO EXPECT:  Mild abdominal discomfort, bloating and gas pains.     A scant amount of rectal bleeding following a biopsy, polypectomy or if you have hemorrhoids, is normal.    Return of your usual bowel movements in 1-2 days.    If  you had a polyp removed or biopsy performed, these specimens will be sent to the pathology laboratory.        Patient/Family given For your Well Being Teaching Sheet:  _x_ Care After Anesthesia/ Sedation  _x_ Pain Management Tips  ___ About Surgical Site infection  ___ Smoking and second hand Smoke information  ___ Other:       PROBLEMS TO WATCH FOR--NOTIFY YOUR SURGEON IF YOU HAVE ANY OF THESE SYMPTOMS:    Severe abdominal pain or bloating.     Chills or temperature over 101 degrees.    Large amount of bright red rectal bleeding, blood clots or black colored stool.    Vomiting blood or black colored liquid.           If you have any problems or questions concerning your surgical procedure, please do not hesitate to call your doctor's office or the day surgery dept [phone] (mon - fri)      Recommendations  Repeat colonoscopy in 5 years for history for polyps.   High fiber diet.            Want to Say “Thank You” to a Nurse?  The JON Award® was created in memory of JOSUE Contreras by his family to say thank you to bedside nurses who provide an outstanding level of care.  Submit a nomination using any method below.     OR    [email or web address]      Individual Follow-Up Form    8/4/2022    Quit Date:     Clinical Status of Patient: Outpatient    Continuing Medication: yes  Patches    Other Medications: 2 mg nicotine gum PRN     Target Symptoms: Withdrawal and medication side effects. The following were  rated moderate (3) to severe (4) on TCRS:  · Moderate (3): none  · Severe (4): none    Comments: Patient seen in clinic today, he states smoking about the same 1-2 cpd. The patient remains on the prescribed tobacco cessation medication regimen of 14 mg patch and 2 mg gum PRN(in place of a cigarette) without any negative side effects at this time. No refills needed at this time. The patient denies any abnormal behavioral or mental changes at this time. Patient states he has set a date to try a dry run of 8/31. Discussed and reviewed strategies, cues, and triggers. The patient will continue with  therapy sessions and medication monitoring by CTTS. Prescribed medication management will be by physician.        Diagnosis: F17.200    Next Visit: 9/1/2022     NP- Deb Martin

## 2024-04-24 ENCOUNTER — OFFICE VISIT (OUTPATIENT)
Dept: DERMATOLOGY | Facility: CLINIC | Age: 54
End: 2024-04-24
Payer: COMMERCIAL

## 2024-04-24 DIAGNOSIS — D18.01 CHERRY ANGIOMA: ICD-10-CM

## 2024-04-24 DIAGNOSIS — L57.0 ACTINIC KERATOSIS: Primary | ICD-10-CM

## 2024-04-24 DIAGNOSIS — Z12.83 SCREENING EXAM FOR SKIN CANCER: ICD-10-CM

## 2024-04-24 DIAGNOSIS — L81.4 LENTIGO: ICD-10-CM

## 2024-04-24 DIAGNOSIS — D22.9 MULTIPLE BENIGN NEVI: ICD-10-CM

## 2024-04-24 DIAGNOSIS — L82.1 SEBORRHEIC KERATOSES: ICD-10-CM

## 2024-04-24 PROCEDURE — 1159F MED LIST DOCD IN RCRD: CPT | Mod: CPTII,S$GLB,, | Performed by: STUDENT IN AN ORGANIZED HEALTH CARE EDUCATION/TRAINING PROGRAM

## 2024-04-24 PROCEDURE — 3051F HG A1C>EQUAL 7.0%<8.0%: CPT | Mod: CPTII,S$GLB,, | Performed by: STUDENT IN AN ORGANIZED HEALTH CARE EDUCATION/TRAINING PROGRAM

## 2024-04-24 PROCEDURE — 17003 DESTRUCT PREMALG LES 2-14: CPT | Mod: S$GLB,,, | Performed by: STUDENT IN AN ORGANIZED HEALTH CARE EDUCATION/TRAINING PROGRAM

## 2024-04-24 PROCEDURE — 1160F RVW MEDS BY RX/DR IN RCRD: CPT | Mod: CPTII,S$GLB,, | Performed by: STUDENT IN AN ORGANIZED HEALTH CARE EDUCATION/TRAINING PROGRAM

## 2024-04-24 PROCEDURE — 17000 DESTRUCT PREMALG LESION: CPT | Mod: S$GLB,,, | Performed by: STUDENT IN AN ORGANIZED HEALTH CARE EDUCATION/TRAINING PROGRAM

## 2024-04-24 PROCEDURE — 3066F NEPHROPATHY DOC TX: CPT | Mod: CPTII,S$GLB,, | Performed by: STUDENT IN AN ORGANIZED HEALTH CARE EDUCATION/TRAINING PROGRAM

## 2024-04-24 PROCEDURE — 99203 OFFICE O/P NEW LOW 30 MIN: CPT | Mod: 25,S$GLB,, | Performed by: STUDENT IN AN ORGANIZED HEALTH CARE EDUCATION/TRAINING PROGRAM

## 2024-04-24 PROCEDURE — 3061F NEG MICROALBUMINURIA REV: CPT | Mod: CPTII,S$GLB,, | Performed by: STUDENT IN AN ORGANIZED HEALTH CARE EDUCATION/TRAINING PROGRAM

## 2024-04-24 PROCEDURE — 99999 PR PBB SHADOW E&M-EST. PATIENT-LVL III: CPT | Mod: PBBFAC,,, | Performed by: STUDENT IN AN ORGANIZED HEALTH CARE EDUCATION/TRAINING PROGRAM

## 2024-04-24 NOTE — PATIENT INSTRUCTIONS
CRYOSURGERY      Your doctor has used a method called cryosurgery to treat your skin condition. Cryosurgery refers to the use of very cold substances to treat a variety of skin conditions such as warts, pre-skin cancers, molluscum contagiosum, sun spots, and several benign growths. The substance we use in cryosurgery is liquid nitrogen and is so cold (-195 degrees Celsius) that is burns when administered.     Following treatment in the office, the skin may immediately burn and become red. You may find the area around the lesion is affected as well. It is sometimes necessary to treat not only the lesion, but a small area of the surrounding normal skin to achieve a good response.     A blister, and even a blood filled blister, may form after treatment.   This is a normal response. If the blister is painful, it is acceptable to sterilize a needle and with rubbing alcohol and gently pop the blister. It is important that you gently wash the area with soap and warm water as the blister fluid may contain wart virus if a wart was treated. Do no remove the roof of the blister.     The area treated can take anywhere from 1-3 weeks to heal. Healing time depends on the kind skin lesion treated, the location, and how aggressively the lesion was treated. It is recommended that the areas treated are covered with Vaseline or bacitracin ointment and a band-aid. If a band-aid is not practical, just ointment applied several times per day will do. Keeping these areas moist will speed the healing time.    Treatment with liquid nitrogen can leave a scar. In dark skin, it may be a light or dark scar, in light skin it may be a white or pink scar. These will generally fade with time, but may never go away completely.     If you have any concerns after your treatment, please feel free to call the office.       [address] [facility] [place] [address]/ [phone] FAX/ [email or web address]     Sunscreen Guidelines  Sunscreen  protects your skin by absorbing and reflecting ultraviolet rays. All sunscreens have a sun protection factor (SPF) rating that indicates how long a sunscreen will remain effective on the skin.    Why protect your skin?  The sun's rays are composed of many different wavelengths, including UVA, UVB, and visible light that each affect the skin differently.    UVB: sunburn, photoaging, skin cancer (melanoma, basal cell, and squamous cell carcinomas) and modulation of the skin's immune system.    UVA: similar to above but thought to contribute more to aging; at the same dose of UVB it is less powerful however the sun has 10-20 times the levels of UVA as compared with UVB.  Visible light: implicated in causing unwanted darkening of skin, such as melasma and post-inflammatory hyperpigmentation in darker skin types     If I have dark skin, do I need to worry about the sun?    More darkly pigmented skin is more protected against UV-induced skin cancer, sunburn, and photoaging, though may still suffer from sun-related conditions, including melasma, hyperpigmentation, and other dark spots.    Sun avoidance  As a general rule, stay out of the sun as much as possible between 10 a.m. - 4 p.m.    Download the EPA UV index marci to track the UV index by hour in your zip code.      Which sunscreen should I choose?  The best sunscreen to use varies by individual. The one that feels best on your skin and fits your lifestyle will be the one you will likely use most regularly.   Active ingredients of sunscreen vary by , and may be a chemical (such as avobenzone or oxybenzone) or physical agent (such as zinc oxide or titanium dioxide). I recommend a physical agent.  A water-resistant sunscreen is one that maintains the SPF level after 40 minutes of water exposure. A very water-resistant sunscreen maintains the SPF level after 80 minutes of water exposure.    Sunscreen: this is the last layer in sun protection   Be generous: 1  "shot glass of sunscreen for your body, ½ teaspoon for your face/neck  Reapply every 2 hours  Broad spectrum (provides UVA/UVB protection), SPF 50 or above  Avoid spray sunscreens: less effective and have been found to contain benzene (carcinogen)    Sun protective clothing  Although sunscreen helps minimize sun damage, no sunscreen completely blocks all wavelengths of UV light. Wearing sun protective clothing such as hats, rashguards or swim shirts, and long sleeves and/or pants, as well as avoiding sun exposure from 10 a.m. to 4 p.m. will help protect your skin from overexposure and minimize sun damage. Seek shade.  Long sleeved clothing, hats, and sunglasses: makes sun protection easier, more effective, and can even be more affordable, since sunscreen needs to be reapplied frequently.    Solumbra (www.sunprectautions.GoldenSUN)  Localize Direct (www.Sword.com)  JetSuiteibar (www.Horizon Discovery)  Land's end (www.Enpirion)  Hats from Liya Jeanette (www.helenkaminski.com)    My Favorite Sunscreens:  Physical blockers: Can have a "white case" but in general are more effective  - Face: CeraVe tinted mineral sunscreen, Bare Minerals complexion rescue (20 shades), Elta MD (UV elements, UV physical, UV restore, etc), Tizo ultra zinc tinted, Cetaphil Sheer Mineral Face Liquid Sunscreen  - Body: Blue Lizard, Neutrogena Sheer Zinc, Eucerin Daily Protection, Aveeno Baby   "

## 2024-04-24 NOTE — PROGRESS NOTES
Subjective:      Patient ID:  Osmel Nair is a 53 y.o. male who presents for   Chief Complaint   Patient presents with    Skin Check     Pt here for TBSE    Pt denies h/o skin cancer. Pt reports having cryosurgery to several spots on face and arms.     Patient with new complaint of lesion(s)  Location: red spot under L eye  Duration: 6 months  Symptoms: none  Relieving factors/Previous treatments: none          Review of Systems   Hematologic/Lymphatic: Does not bruise/bleed easily.       Objective:   Physical Exam   Constitutional: He appears well-developed and well-nourished. No distress.   Neurological: He is alert and oriented to person, place, and time. He is not disoriented.   Psychiatric: He has a normal mood and affect.   Skin:   Areas Examined (abnormalities noted in diagram):   Scalp / Hair Palpated and Inspected  Head / Face Inspection Performed  Neck Inspection Performed  Chest / Axilla Inspection Performed  Abdomen Inspection Performed  Back Inspection Performed  RUE Inspected  LUE Inspection Performed  Nails and Digits Inspection Performed                     Diagram Legend     Erythematous scaling macule/papule c/w actinic keratosis       Vascular papule c/w angioma      Pigmented verrucoid papule/plaque c/w seborrheic keratosis      Yellow umbilicated papule c/w sebaceous hyperplasia      Irregularly shaped tan macule c/w lentigo     1-2 mm smooth white papules consistent with Milia      Movable subcutaneous cyst with punctum c/w epidermal inclusion cyst      Subcutaneous movable cyst c/w pilar cyst      Firm pink to brown papule c/w dermatofibroma      Pedunculated fleshy papule(s) c/w skin tag(s)      Evenly pigmented macule c/w junctional nevus     Mildly variegated pigmented, slightly irregular-bordered macule c/w mildly atypical nevus      Flesh colored to evenly pigmented papule c/w intradermal nevus       Pink pearly papule/plaque c/w basal cell carcinoma      Erythematous  hyperkeratotic cursted plaque c/w SCC      Surgical scar with no sign of skin cancer recurrence      Open and closed comedones      Inflammatory papules and pustules      Verrucoid papule consistent consistent with wart     Erythematous eczematous patches and plaques     Dystrophic onycholytic nail with subungual debris c/w onychomycosis     Umbilicated papule    Erythematous-base heme-crusted tan verrucoid plaque consistent with inflamed seborrheic keratosis     Erythematous Silvery Scaling Plaque c/w Psoriasis     See annotation      Assessment / Plan:        Actinic keratosis  Cryosurgery Procedure Note    Verbal consent from the patient is obtained including, but not limited to, risk of hypopigmentation/hyperpigmentation, scar, recurrence of lesion. The patient is aware of the precancerous quality and need for treatment of these lesions. Liquid nitrogen cryosurgery is applied to the 2 actinic keratoses, as detailed in the physical exam, to produce a freeze injury. The patient is aware that blisters may form and is instructed on wound care with gentle cleansing and use of vaseline ointment to keep moist until healed. The patient is supplied a handout on cryosurgery and is instructed to call if lesions do not completely resolve.    Seborrheic keratoses  Cherry angioma  Multiple benign nevi  Lentigo  Reassurance given to patient. No treatment is necessary.   Treatment of benign, asymptomatic lesions may be considered cosmetic.    Screening exam for skin cancer  Upper body skin examination performed today including at least 6 points as noted in physical examination. No lesions suspicious for malignancy noted.    Recommend daily sun protection/avoidance and use of at least SPF 30, broad spectrum sunscreen (OTC drug).                Follow up in about 1 year (around 4/24/2025) for TBSE.

## 2024-04-29 NOTE — PROGRESS NOTES
Subjective:      Patient ID: Osmel Nair is a 53 y.o. male.    Chief Complaint: Diabetic Foot Exam (Yearly check up )    Osmel is a 53 y.o. male who presents to the clinic upon referral from Dr. Eli parsons. provider found  for evaluation and treatment of diabetic feet. Osmel has a past medical history of Diabetes mellitus, type 2 (07/2016) and Lyme disease (2016). Patient relates no major problem with feet. Only complaints today consist of yearly comprehensive diabetic  foot examinationPCP: Kam Phipps MD    Date Last Seen by PCP:   Chief Complaint   Patient presents with    Diabetic Foot Exam     Yearly check up          Current shoe gear:  Florencio     Hemoglobin A1C   Date Value Ref Range Status   01/17/2024 7.2 (H) 4.0 - 5.6 % Final     Comment:     ADA Screening Guidelines:  5.7-6.4%  Consistent with prediabetes  >or=6.5%  Consistent with diabetes    High levels of fetal hemoglobin interfere with the HbA1C  assay. Heterozygous hemoglobin variants (HbS, HgC, etc)do  not significantly interfere with this assay.   However, presence of multiple variants may affect accuracy.     10/27/2023 8.3 (H) 4.0 - 5.6 % Final     Comment:     ADA Screening Guidelines:  5.7-6.4%  Consistent with prediabetes  >or=6.5%  Consistent with diabetes    High levels of fetal hemoglobin interfere with the HbA1C  assay. Heterozygous hemoglobin variants (HbS, HgC, etc)do  not significantly interfere with this assay.   However, presence of multiple variants may affect accuracy.     04/27/2023 7.8 (H) 4.0 - 5.6 % Final     Comment:     ADA Screening Guidelines:  5.7-6.4%  Consistent with prediabetes  >or=6.5%  Consistent with diabetes    High levels of fetal hemoglobin interfere with the HbA1C  assay. Heterozygous hemoglobin variants (HbS, HgC, etc)do  not significantly interfere with this assay.   However, presence of multiple variants may affect accuracy.           Review of Systems   Constitutional: Negative for chills,  decreased appetite and fever.   Cardiovascular:  Negative for leg swelling.   Skin:  Negative for flushing.   Musculoskeletal:  Negative for arthritis, joint pain, joint swelling and myalgias.   Gastrointestinal:  Negative for nausea and vomiting.   Neurological:  Negative for loss of balance, numbness and paresthesias.   Psychiatric/Behavioral:  Negative for depression.            Objective:      Physical Exam  Vitals and nursing note reviewed.   Constitutional:       General: He is not in acute distress.     Appearance: He is well-developed. He is not toxic-appearing or diaphoretic.      Comments: alert and oriented x 3.    Cardiovascular:      Pulses:           Dorsalis pedis pulses are 2+ on the right side and 2+ on the left side.        Posterior tibial pulses are 2+ on the right side and 2+ on the left side.      Comments:  Capillary refill time is within normal limits. Digital hair present.   Pulmonary:      Effort: No respiratory distress.   Musculoskeletal:         General: No deformity or signs of injury.      Right ankle: No tenderness. No lateral malleolus, medial malleolus, AITF ligament, CF ligament or posterior TF ligament tenderness.      Right Achilles Tendon: No defects. Buckley's test negative.      Left ankle: No tenderness. No lateral malleolus, medial malleolus, AITF ligament, CF ligament or posterior TF ligament tenderness.      Left Achilles Tendon: No defects. Buckley's test negative.      Right foot: No tenderness or bony tenderness.      Left foot: No tenderness or bony tenderness.      Comments: Adequate joint range of motion without pain, limitation, nor crepitation Bilateral feet and ankle joints. Muscle strength is 5/5 in all groups bilaterally.           Feet:      Right foot:      Protective Sensation: 5 sites tested.  5 sites sensed.      Skin integrity: Skin integrity normal.      Left foot:      Protective Sensation: 5 sites tested.  5 sites sensed.      Skin integrity: Skin  integrity normal.   Lymphadenopathy:      Comments: No lymphatic streaking     Skin:     General: Skin is warm and dry.      Coloration: Skin is not pale.      Findings: No rash.      Nails: There is no clubbing.      Comments: Skin is of normal turgor.   Normal temperature gradient.  Examination of the skin reveals no evidence of significant rashes, open lesions, suspicious appearing nevi or other concerning lesions.        Toenails 1-5 bilaterally are neatly trimmed; of normal color and thickness   Neurological:      Sensory: No sensory deficit.      Motor: No atrophy.      Comments: Light touch present     Psychiatric:         Attention and Perception: He is attentive.         Mood and Affect: Mood is not anxious. Affect is not inappropriate.         Speech: He is communicative. Speech is not slurred.         Behavior: Behavior is not combative.               Assessment:       Encounter Diagnosis   Name Primary?    Type 2 diabetes mellitus without complication, without long-term current use of insulin Yes         Plan:       Osmel was seen today for diabetic foot exam.    Diagnoses and all orders for this visit:    Type 2 diabetes mellitus without complication, without long-term current use of insulin      I counseled the patient on his conditions, their implications and medical management.        - Shoe inspection. Diabetic Foot Education. Patient reminded of the importance of good nutrition and blood sugar control to help prevent podiatric complications of diabetes. Patient instructed on proper foot hygeine. We discussed wearing proper shoe gear, daily foot inspections, never walking without protective shoe gear, caution putting sharp instruments to feet     - Discussed DM foot care:  Wear comfortable, proper fitting shoes. Wash feet daily. Dry well. After drying, apply moisturizer to feet (no lotion to webspaces). Inspect feet daily for skin breaks, blisters, swelling, or redness. Wear cotton socks (preferably  white)  Change socks every day. Do NOT walk barefoot. Do NOT use heating pads or warm/hot water soaks     - Discussed importance of daily moisturizer to the feet such as Cerave,Sween, Or Cetaphil    - Patient is low risk for developing lower extremity issues secondary to diabetes.     - I recommend continued yearly diabetic foot examinations by Myself or PCP    - Currently  patient has NO pedal manifestations of DM    - Patients with out pedal manifestations of DM, do not qualify for Diabetic Shoes/Inserts nor  nail/callus trimming     - RTC in 1 yr or  PRN   .

## 2024-05-16 ENCOUNTER — OFFICE VISIT (OUTPATIENT)
Dept: INTERNAL MEDICINE | Facility: CLINIC | Age: 54
End: 2024-05-16
Attending: FAMILY MEDICINE
Payer: COMMERCIAL

## 2024-05-16 VITALS
WEIGHT: 204.13 LBS | HEIGHT: 70 IN | HEART RATE: 97 BPM | OXYGEN SATURATION: 95 % | SYSTOLIC BLOOD PRESSURE: 133 MMHG | DIASTOLIC BLOOD PRESSURE: 77 MMHG | BODY MASS INDEX: 29.22 KG/M2

## 2024-05-16 DIAGNOSIS — E11.9 TYPE 2 DIABETES MELLITUS WITHOUT COMPLICATION, WITHOUT LONG-TERM CURRENT USE OF INSULIN: ICD-10-CM

## 2024-05-16 DIAGNOSIS — Z00.00 PREVENTATIVE HEALTH CARE: Primary | ICD-10-CM

## 2024-05-16 DIAGNOSIS — E11.65 TYPE 2 DIABETES MELLITUS WITH HYPERGLYCEMIA, WITHOUT LONG-TERM CURRENT USE OF INSULIN: ICD-10-CM

## 2024-05-16 DIAGNOSIS — E78.01 HYPERLIPIDEMIA TYPE II: ICD-10-CM

## 2024-05-16 PROCEDURE — 3008F BODY MASS INDEX DOCD: CPT | Mod: CPTII,S$GLB,, | Performed by: FAMILY MEDICINE

## 2024-05-16 PROCEDURE — 3051F HG A1C>EQUAL 7.0%<8.0%: CPT | Mod: CPTII,S$GLB,, | Performed by: FAMILY MEDICINE

## 2024-05-16 PROCEDURE — 99999 PR PBB SHADOW E&M-EST. PATIENT-LVL III: CPT | Mod: PBBFAC,,, | Performed by: FAMILY MEDICINE

## 2024-05-16 PROCEDURE — 3061F NEG MICROALBUMINURIA REV: CPT | Mod: CPTII,S$GLB,, | Performed by: FAMILY MEDICINE

## 2024-05-16 PROCEDURE — 99396 PREV VISIT EST AGE 40-64: CPT | Mod: S$GLB,,, | Performed by: FAMILY MEDICINE

## 2024-05-16 PROCEDURE — 1159F MED LIST DOCD IN RCRD: CPT | Mod: CPTII,S$GLB,, | Performed by: FAMILY MEDICINE

## 2024-05-16 PROCEDURE — 3066F NEPHROPATHY DOC TX: CPT | Mod: CPTII,S$GLB,, | Performed by: FAMILY MEDICINE

## 2024-05-16 PROCEDURE — 3075F SYST BP GE 130 - 139MM HG: CPT | Mod: CPTII,S$GLB,, | Performed by: FAMILY MEDICINE

## 2024-05-16 PROCEDURE — 1160F RVW MEDS BY RX/DR IN RCRD: CPT | Mod: CPTII,S$GLB,, | Performed by: FAMILY MEDICINE

## 2024-05-16 PROCEDURE — 3078F DIAST BP <80 MM HG: CPT | Mod: CPTII,S$GLB,, | Performed by: FAMILY MEDICINE

## 2024-05-16 RX ORDER — BLOOD SUGAR DIAGNOSTIC
STRIP MISCELLANEOUS
Qty: 300 STRIP | Refills: 3 | Status: SHIPPED | OUTPATIENT
Start: 2024-05-16

## 2024-05-16 RX ORDER — ROSUVASTATIN CALCIUM 10 MG/1
10 TABLET, COATED ORAL DAILY
Qty: 90 TABLET | Refills: 3 | Status: SHIPPED | OUTPATIENT
Start: 2024-05-16

## 2024-05-16 RX ORDER — METFORMIN HYDROCHLORIDE 500 MG/1
500 TABLET ORAL 2 TIMES DAILY WITH MEALS
Qty: 180 TABLET | Refills: 3 | Status: SHIPPED | OUTPATIENT
Start: 2024-05-16

## 2024-05-16 NOTE — PROGRESS NOTES
[facility]       Subjective:   Chief Complaint: Annual, excessive snoring     History of Presenting Illness: Patient is a pleasant 53 y.o. male with history of T2DM who presents to clinic for his annual visit. Patient reports he was told he snores excessively throughout the night with episodes of stopping breathing. Patient also notes some daytime fatigue. He has not been dx with DAVID and inquires what further workup he could undergo to allow him to get a more refreshing sleep.  Patient has T2DM on metformin 500mg BID. He reports he takes his medication regularly as prescribed and checks his preprandial blood sugar every morning - ave 113 to 130. His most recent HbA1c was 7.5.      Patient denies fever/chills, nausea/vomiting, headache, falls, changes in vision, new weakness/numbness/tingling, SOB, CP, rash, bone or joints pain, hematuria or hematochezia.          Past Medical History:   Diagnosis Date    Diabetes mellitus, type 2 07/2016    Lyme disease 2016            Past Surgical History:   Procedure Laterality Date    EPIDURAL STEROID INJECTION N/A 8/30/2019     Procedure: INJECTION, STEROID, EPIDURAL, CERVICAL C7-T1 INTERLAMINAR need consent;  Surgeon: Uzma Ulloa MD;  Location: [facility];  Service: Pain Management;  Laterality: N/A;    EPIDURAL STEROID INJECTION N/A 1/10/2022     Procedure: Injection, Steroid, Epidural C7/T1;  Surgeon: Otis Galindo MD;  Location: [facility];  Service: Pain Management;  Laterality: N/A;    KNEE ARTHROSCOPY Right 1989      Medications Ordered Prior to Encounter     Review of Systems   Constitutional:  Positive for malaise/fatigue. Negative for chills, diaphoresis, fever and weight loss.   HENT:  Negative for congestion, nosebleeds and sore throat.    Eyes:  Negative for blurred vision and double vision.   Respiratory:  Negative for cough, hemoptysis, shortness of breath and wheezing.    Cardiovascular:  Negative for chest pain and palpitations.  "  Gastrointestinal:  Negative for abdominal pain, constipation, diarrhea, nausea and vomiting.   Genitourinary:  Negative for dysuria, flank pain, hematuria and urgency.   Musculoskeletal:  Negative for falls, joint pain and myalgias.   Skin:  Negative for rash.   Neurological:  Negative for dizziness, tingling, tremors, weakness and headaches.   Endo/Heme/Allergies:  Does not bruise/bleed easily.   Psychiatric/Behavioral:  Negative for depression.       Objective:   Blood pressure 133/77, pulse 97, height 5' 10" (1.778 m), weight 92.6 kg (204 lb 2.3 oz), SpO2 95%.     Physical Exam:  General: Well/ill appearing, Not in distress  Head: Non-traumatic, normocephalic  Neck: Supple, no tenderness to palpation, no lymphadenopathy  CVS: Dual heart sounds, no additional sounds, regular rate and rhythm, distal pulses equal and strong  Pulm: Symmetric expansion, no wheeze, rhonchi, rales  GI: Abdomen nondistended, soft, nontender  MSK: Moves all extremities without restriction, atraumatic  Skin: Dry, intact  Psych: Normal thought content and cognition        Assessment and Plan:   Patient is a 53 year old male with history of T2DM     Problem List       Patient Active Problem List   Diagnosis    Left cervical radiculopathy    Myalgia and myositis    Type 2 diabetes mellitus without complication, without long-term current use of insulin    Hyperlipidemia type II    Cervical radicular pain    Cervical spondylosis    Cervical radiculopathy    Familial hypercholesterolemia    Muscle pain    Type 2 diabetes mellitus without complication    DDD (degenerative disc disease), cervical            Referral to sleep medicine for a sleep study  2.  HbA1c looks good        "

## 2024-08-27 ENCOUNTER — TELEPHONE (OUTPATIENT)
Dept: INTERNAL MEDICINE | Facility: CLINIC | Age: 54
End: 2024-08-27
Payer: COMMERCIAL

## 2024-08-27 DIAGNOSIS — E78.01 HYPERLIPIDEMIA TYPE II: Primary | ICD-10-CM

## 2024-08-27 DIAGNOSIS — E11.9 TYPE 2 DIABETES MELLITUS WITHOUT COMPLICATION, WITHOUT LONG-TERM CURRENT USE OF INSULIN: ICD-10-CM

## 2024-08-28 ENCOUNTER — LAB VISIT (OUTPATIENT)
Dept: LAB | Facility: HOSPITAL | Age: 54
End: 2024-08-28
Attending: FAMILY MEDICINE
Payer: COMMERCIAL

## 2024-08-28 DIAGNOSIS — E11.9 TYPE 2 DIABETES MELLITUS WITHOUT COMPLICATION, WITHOUT LONG-TERM CURRENT USE OF INSULIN: ICD-10-CM

## 2024-08-28 LAB
ESTIMATED AVG GLUCOSE: 200 MG/DL (ref 68–131)
HBA1C MFR BLD: 8.6 % (ref 4–5.6)

## 2024-08-28 PROCEDURE — 36415 COLL VENOUS BLD VENIPUNCTURE: CPT | Mod: PO | Performed by: FAMILY MEDICINE

## 2024-08-28 PROCEDURE — 83036 HEMOGLOBIN GLYCOSYLATED A1C: CPT | Performed by: FAMILY MEDICINE

## 2024-09-07 ENCOUNTER — PATIENT MESSAGE (OUTPATIENT)
Dept: INTERNAL MEDICINE | Facility: CLINIC | Age: 54
End: 2024-09-07
Payer: COMMERCIAL

## 2024-09-07 DIAGNOSIS — E11.9 TYPE 2 DIABETES MELLITUS WITHOUT COMPLICATION, WITHOUT LONG-TERM CURRENT USE OF INSULIN: Primary | ICD-10-CM

## 2024-09-09 ENCOUNTER — TELEPHONE (OUTPATIENT)
Dept: INTERNAL MEDICINE | Facility: CLINIC | Age: 54
End: 2024-09-09

## 2024-09-09 NOTE — TELEPHONE ENCOUNTER
No care due was identified.  [facility] Embedded Care Due Messages. Reference number: [id number].   9/09/2024 7:40:37 AM CDT

## 2024-09-10 ENCOUNTER — OFFICE VISIT (OUTPATIENT)
Dept: INTERNAL MEDICINE | Facility: CLINIC | Age: 54
End: 2024-09-10
Attending: FAMILY MEDICINE
Payer: COMMERCIAL

## 2024-09-10 DIAGNOSIS — E78.01 HYPERLIPIDEMIA TYPE II: ICD-10-CM

## 2024-09-10 DIAGNOSIS — Z00.00 PREVENTATIVE HEALTH CARE: ICD-10-CM

## 2024-09-10 DIAGNOSIS — E11.9 TYPE 2 DIABETES MELLITUS WITHOUT COMPLICATION, WITHOUT LONG-TERM CURRENT USE OF INSULIN: Primary | ICD-10-CM

## 2024-09-10 PROCEDURE — 1160F RVW MEDS BY RX/DR IN RCRD: CPT | Mod: CPTII,95,, | Performed by: FAMILY MEDICINE

## 2024-09-10 PROCEDURE — 3061F NEG MICROALBUMINURIA REV: CPT | Mod: CPTII,95,, | Performed by: FAMILY MEDICINE

## 2024-09-10 PROCEDURE — 3052F HG A1C>EQUAL 8.0%<EQUAL 9.0%: CPT | Mod: CPTII,95,, | Performed by: FAMILY MEDICINE

## 2024-09-10 PROCEDURE — 1159F MED LIST DOCD IN RCRD: CPT | Mod: CPTII,95,, | Performed by: FAMILY MEDICINE

## 2024-09-10 PROCEDURE — 99214 OFFICE O/P EST MOD 30 MIN: CPT | Mod: 95,,, | Performed by: FAMILY MEDICINE

## 2024-09-10 PROCEDURE — 3066F NEPHROPATHY DOC TX: CPT | Mod: CPTII,95,, | Performed by: FAMILY MEDICINE

## 2024-09-10 RX ORDER — METFORMIN HYDROCHLORIDE 500 MG/1
1000 TABLET ORAL 2 TIMES DAILY WITH MEALS
Qty: 360 TABLET | Refills: 3 | Status: SHIPPED | OUTPATIENT
Start: 2024-09-10
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
## 2024-10-01 ENCOUNTER — PATIENT OUTREACH (OUTPATIENT)
Dept: ADMINISTRATIVE | Facility: HOSPITAL | Age: 54
End: 2024-10-01
Payer: COMMERCIAL

## 2024-11-05 ENCOUNTER — IMMUNIZATION (OUTPATIENT)
Dept: INTERNAL MEDICINE | Facility: CLINIC | Age: 54
End: 2024-11-05
Payer: COMMERCIAL

## 2024-11-05 DIAGNOSIS — Z23 NEED FOR VACCINATION: Primary | ICD-10-CM

## 2024-11-05 PROCEDURE — 90656 IIV3 VACC NO PRSV 0.5 ML IM: CPT | Mod: S$GLB,,, | Performed by: INTERNAL MEDICINE

## 2024-11-05 PROCEDURE — 90471 IMMUNIZATION ADMIN: CPT | Mod: S$GLB,,, | Performed by: INTERNAL MEDICINE

## 2024-11-08 ENCOUNTER — IMMUNIZATION (OUTPATIENT)
Dept: INTERNAL MEDICINE | Facility: CLINIC | Age: 54
End: 2024-11-08
Payer: COMMERCIAL

## 2024-11-08 DIAGNOSIS — Z23 NEED FOR VACCINATION: Primary | ICD-10-CM

## 2024-11-12 DIAGNOSIS — E11.9 TYPE 2 DIABETES MELLITUS WITHOUT COMPLICATION, WITHOUT LONG-TERM CURRENT USE OF INSULIN: ICD-10-CM

## 2024-11-12 NOTE — TELEPHONE ENCOUNTER
Care Due:                  Date            Visit Type   Department     Provider  --------------------------------------------------------------------------------                                ESTABLISHED                              PATIENT -    [facility] INTERNAL  Kam Terry  Last Visit: 09-      [employer]      [facility]  Next Visit: None Scheduled  None         None Found                                                            Last  Test          Frequency    Reason                     Performed    Due Date  --------------------------------------------------------------------------------    CMP.........  12 months..  empagliflozin, metFORMIN,   10-   10-                             rosuvastatin.............    Lipid Panel.  12 months..  rosuvastatin.............  10-   10-    [employer] Embedded Care Due Messages. Reference number: [id number].   11/12/2024 1:56:36 PM CST

## 2024-11-15 ENCOUNTER — LAB VISIT (OUTPATIENT)
Dept: LAB | Facility: HOSPITAL | Age: 54
End: 2024-11-15
Attending: FAMILY MEDICINE
Payer: COMMERCIAL

## 2024-11-15 DIAGNOSIS — E11.9 TYPE 2 DIABETES MELLITUS WITHOUT COMPLICATION, WITHOUT LONG-TERM CURRENT USE OF INSULIN: ICD-10-CM

## 2024-11-15 LAB
ESTIMATED AVG GLUCOSE: 171 MG/DL (ref 68–131)
HBA1C MFR BLD: 7.6 % (ref 4–5.6)

## 2024-11-15 PROCEDURE — 83036 HEMOGLOBIN GLYCOSYLATED A1C: CPT | Performed by: FAMILY MEDICINE

## 2024-11-15 PROCEDURE — 36415 COLL VENOUS BLD VENIPUNCTURE: CPT | Mod: PO | Performed by: FAMILY MEDICINE

## 2024-11-18 ENCOUNTER — TELEPHONE (OUTPATIENT)
Dept: INTERNAL MEDICINE | Facility: CLINIC | Age: 54
End: 2024-11-18
Payer: COMMERCIAL

## 2024-11-18 DIAGNOSIS — E78.01 HYPERLIPIDEMIA TYPE II: Primary | ICD-10-CM

## 2024-11-18 NOTE — TELEPHONE ENCOUNTER
Appointment Request From: Osmel Nair    With Provider: Kam Phipps MD [[facility] - Internal Medicine]    Preferred Date Range: 11/20/2024 - 11/22/2024    Preferred Times: Any Time    Reason for visit: I would like to get an order in to do my Lipid test.    Comments:  Lipid test

## 2024-11-21 ENCOUNTER — LAB VISIT (OUTPATIENT)
Dept: LAB | Facility: HOSPITAL | Age: 54
End: 2024-11-21
Attending: FAMILY MEDICINE
Payer: COMMERCIAL

## 2024-11-21 DIAGNOSIS — E78.01 HYPERLIPIDEMIA TYPE II: ICD-10-CM

## 2024-11-21 LAB
CHOLEST SERPL-MCNC: 196 MG/DL (ref 120–199)
CHOLEST/HDLC SERPL: 4.5 {RATIO} (ref 2–5)
HDLC SERPL-MCNC: 44 MG/DL (ref 40–75)
HDLC SERPL: 22.4 % (ref 20–50)
LDLC SERPL CALC-MCNC: 124.4 MG/DL (ref 63–159)
NONHDLC SERPL-MCNC: 152 MG/DL
TRIGL SERPL-MCNC: 138 MG/DL (ref 30–150)

## 2024-11-21 PROCEDURE — 80061 LIPID PANEL: CPT | Performed by: FAMILY MEDICINE

## 2024-11-21 PROCEDURE — 36415 COLL VENOUS BLD VENIPUNCTURE: CPT | Mod: PO | Performed by: FAMILY MEDICINE

## 2025-01-16 ENCOUNTER — TELEPHONE (OUTPATIENT)
Dept: INTERNAL MEDICINE | Facility: CLINIC | Age: 55
End: 2025-01-16
Payer: COMMERCIAL

## 2025-01-16 DIAGNOSIS — E11.9 TYPE 2 DIABETES MELLITUS WITHOUT COMPLICATION, WITHOUT LONG-TERM CURRENT USE OF INSULIN: Primary | ICD-10-CM

## 2025-01-17 ENCOUNTER — PATIENT MESSAGE (OUTPATIENT)
Dept: INTERNAL MEDICINE | Facility: CLINIC | Age: 55
End: 2025-01-17
Payer: COMMERCIAL

## 2025-01-17 DIAGNOSIS — E11.65 TYPE 2 DIABETES MELLITUS WITH HYPERGLYCEMIA, WITHOUT LONG-TERM CURRENT USE OF INSULIN: Primary | ICD-10-CM

## 2025-01-22 DIAGNOSIS — E78.01 HYPERLIPIDEMIA TYPE II: ICD-10-CM

## 2025-01-22 DIAGNOSIS — Z00.00 PREVENTATIVE HEALTH CARE: ICD-10-CM

## 2025-01-22 DIAGNOSIS — E11.9 TYPE 2 DIABETES MELLITUS WITHOUT COMPLICATION, WITHOUT LONG-TERM CURRENT USE OF INSULIN: ICD-10-CM

## 2025-01-23 RX ORDER — METFORMIN HYDROCHLORIDE 500 MG/1
1000 TABLET ORAL 2 TIMES DAILY WITH MEALS
Qty: 360 TABLET | Refills: 0 | Status: SHIPPED | OUTPATIENT
Start: 2025-01-23

## 2025-01-23 NOTE — TELEPHONE ENCOUNTER
Medication Pending       Refill Encounter    PCP Visits: Recent Visits  Date Type Provider Dept   09/10/24 Office Visit Kam Phipps MD [facility] Internal Medicine   05/16/24 Office Visit Kam Phipps MD [facility] Internal Medicine   Showing recent visits within past 360 days and meeting all other requirements  Future Appointments  No visits were found meeting these conditions.  Showing future appointments within next 720 days and meeting all other requirements     Last 3 Blood Pressure:   BP Readings from Last 3 Encounters:   05/16/24 133/77   02/08/24 126/78   10/17/23 (!) 129/92     Preferred Pharmacy:   [id number] [place], [place] - [address] [place] [address] AT [facility]  [address] [facility] [address]  Phone: [phone] Fax: [phone]    Requested RX:  Requested Prescriptions     Pending Prescriptions Disp Refills    metFORMIN (GLUCOPHAGE) 500 MG tablet 360 tablet 3     Sig: Take 2 tablets (1,000 mg total) by mouth 2 (two) times daily with meals.    empagliflozin (JARDIANCE) 25 mg tablet 90 tablet 0     Sig: Take 1 tablet (25 mg total) by mouth once daily.      RX Route: Normal

## 2025-01-23 NOTE — TELEPHONE ENCOUNTER
Care Due:                  Date            Visit Type   Department     Provider  --------------------------------------------------------------------------------                                ESTABLISHED                              PATIENT -    [facility] INTERNAL  Kam Harrison  Last Visit: 09-      VIRTUAL      MEDICINE       [facility]  Next Visit: None Scheduled  None         None Found                                                            Last  Test          Frequency    Reason                     Performed    Due Date  --------------------------------------------------------------------------------    CMP.........  12 months..  empagliflozin, metFORMIN,   10-   10-                             rosuvastatin.............    [employer] Embedded Care Due Messages. Reference number: [id number].   1/22/2025 7:29:51 PM CST

## 2025-01-24 ENCOUNTER — OFFICE VISIT (OUTPATIENT)
Dept: PODIATRY | Facility: CLINIC | Age: 55
End: 2025-01-24
Payer: COMMERCIAL

## 2025-01-24 ENCOUNTER — LAB VISIT (OUTPATIENT)
Dept: LAB | Facility: HOSPITAL | Age: 55
End: 2025-01-24
Attending: FAMILY MEDICINE
Payer: COMMERCIAL

## 2025-01-24 VITALS
WEIGHT: 202.81 LBS | HEART RATE: 85 BPM | BODY MASS INDEX: 29.03 KG/M2 | HEIGHT: 70 IN | SYSTOLIC BLOOD PRESSURE: 116 MMHG | RESPIRATION RATE: 18 BRPM | DIASTOLIC BLOOD PRESSURE: 80 MMHG

## 2025-01-24 DIAGNOSIS — E11.65 TYPE 2 DIABETES MELLITUS WITH HYPERGLYCEMIA, WITHOUT LONG-TERM CURRENT USE OF INSULIN: ICD-10-CM

## 2025-01-24 DIAGNOSIS — E11.9 TYPE 2 DIABETES MELLITUS WITHOUT COMPLICATION, WITHOUT LONG-TERM CURRENT USE OF INSULIN: Primary | ICD-10-CM

## 2025-01-24 LAB
ALBUMIN/CREAT UR: 7.5 UG/MG (ref 0–30)
CREAT UR-MCNC: 107 MG/DL (ref 23–375)
HM FOOT EXAM: NORMAL
MICROALBUMIN UR DL<=1MG/L-MCNC: 8 UG/ML

## 2025-01-24 PROCEDURE — 1159F MED LIST DOCD IN RCRD: CPT | Mod: CPTII,S$GLB,, | Performed by: PODIATRIST

## 2025-01-24 PROCEDURE — 99213 OFFICE O/P EST LOW 20 MIN: CPT | Mod: S$GLB,,, | Performed by: PODIATRIST

## 2025-01-24 PROCEDURE — 99999 PR PBB SHADOW E&M-EST. PATIENT-LVL III: CPT | Mod: PBBFAC,,, | Performed by: PODIATRIST

## 2025-01-24 PROCEDURE — 3074F SYST BP LT 130 MM HG: CPT | Mod: CPTII,S$GLB,, | Performed by: PODIATRIST

## 2025-01-24 PROCEDURE — 3008F BODY MASS INDEX DOCD: CPT | Mod: CPTII,S$GLB,, | Performed by: PODIATRIST

## 2025-01-24 PROCEDURE — 82043 UR ALBUMIN QUANTITATIVE: CPT | Performed by: FAMILY MEDICINE

## 2025-01-24 PROCEDURE — 3079F DIAST BP 80-89 MM HG: CPT | Mod: CPTII,S$GLB,, | Performed by: PODIATRIST

## 2025-01-24 PROCEDURE — 3066F NEPHROPATHY DOC TX: CPT | Mod: CPTII,S$GLB,, | Performed by: PODIATRIST

## 2025-01-24 PROCEDURE — 3061F NEG MICROALBUMINURIA REV: CPT | Mod: CPTII,S$GLB,, | Performed by: PODIATRIST

## 2025-02-05 ENCOUNTER — PATIENT OUTREACH (OUTPATIENT)
Dept: ADMINISTRATIVE | Facility: HOSPITAL | Age: 55
End: 2025-02-05
Payer: COMMERCIAL

## 2025-02-05 ENCOUNTER — PATIENT MESSAGE (OUTPATIENT)
Dept: INTERNAL MEDICINE | Facility: CLINIC | Age: 55
End: 2025-02-05
Payer: COMMERCIAL

## 2025-02-06 ENCOUNTER — PATIENT OUTREACH (OUTPATIENT)
Dept: ADMINISTRATIVE | Facility: HOSPITAL | Age: 55
End: 2025-02-06
Payer: COMMERCIAL

## 2025-02-11 ENCOUNTER — PATIENT MESSAGE (OUTPATIENT)
Dept: INTERNAL MEDICINE | Facility: CLINIC | Age: 55
End: 2025-02-11
Payer: COMMERCIAL

## 2025-02-11 DIAGNOSIS — Z12.11 SCREEN FOR COLON CANCER: Primary | ICD-10-CM

## 2025-02-27 NOTE — PROGRESS NOTES
Subjective:      Patient ID: Osmel Nair is a 54 y.o. male.    Chief Complaint: Diabetic Foot Exam (Kam Phipps MD at 9/10/2024) and Foot Problem (Little spot on the Rt foot arch area )    Osmel is a 54 y.o. male who presents to the clinic upon referral from Dr. Eli parsons. provider found  for evaluation and treatment of diabetic feet. Osmel has a past medical history of Diabetes mellitus, type 2 (07/2016) and Lyme disease (2016). Patient relates no major problem with feet. Only complaints today consist of yearly comprehensive diabetic  foot examination      PCP: Kam Phipps MD    Date Last Seen by PCP:   Chief Complaint   Patient presents with    Diabetic Foot Exam     Kam Phipps MD at 9/10/2024    Foot Problem     Little spot on the Rt foot arch area          Current shoe gear: casual shoes    Hemoglobin A1C   Date Value Ref Range Status   11/15/2024 7.6 (H) 4.0 - 5.6 % Final     Comment:     ADA Screening Guidelines:  5.7-6.4%  Consistent with prediabetes  >or=6.5%  Consistent with diabetes    High levels of fetal hemoglobin interfere with the HbA1C  assay. Heterozygous hemoglobin variants (HbS, HgC, etc)do  not significantly interfere with this assay.   However, presence of multiple variants may affect accuracy.     08/28/2024 8.6 (H) 4.0 - 5.6 % Final     Comment:     ADA Screening Guidelines:  5.7-6.4%  Consistent with prediabetes  >or=6.5%  Consistent with diabetes    High levels of fetal hemoglobin interfere with the HbA1C  assay. Heterozygous hemoglobin variants (HbS, HgC, etc)do  not significantly interfere with this assay.   However, presence of multiple variants may affect accuracy.     01/17/2024 7.2 (H) 4.0 - 5.6 % Final     Comment:     ADA Screening Guidelines:  5.7-6.4%  Consistent with prediabetes  >or=6.5%  Consistent with diabetes    High levels of fetal hemoglobin interfere with the HbA1C  assay. Heterozygous hemoglobin variants (HbS, HgC,  etc)do  not significantly interfere with this assay.   However, presence of multiple variants may affect accuracy.           Review of Systems   Constitutional: Negative for chills, decreased appetite and fever.   Cardiovascular:  Negative for leg swelling.   Skin:  Negative for color change, dry skin and flushing.   Musculoskeletal:  Negative for arthritis, joint pain, joint swelling and myalgias.   Gastrointestinal:  Negative for nausea and vomiting.   Neurological:  Negative for loss of balance, numbness and paresthesias.   Psychiatric/Behavioral:  Negative for depression.            Objective:      Physical Exam  Vitals and nursing note reviewed.   Constitutional:       General: He is not in acute distress.     Appearance: He is well-developed. He is not toxic-appearing or diaphoretic.      Comments: alert and oriented x 3.    Cardiovascular:      Pulses:           Dorsalis pedis pulses are 2+ on the right side and 2+ on the left side.        Posterior tibial pulses are 2+ on the right side and 2+ on the left side.      Comments:  Capillary refill time is within normal limits. Digital hair present.   Pulmonary:      Effort: No respiratory distress.   Musculoskeletal:         General: No tenderness, deformity or signs of injury.      Right ankle: No tenderness. No lateral malleolus, medial malleolus, AITF ligament, CF ligament or posterior TF ligament tenderness.      Right Achilles Tendon: No defects. Buckley's test negative.      Left ankle: No tenderness. No lateral malleolus, medial malleolus, AITF ligament, CF ligament or posterior TF ligament tenderness.      Left Achilles Tendon: No defects. Buckley's test negative.      Right foot: No tenderness or bony tenderness.      Left foot: No tenderness or bony tenderness.      Comments: Adequate joint range of motion without pain, limitation, nor crepitation Bilateral feet and ankle joints. Muscle strength is 5/5 in all groups bilaterally.           Feet:       Right foot:      Protective Sensation: 5 sites tested.  5 sites sensed.      Skin integrity: Skin integrity normal.      Left foot:      Protective Sensation: 5 sites tested.  5 sites sensed.      Skin integrity: Skin integrity normal.   Lymphadenopathy:      Comments: No lymphatic streaking     Skin:     General: Skin is warm and dry.      Coloration: Skin is not pale.      Findings: No rash.      Nails: There is no clubbing.      Comments: Skin is of normal turgor.   Normal temperature gradient.  Examination of the skin reveals no evidence of significant rashes, open lesions, suspicious appearing nevi or other concerning lesions.        Toenails 1-5 bilaterally are neatly trimmed; of normal color and thickness   Neurological:      Sensory: No sensory deficit.      Motor: No atrophy.      Comments: Light touch present     Psychiatric:         Attention and Perception: He is attentive.         Mood and Affect: Mood is not anxious. Affect is not inappropriate.         Speech: He is communicative. Speech is not slurred.         Behavior: Behavior is not combative.               Assessment:       Encounter Diagnosis   Name Primary?    Type 2 diabetes mellitus without complication, without long-term current use of insulin Yes         Plan:       Osmel was seen today for diabetic foot exam and foot problem.    Diagnoses and all orders for this visit:    Type 2 diabetes mellitus without complication, without long-term current use of insulin      I counseled the patient on his conditions, their implications and medical management.        - Shoe inspection. Diabetic Foot Education. Patient reminded of the importance of good nutrition and blood sugar control to help prevent podiatric complications of diabetes. Patient instructed on proper foot hygeine. We discussed wearing proper shoe gear, daily foot inspections, never walking without protective shoe gear, caution putting sharp instruments to feet     - Discussed DM foot  care:  Wear comfortable, proper fitting shoes. Wash feet daily. Dry well. After drying, apply moisturizer to feet (no lotion to webspaces). Inspect feet daily for skin breaks, blisters, swelling, or redness. Wear cotton socks (preferably white)  Change socks every day. Do NOT walk barefoot. Do NOT use heating pads or warm/hot water soaks     - Discussed importance of daily moisturizer to the feet such as Cerave,Sween, Or Cetaphil    - Patient is low risk for developing lower extremity issues secondary to diabetes.     - I recommend continued yearly diabetic foot examinations by Myself or PCP    - Currently  patient has NO pedal manifestations of DM    - Patients with out pedal manifestations of DM, do not qualify for Diabetic Shoes/Inserts nor  nail/callus trimming     - RTC in 1 yr or  PRN   .

## 2025-04-19 DIAGNOSIS — E78.01 HYPERLIPIDEMIA TYPE II: ICD-10-CM

## 2025-04-19 DIAGNOSIS — Z00.00 PREVENTATIVE HEALTH CARE: ICD-10-CM

## 2025-04-19 DIAGNOSIS — E11.9 TYPE 2 DIABETES MELLITUS WITHOUT COMPLICATION, WITHOUT LONG-TERM CURRENT USE OF INSULIN: ICD-10-CM

## 2025-04-19 NOTE — TELEPHONE ENCOUNTER
Care Due:                  Date            Visit Type   Department     Provider  --------------------------------------------------------------------------------                                ESTABLISHED                              PATIENT -    [facility] INTERNAL  Kam Terry  Last Visit: 09-      [employer]      [facility]  Next Visit: None Scheduled  None         None Found                                                            Last  Test          Frequency    Reason                     Performed    Due Date  --------------------------------------------------------------------------------    CMP.........  12 months..  empagliflozin, metFORMIN,   10-   10-                             rosuvastatin.............    HBA1C.......  6 months...  empagliflozin, metFORMIN.  11-   05-    [facility] Embedded Care Due Messages. Reference number: [id number].   4/19/2025 5:14:24 AM CDT

## 2025-04-20 NOTE — TELEPHONE ENCOUNTER
Refill Routing Note   Medication(s) are not appropriate for processing by [facility] for the following reason(s):        Required labs outdated    [facility] action(s):  Defer   Requires labs : Yes             Appointments  past 12m or future 3m with PCP    Date Provider   Last Visit   9/10/2024 Kam Phipps MD   Next Visit   Visit date not found Kam Phipps MD   ED visits in past 90 days: 0        Note composed:2:06 PM 04/20/2025

## 2025-04-21 RX ORDER — METFORMIN HYDROCHLORIDE 500 MG/1
1000 TABLET ORAL 2 TIMES DAILY WITH MEALS
Qty: 360 TABLET | Refills: 0 | Status: SHIPPED | OUTPATIENT
Start: 2025-04-21

## 2025-05-01 DIAGNOSIS — E11.9 TYPE 2 DIABETES MELLITUS WITHOUT COMPLICATION, WITHOUT LONG-TERM CURRENT USE OF INSULIN: ICD-10-CM

## 2025-05-01 NOTE — TELEPHONE ENCOUNTER
No care due was identified.  [facility] Embedded Care Due Messages. Reference number: [id number].   5/01/2025 7:50:09 AM CDT

## 2025-05-01 NOTE — TELEPHONE ENCOUNTER
Refill Encounter    PCP Visits: Recent Visits  Date Type Provider Dept   09/10/24 Office Visit Kam Phipps MD [facility] Internal Medicine   05/16/24 Office Visit Kam Phipps MD [facility] Internal Medicine   Showing recent visits within past 360 days and meeting all other requirements  Future Appointments  No visits were found meeting these conditions.  Showing future appointments within next 720 days and meeting all other requirements      Last 3 Blood Pressure:   BP Readings from Last 3 Encounters:   01/24/25 116/80   05/16/24 133/77   02/08/24 126/78     Preferred Pharmacy:   [id number] [place], [place] - [address] [place] [address]  [address] [address]  Phone: [phone] Fax: [phone]    [facility]  [address] [place] [id number]  Phone: [phone] Fax: [phone]    Requested RX:  Requested Prescriptions     Pending Prescriptions Disp Refills    empagliflozin (JARDIANCE) 25 mg tablet 90 tablet 3     Sig: Take 1 tablet (25 mg total) by mouth once daily.      RX Route: Normal

## 2025-05-14 ENCOUNTER — TELEPHONE (OUTPATIENT)
Dept: INTERNAL MEDICINE | Facility: CLINIC | Age: 55
End: 2025-05-14
Payer: COMMERCIAL

## 2025-05-14 NOTE — TELEPHONE ENCOUNTER
Copied from CRM #[id number]. Topic: Appointments - Amb Referral  >> May 14, 2025  2:53 PM Urban wrote:  Type: Patient Call Back    Who called: Patent    What is the request in detail: Please call patient he want to have orders for an A1C lab    Can the clinic reply by [facility]? Yes    Would the patient rather a call back or a response via [facility]? Portal    Best call back number:    Additional Information:

## 2025-05-15 ENCOUNTER — LAB VISIT (OUTPATIENT)
Dept: LAB | Facility: OTHER | Age: 55
End: 2025-05-15
Attending: FAMILY MEDICINE
Payer: COMMERCIAL

## 2025-05-15 DIAGNOSIS — E11.9 TYPE 2 DIABETES MELLITUS WITHOUT COMPLICATION, WITHOUT LONG-TERM CURRENT USE OF INSULIN: ICD-10-CM

## 2025-05-15 LAB
EAG (OHS): 140 MG/DL (ref 68–131)
HBA1C MFR BLD: 6.5 % (ref 4–5.6)

## 2025-05-15 PROCEDURE — 83036 HEMOGLOBIN GLYCOSYLATED A1C: CPT

## 2025-05-15 PROCEDURE — 36415 COLL VENOUS BLD VENIPUNCTURE: CPT

## 2025-05-19 ENCOUNTER — RESULTS FOLLOW-UP (OUTPATIENT)
Dept: INTERNAL MEDICINE | Facility: CLINIC | Age: 55
End: 2025-05-19

## 2025-07-18 DIAGNOSIS — E78.01 HYPERLIPIDEMIA TYPE II: ICD-10-CM

## 2025-07-18 DIAGNOSIS — Z00.00 PREVENTATIVE HEALTH CARE: ICD-10-CM

## 2025-07-18 DIAGNOSIS — E11.9 TYPE 2 DIABETES MELLITUS WITHOUT COMPLICATION, WITHOUT LONG-TERM CURRENT USE OF INSULIN: ICD-10-CM

## 2025-07-18 RX ORDER — METFORMIN HYDROCHLORIDE 500 MG/1
1000 TABLET ORAL 2 TIMES DAILY WITH MEALS
Qty: 360 TABLET | Refills: 0 | Status: SHIPPED | OUTPATIENT
Start: 2025-07-18

## 2025-07-18 NOTE — TELEPHONE ENCOUNTER
Refill Routing Note   Medication(s) are not appropriate for processing by [facility] for the following reason(s):        Required labs outdated    [facility] action(s):  Defer   Requires labs : Yes             Appointments  past 12m or future 3m with PCP    Date Provider   Last Visit   9/10/2024 Kam Phipps MD   Next Visit   Visit date not found Kam Phipps MD   ED visits in past 90 days: 0        Note composed:1:23 PM 07/18/2025

## 2025-07-18 NOTE — TELEPHONE ENCOUNTER
Care Due:                  Date            Visit Type   Department     Provider  --------------------------------------------------------------------------------                                ESTABLISHED                              PATIENT -    [facility] INTERNAL  Aristidestamikonando Harrison  Last Visit: 09-      VIRTUAL      MEDICINE       [facility]  Next Visit: None Scheduled  None         None Found                                                            Last  Test          Frequency    Reason                     Performed    Due Date  --------------------------------------------------------------------------------    CMP.........  12 months..  empagliflozin, metFORMIN,   10-   10-                             rosuvastatin.............    [employer] Embedded Care Due Messages. Reference number: [id number].   7/18/2025 3:09:59 AM CDT

## 2025-07-26 DIAGNOSIS — Z00.00 PREVENTATIVE HEALTH CARE: ICD-10-CM

## 2025-07-26 DIAGNOSIS — E11.9 TYPE 2 DIABETES MELLITUS WITHOUT COMPLICATION, WITHOUT LONG-TERM CURRENT USE OF INSULIN: ICD-10-CM

## 2025-07-26 DIAGNOSIS — E78.01 HYPERLIPIDEMIA TYPE II: ICD-10-CM

## 2025-07-26 NOTE — TELEPHONE ENCOUNTER
Discontinued by Allison 3/5/21 due to pt transfer   No care due was identified.  [facility] Embedded Care Due Messages. Reference number: [id number].   7/26/2025 2:19:11 PM CDT

## 2025-07-28 RX ORDER — METFORMIN HYDROCHLORIDE 500 MG/1
1000 TABLET ORAL 2 TIMES DAILY WITH MEALS
Qty: 360 TABLET | Refills: 0 | Status: SHIPPED | OUTPATIENT
Start: 2025-07-28

## 2025-07-28 NOTE — TELEPHONE ENCOUNTER
Refill Encounter    PCP Visits: Recent Visits  Date Type Provider Dept   09/10/24 Office Visit Kam Phipps MD [facility] Internal Medicine   Showing recent visits within past 360 days and meeting all other requirements  Future Appointments  No visits were found meeting these conditions.  Showing future appointments within next 720 days and meeting all other requirements      Last 3 Blood Pressure:   BP Readings from Last 3 Encounters:   01/24/25 116/80   05/16/24 133/77   02/08/24 126/78     Preferred Pharmacy:   [id number] [address] AT [address] [facility] [address]  [address] [facility] [address] [address]  Phone: [phone] Fax: [phone]    Requested RX:  Requested Prescriptions     Pending Prescriptions Disp Refills    empagliflozin (JARDIANCE) 25 mg tablet 90 tablet 3     Sig: Take 1 tablet (25 mg total) by mouth once daily.    metFORMIN (GLUCOPHAGE) 500 MG tablet 360 tablet 0     Sig: Take 2 tablets (1,000 mg total) by mouth 2 (two) times daily with meals.      RX Route: Normal

## (undated) DEVICE — DRESSING LEUKOPLAST FLEX 1X3IN